# Patient Record
Sex: FEMALE | Race: BLACK OR AFRICAN AMERICAN | Employment: FULL TIME | URBAN - METROPOLITAN AREA
[De-identification: names, ages, dates, MRNs, and addresses within clinical notes are randomized per-mention and may not be internally consistent; named-entity substitution may affect disease eponyms.]

---

## 2017-05-17 ENCOUNTER — HOSPITAL ENCOUNTER (OUTPATIENT)
Dept: OTHER | Age: 52
Discharge: HOME | End: 2017-05-17
Attending: FAMILY MEDICINE | Admitting: FAMILY MEDICINE

## 2017-05-17 DIAGNOSIS — J20.9 ACUTE BRONCHITIS, UNSPECIFIED ORGANISM: ICD-10-CM

## 2017-05-26 ENCOUNTER — HOSPITAL ENCOUNTER (OUTPATIENT)
Dept: NON INVASIVE DIAGNOSTICS | Age: 52
Discharge: HOME | End: 2017-05-26
Attending: FAMILY MEDICINE | Admitting: FAMILY MEDICINE

## 2017-05-26 DIAGNOSIS — I51.7 CARDIOMEGALY: ICD-10-CM

## 2017-05-26 LAB
LV EF: 33 %
LVEF MODALITY: NORMAL

## 2017-06-01 ENCOUNTER — OFFICE VISIT (OUTPATIENT)
Dept: CARDIOLOGY CLINIC | Age: 52
End: 2017-06-01

## 2017-06-01 VITALS
SYSTOLIC BLOOD PRESSURE: 130 MMHG | BODY MASS INDEX: 29.09 KG/M2 | DIASTOLIC BLOOD PRESSURE: 88 MMHG | WEIGHT: 185.75 LBS

## 2017-06-01 DIAGNOSIS — I10 ESSENTIAL HYPERTENSION: ICD-10-CM

## 2017-06-01 DIAGNOSIS — R06.02 SOBOE (SHORTNESS OF BREATH ON EXERTION): ICD-10-CM

## 2017-06-01 DIAGNOSIS — I42.9 CARDIOMYOPATHY (HCC): Primary | ICD-10-CM

## 2017-06-01 DIAGNOSIS — Z78.9 ALCOHOL INGESTION: ICD-10-CM

## 2017-06-01 DIAGNOSIS — Z72.0 TOBACCO ABUSE: ICD-10-CM

## 2017-06-01 PROBLEM — R06.09 DOE (DYSPNEA ON EXERTION): Status: ACTIVE | Noted: 2017-06-01

## 2017-06-01 PROCEDURE — 93000 ELECTROCARDIOGRAM COMPLETE: CPT | Performed by: INTERNAL MEDICINE

## 2017-06-01 PROCEDURE — 99204 OFFICE O/P NEW MOD 45 MIN: CPT | Performed by: INTERNAL MEDICINE

## 2017-06-01 RX ORDER — LOSARTAN POTASSIUM AND HYDROCHLOROTHIAZIDE 12.5; 5 MG/1; MG/1
1 TABLET ORAL DAILY
Qty: 30 TABLET | Refills: 11 | Status: SHIPPED | OUTPATIENT
Start: 2017-06-01 | End: 2017-06-01 | Stop reason: SDUPTHER

## 2017-06-01 RX ORDER — LOSARTAN POTASSIUM AND HYDROCHLOROTHIAZIDE 12.5; 5 MG/1; MG/1
1 TABLET ORAL DAILY
Qty: 30 TABLET | Refills: 11 | Status: SHIPPED | OUTPATIENT
Start: 2017-06-01 | End: 2017-07-11 | Stop reason: SDUPTHER

## 2017-06-01 RX ORDER — CETIRIZINE HYDROCHLORIDE 10 MG/1
10 TABLET ORAL DAILY
COMMUNITY

## 2017-06-01 RX ORDER — CARVEDILOL 3.12 MG/1
3.12 TABLET ORAL 2 TIMES DAILY WITH MEALS
COMMUNITY
End: 2017-08-10 | Stop reason: SDUPTHER

## 2017-06-12 ENCOUNTER — HOSPITAL ENCOUNTER (OUTPATIENT)
Dept: NON INVASIVE DIAGNOSTICS | Age: 52
Discharge: HOME | End: 2017-06-14
Admitting: INTERNAL MEDICINE

## 2017-06-12 DIAGNOSIS — I42.9 CARDIOMYOPATHY (HCC): ICD-10-CM

## 2017-06-12 LAB
LV EF: 38 %
LVEF MODALITY: NORMAL

## 2017-06-14 ENCOUNTER — TELEPHONE (OUTPATIENT)
Dept: CARDIOLOGY CLINIC | Age: 52
End: 2017-06-14

## 2017-06-22 ENCOUNTER — HOSPITAL ENCOUNTER (OUTPATIENT)
Dept: OTHER | Age: 52
Discharge: HOME | End: 2017-06-22
Attending: INTERNAL MEDICINE | Admitting: INTERNAL MEDICINE

## 2017-06-22 DIAGNOSIS — R06.02 SOBOE (SHORTNESS OF BREATH ON EXERTION): ICD-10-CM

## 2017-06-22 DIAGNOSIS — I10 ESSENTIAL HYPERTENSION: ICD-10-CM

## 2017-06-22 DIAGNOSIS — I42.9 CARDIOMYOPATHY (HCC): ICD-10-CM

## 2017-06-22 LAB
ANION GAP SERPL CALCULATED.3IONS-SCNC: 13 MMOL/L (ref 3–16)
BASOPHILS ABSOLUTE: 0.1 K/UL (ref 0–0.2)
BASOPHILS RELATIVE PERCENT: 0.6 %
BUN BLDV-MCNC: 17 MG/DL (ref 7–20)
CALCIUM SERPL-MCNC: 9.7 MG/DL (ref 8.3–10.6)
CHLORIDE BLD-SCNC: 98 MMOL/L (ref 99–110)
CO2: 26 MMOL/L (ref 21–32)
CREAT SERPL-MCNC: 0.8 MG/DL (ref 0.6–1.1)
EOSINOPHILS ABSOLUTE: 0.1 K/UL (ref 0–0.6)
EOSINOPHILS RELATIVE PERCENT: 1 %
GFR AFRICAN AMERICAN: >60
GFR NON-AFRICAN AMERICAN: >60
GLUCOSE BLD-MCNC: 96 MG/DL (ref 70–99)
HCT VFR BLD CALC: 47.7 % (ref 36–48)
HEMOGLOBIN: 15 G/DL (ref 12–16)
LYMPHOCYTES ABSOLUTE: 3.7 K/UL (ref 1–5.1)
LYMPHOCYTES RELATIVE PERCENT: 37.2 %
MCH RBC QN AUTO: 29.5 PG (ref 26–34)
MCHC RBC AUTO-ENTMCNC: 31.4 G/DL (ref 31–36)
MCV RBC AUTO: 94 FL (ref 80–100)
MONOCYTES ABSOLUTE: 0.8 K/UL (ref 0–1.3)
MONOCYTES RELATIVE PERCENT: 8.6 %
NEUTROPHILS ABSOLUTE: 5.2 K/UL (ref 1.7–7.7)
NEUTROPHILS RELATIVE PERCENT: 52.6 %
PDW BLD-RTO: 13.9 % (ref 12.4–15.4)
PLATELET # BLD: 229 K/UL (ref 135–450)
PMV BLD AUTO: 11.6 FL (ref 5–10.5)
POTASSIUM SERPL-SCNC: 4.8 MMOL/L (ref 3.5–5.1)
PRO-BNP: 391 PG/ML (ref 0–124)
RBC # BLD: 5.07 M/UL (ref 4–5.2)
SODIUM BLD-SCNC: 137 MMOL/L (ref 136–145)
WBC # BLD: 9.9 K/UL (ref 4–11)

## 2017-06-27 ENCOUNTER — TELEPHONE (OUTPATIENT)
Dept: CARDIOLOGY CLINIC | Age: 52
End: 2017-06-27

## 2017-07-11 ENCOUNTER — OFFICE VISIT (OUTPATIENT)
Dept: CARDIOLOGY CLINIC | Age: 52
End: 2017-07-11

## 2017-07-11 VITALS
BODY MASS INDEX: 28.56 KG/M2 | DIASTOLIC BLOOD PRESSURE: 80 MMHG | WEIGHT: 182 LBS | OXYGEN SATURATION: 94 % | HEART RATE: 88 BPM | HEIGHT: 67 IN | SYSTOLIC BLOOD PRESSURE: 112 MMHG

## 2017-07-11 DIAGNOSIS — Z78.9 ALCOHOL INGESTION: ICD-10-CM

## 2017-07-11 DIAGNOSIS — I42.9 CARDIOMYOPATHY (HCC): ICD-10-CM

## 2017-07-11 DIAGNOSIS — Z72.0 TOBACCO ABUSE: Primary | ICD-10-CM

## 2017-07-11 DIAGNOSIS — R06.02 SOBOE (SHORTNESS OF BREATH ON EXERTION): ICD-10-CM

## 2017-07-11 DIAGNOSIS — I10 ESSENTIAL HYPERTENSION: ICD-10-CM

## 2017-07-11 PROCEDURE — 99214 OFFICE O/P EST MOD 30 MIN: CPT | Performed by: NURSE PRACTITIONER

## 2017-07-11 RX ORDER — LOSARTAN POTASSIUM AND HYDROCHLOROTHIAZIDE 12.5; 5 MG/1; MG/1
0.5 TABLET ORAL DAILY
Qty: 30 TABLET | Refills: 11 | Status: SHIPPED | OUTPATIENT
Start: 2017-07-11 | End: 2018-06-14 | Stop reason: DRUGHIGH

## 2017-08-10 ENCOUNTER — TELEPHONE (OUTPATIENT)
Dept: CARDIOLOGY CLINIC | Age: 52
End: 2017-08-10

## 2017-08-10 RX ORDER — CARVEDILOL 3.12 MG/1
3.12 TABLET ORAL 2 TIMES DAILY WITH MEALS
Qty: 180 TABLET | Refills: 3 | Status: SHIPPED | OUTPATIENT
Start: 2017-08-10 | End: 2018-06-14 | Stop reason: SDUPTHER

## 2017-10-09 ENCOUNTER — HOSPITAL ENCOUNTER (OUTPATIENT)
Dept: NON INVASIVE DIAGNOSTICS | Age: 52
Discharge: HOME | End: 2017-10-09
Attending: INTERNAL MEDICINE | Admitting: INTERNAL MEDICINE

## 2017-10-09 ENCOUNTER — OFFICE VISIT (OUTPATIENT)
Dept: CARDIOLOGY CLINIC | Age: 52
End: 2017-10-09

## 2017-10-09 VITALS
BODY MASS INDEX: 29.03 KG/M2 | HEART RATE: 82 BPM | HEIGHT: 67 IN | WEIGHT: 185 LBS | DIASTOLIC BLOOD PRESSURE: 70 MMHG | SYSTOLIC BLOOD PRESSURE: 110 MMHG

## 2017-10-09 DIAGNOSIS — I10 ESSENTIAL HYPERTENSION: ICD-10-CM

## 2017-10-09 DIAGNOSIS — R06.02 SOBOE (SHORTNESS OF BREATH ON EXERTION): Primary | ICD-10-CM

## 2017-10-09 DIAGNOSIS — I42.9 CARDIOMYOPATHY, UNSPECIFIED TYPE (HCC): ICD-10-CM

## 2017-10-09 DIAGNOSIS — I42.9 CARDIOMYOPATHY (HCC): ICD-10-CM

## 2017-10-09 DIAGNOSIS — Z78.9 ALCOHOL INGESTION: ICD-10-CM

## 2017-10-09 LAB
LEFT VENTRICULAR EJECTION FRACTION HIGH VALUE: 45 %
LEFT VENTRICULAR EJECTION FRACTION MODE: NORMAL
LV EF: 45 %
LVEF MODALITY: NORMAL

## 2017-10-09 PROCEDURE — 99214 OFFICE O/P EST MOD 30 MIN: CPT | Performed by: INTERNAL MEDICINE

## 2017-10-09 NOTE — PROGRESS NOTES
sores or sore throat. · Cardiovascular: Reviewed in HPI  · Respiratory: No cough or wheezing, no sputum production. No hematemesis. · Gastrointestinal: No abdominal pain, appetite loss, blood in stools. No change in bowel or bladder habits. · Genitourinary: No dysuria, trouble voiding, or hematuria. · Musculoskeletal:  No gait disturbance, weakness or joint complaints. · Integumentary: No rash or pruritis. · Neurological: No headache, diplopia, change in muscle strength, numbness or tingling. No change in gait, balance, coordination, mood, affect, memory, mentation, behavior. · Psychiatric: No anxiety, no depression. · Endocrine: No malaise, fatigue or temperature intolerance. No excessive thirst, fluid intake, or urination. No tremor. · Hematologic/Lymphatic: No abnormal bruising or bleeding, blood clots or swollen lymph nodes. · Allergic/Immunologic: No nasal congestion or hives. Physical Examination:      Constitutional and General Appearance:well developed well nourished female who appears stated age. Respiratory:  · Normal excursion and expansion without use of accessory muscles +SOBOE improving  · Resp Auscultation: Normal breath sounds without dullness  Cardiovascular:  · The apical impulse is not displaced  · Heart tones are crisp and normal  · Cervical veins are not engorged  · The carotid upstroke is normal in amplitude and contour without delay or bruit  · No M/G/R  · There is no clubbing, cyanosis of the extremities.   · No edema  · Femoral Arteries: 2+ and equal  · Pedal Pulses: 2+ and equal   Abdomen:  · No masses or tenderness  · Bowel sounds present  · No organomegaly appreciated  Neurological/Psychiatric:  · Alert and oriented in all spheres  · Moves all extremities well  · Exhibits normal gait balance and coordination  · No abnormalities of mood, affect, memory, mentation, or behavior are noted    2D Echo 10/9/2017   Left ventricular size is normal .   Normal left ventricular wall thickness.   Global ejection fraction is moderately decreased and estimated from 45 % .   Global hypokinesis noted.   Mild mitral regurgitation is present.   The left atrium is at the upper limits of normal in size.   There is mild tricuspid regurgitation with RVSP estimated at 29 mmHg. 2D Echo 5/26/2017   -Global ejection fraction is moderately decreased and estimated from 30 % to   35 % and 37% by 3D full volume.   -Global hypokinesis noted.   -Mildly dilated left atrium.   -Mild aortic regurgitation is present.   -Mild to moderate mitral regurgitation is present.   -There is moderate tricuspid regurgitation with RVSP estimated at 48 mmHg. EKG ST Sinus  Tachycardia interpreted and read by me  -Combined atrial enlargement.    -Nonspecific ST depression  -Nondiagnostic. R 102 bpm    Assessment:     1. Cardiomyopathy (HCCEF 30-35% by echo and 37% by 3D. RVSP 48 mmHg. will get a Stress Myoview to see if she has had a heart attack. 10/9/17 Repeat echo EF improved to 45%. 2. SOBOE (shortness of breath on exertion)--worsening over the past few months. 3. Hypertension--/70 (Site: Right Arm, Position: Sitting, Cuff Size: Large Adult)   Pulse 82   Ht 5' 7\" (1.702 m)   Wt 185 lb (83.9 kg)   BMI 28.98 kg/m²   Good control taking coreg 3.125 mg twice daily and  Hyzaar 50-12.5 mg daily for BP control. 4. Alcohol ingestion--drinks 1+ bottle at a time. Recommend she try and quit or try and cut back to occasional or celebratory use only. .   5. Tobacco abuse -- stop smoking strongly encouraged. Plan:  Continue all medications. This will help with improvement in LV function. Alcohol cessation extremely important and this was emphasized with patient. Follow up in six months. I appreciate the opportunity of cooperating in the care of this individual.    Ata [facility]NG[facility]

## 2018-01-12 ENCOUNTER — HOSPITAL ENCOUNTER (OUTPATIENT)
Dept: OTHER | Age: 53
Discharge: HOME | End: 2018-01-12
Attending: FAMILY MEDICINE | Admitting: FAMILY MEDICINE

## 2018-01-12 DIAGNOSIS — M79.672 LEFT FOOT PAIN: ICD-10-CM

## 2018-01-12 DIAGNOSIS — M25.512 LEFT SHOULDER PAIN, UNSPECIFIED CHRONICITY: ICD-10-CM

## 2018-03-20 ENCOUNTER — TELEPHONE (OUTPATIENT)
Dept: CARDIOLOGY CLINIC | Age: 53
End: 2018-03-20

## 2018-06-14 ENCOUNTER — OFFICE VISIT (OUTPATIENT)
Dept: CARDIOLOGY CLINIC | Age: 53
End: 2018-06-14

## 2018-06-14 VITALS
DIASTOLIC BLOOD PRESSURE: 90 MMHG | HEIGHT: 67 IN | WEIGHT: 188.8 LBS | HEART RATE: 64 BPM | BODY MASS INDEX: 29.63 KG/M2 | SYSTOLIC BLOOD PRESSURE: 148 MMHG

## 2018-06-14 DIAGNOSIS — I10 ESSENTIAL HYPERTENSION: ICD-10-CM

## 2018-06-14 DIAGNOSIS — R06.02 SOBOE (SHORTNESS OF BREATH ON EXERTION): ICD-10-CM

## 2018-06-14 DIAGNOSIS — I42.9 CARDIOMYOPATHY, UNSPECIFIED TYPE (HCC): Primary | ICD-10-CM

## 2018-06-14 PROCEDURE — 99214 OFFICE O/P EST MOD 30 MIN: CPT | Performed by: INTERNAL MEDICINE

## 2018-06-14 RX ORDER — LOSARTAN POTASSIUM AND HYDROCHLOROTHIAZIDE 12.5; 1 MG/1; MG/1
1 TABLET ORAL DAILY
Qty: 90 TABLET | Refills: 3 | Status: SHIPPED | OUTPATIENT
Start: 2018-06-14 | End: 2019-02-25 | Stop reason: CLARIF

## 2018-06-14 RX ORDER — ALLOPURINOL 300 MG/1
300 TABLET ORAL DAILY
COMMUNITY
End: 2018-08-10 | Stop reason: SDUPTHER

## 2018-06-14 RX ORDER — CARVEDILOL 3.12 MG/1
3.12 TABLET ORAL 2 TIMES DAILY WITH MEALS
Qty: 180 TABLET | Refills: 3 | Status: SHIPPED | OUTPATIENT
Start: 2018-06-14 | End: 2018-12-14 | Stop reason: SDUPTHER

## 2018-06-14 RX ORDER — LOSARTAN POTASSIUM AND HYDROCHLOROTHIAZIDE 12.5; 5 MG/1; MG/1
1 TABLET ORAL DAILY
Qty: 90 TABLET | Refills: 3 | Status: CANCELLED | OUTPATIENT
Start: 2018-06-14

## 2018-06-20 ENCOUNTER — TELEPHONE (OUTPATIENT)
Dept: CARDIOLOGY CLINIC | Age: 53
End: 2018-06-20

## 2018-06-21 ENCOUNTER — TELEPHONE (OUTPATIENT)
Dept: CARDIOLOGY CLINIC | Age: 53
End: 2018-06-21

## 2018-07-26 VITALS
RESPIRATION RATE: 16 BRPM | TEMPERATURE: 97.7 F | BODY MASS INDEX: 34.23 KG/M2 | WEIGHT: 186 LBS | HEART RATE: 98 BPM | HEIGHT: 62 IN | DIASTOLIC BLOOD PRESSURE: 76 MMHG | SYSTOLIC BLOOD PRESSURE: 124 MMHG

## 2018-08-10 ENCOUNTER — OFFICE VISIT (OUTPATIENT)
Dept: PRIMARY CARE CLINIC | Age: 53
End: 2018-08-10

## 2018-08-10 VITALS
WEIGHT: 190 LBS | SYSTOLIC BLOOD PRESSURE: 116 MMHG | BODY MASS INDEX: 29.82 KG/M2 | HEART RATE: 80 BPM | RESPIRATION RATE: 16 BRPM | DIASTOLIC BLOOD PRESSURE: 68 MMHG | HEIGHT: 67 IN

## 2018-08-10 DIAGNOSIS — M1A.0720 CHRONIC GOUT OF LEFT FOOT, UNSPECIFIED CAUSE: ICD-10-CM

## 2018-08-10 DIAGNOSIS — H61.23 BILATERAL IMPACTED CERUMEN: Primary | ICD-10-CM

## 2018-08-10 PROCEDURE — 69209 REMOVE IMPACTED EAR WAX UNI: CPT | Performed by: NURSE PRACTITIONER

## 2018-08-10 PROCEDURE — 99213 OFFICE O/P EST LOW 20 MIN: CPT | Performed by: NURSE PRACTITIONER

## 2018-08-10 RX ORDER — ALLOPURINOL 300 MG/1
300 TABLET ORAL DAILY
Qty: 30 TABLET | Refills: 1 | Status: SHIPPED | OUTPATIENT
Start: 2018-08-10 | End: 2019-12-30 | Stop reason: SDUPTHER

## 2018-08-10 ASSESSMENT — ENCOUNTER SYMPTOMS
SORE THROAT: 0
SINUS PRESSURE: 0
SINUS PAIN: 0
TROUBLE SWALLOWING: 0

## 2018-08-10 NOTE — PROGRESS NOTES
01/01/1980    Cervical cancer screen  01/01/1988    Lipid screen  01/01/2007    Breast cancer screen  01/01/2017    Shingles Vaccine (1 of 2 - 2 Dose Series) 01/01/2017    Colon cancer screen colonoscopy  01/01/2017    Potassium monitoring  06/22/2018    Creatinine monitoring  06/22/2018    Flu vaccine (1) 09/01/2018    DTaP/Tdap/Td vaccine (2 - Td) 02/24/2025    Pneumococcal med risk  Completed      Social History     Social History    Marital status:      Spouse name: N/A    Number of children: N/A    Years of education: N/A     Social History Main Topics    Smoking status: Current Every Day Smoker     Packs/day: 0.20    Smokeless tobacco: Never Used    Alcohol use Yes      Comment: OCC    Drug use: No    Sexual activity: Not Asked     Other Topics Concern    None     Social History Narrative    None     Family History   Problem Relation Age of Onset    Diabetes Other     High Blood Pressure Other     Other Other         CVA    Heart Disease Neg Hx      Patient Active Problem List   Diagnosis    Cardiomyopathy ([address]    DELUNA (dyspnea on exertion)    Tobacco abuse    Alcohol ingestion    Chronic gout of left foot        LABS:  Hospital Outpatient Visit on 10/09/2017   Component Date Value Ref Range Status    LEFT VENTRICULAR EJECTION FRACTION* 10/09/2017 Echo   Final    Left Ventricular Ejection Fraction* 10/09/2017 45  % Final          PHYSICAL EXAM  /68 (Site: Right Arm, Position: Sitting)   Pulse 80   Resp 16   Ht 5' 7\" (1.702 m)   Wt 190 lb (86.2 kg)   BMI 29.76 kg/m²     BP Readings from Last 3 Encounters:   08/10/18 116/68   03/12/18 124/76   06/14/18 (!) 148/90       Wt Readings from Last 3 Encounters:   08/10/18 190 lb (86.2 kg)   03/12/18 186 lb (84.4 kg)   06/14/18 188 lb 12.8 oz (85.6 kg)        Physical Exam   Constitutional: She is oriented to person, place, and time. She appears well-developed and well-nourished.    HENT:   Head: Normocephalic and atraumatic. Right Ear: Tympanic membrane normal.   Left Ear: Tympanic membrane, external ear and ear canal normal.   bilt cerumen impaction and after irrigated and small amt of right canal irritation   Eyes: Conjunctivae and EOM are normal. Pupils are equal, round, and reactive to light. Neck: Normal range of motion. Neck supple. Cardiovascular: Normal rate, regular rhythm and normal heart sounds. Pulmonary/Chest: Effort normal and breath sounds normal.   Musculoskeletal: Normal range of motion. Lymphadenopathy:     She has no cervical adenopathy. Neurological: She is alert and oriented to person, place, and time. Skin: Skin is warm and dry. Psychiatric: She has a normal mood and affect. Her behavior is normal.   Nursing note and vitals reviewed. ASSESSMENT/PLAN:  Alhaji San was seen today for cerumen impaction and Gout    Diagnoses and all orders for this visit:    Bilateral impacted cerumen  -     NE REMOVAL IMPACTED CERUMEN IRRIGATION/LVG UNILAT- Performed bilaterally  Discussed using Debrox 1 drop to each ear once a month to prevent impaction  Limit use of ear buds/phones and Qtips    Chronic gout of left foot, unspecified cause  -     allopurinol (ZYLOPRIM) 300 MG tablet; Take 1 tablet by mouth daily  -     Uric Acid; Future  Reinforced low purine foods        Return in about 3 months (around 11/10/2018) for GOUT.   Reviewed and/or ordered clinical lab results Yes  Reviewed and/or ordered radiology tests No  Reviewed and/or ordered other diagnostic tests No  Discussed test results with performing physician No  Independently reviewed image, tracing, or specimen No  Made a decision to obtain old records No  Reviewed and summarized old records Yes      Jesu Degroot was counseled regarding symptoms of current diagnosis, course and complications of disease if inadequately treated, side effects of medications, diagnosis, treatment options, and prognosis, risks, benefits, complications, and

## 2018-12-14 ENCOUNTER — HOSPITAL ENCOUNTER (OUTPATIENT)
Dept: NON INVASIVE DIAGNOSTICS | Age: 53
Discharge: HOME | End: 2018-12-14
Payer: COMMERCIAL

## 2018-12-14 ENCOUNTER — OFFICE VISIT (OUTPATIENT)
Dept: CARDIOLOGY CLINIC | Age: 53
End: 2018-12-14
Payer: COMMERCIAL

## 2018-12-14 VITALS
DIASTOLIC BLOOD PRESSURE: 80 MMHG | WEIGHT: 187.9 LBS | HEART RATE: 81 BPM | SYSTOLIC BLOOD PRESSURE: 118 MMHG | HEIGHT: 67 IN | BODY MASS INDEX: 29.49 KG/M2

## 2018-12-14 DIAGNOSIS — I42.9 CARDIOMYOPATHY, UNSPECIFIED TYPE (HCC): ICD-10-CM

## 2018-12-14 DIAGNOSIS — Z72.0 TOBACCO ABUSE: ICD-10-CM

## 2018-12-14 DIAGNOSIS — R06.02 SOBOE (SHORTNESS OF BREATH ON EXERTION): ICD-10-CM

## 2018-12-14 DIAGNOSIS — R06.09 DOE (DYSPNEA ON EXERTION): ICD-10-CM

## 2018-12-14 DIAGNOSIS — I10 ESSENTIAL HYPERTENSION: ICD-10-CM

## 2018-12-14 DIAGNOSIS — R00.2 PALPITATIONS: Primary | ICD-10-CM

## 2018-12-14 DIAGNOSIS — R07.9 CHEST PAIN IN ADULT: ICD-10-CM

## 2018-12-14 LAB
LEFT VENTRICULAR EJECTION FRACTION HIGH VALUE: 50 %
LEFT VENTRICULAR EJECTION FRACTION MODE: NORMAL
LV EF: 45 %
LV EF: 48 %
LVEF MODALITY: NORMAL

## 2018-12-14 PROCEDURE — 99214 OFFICE O/P EST MOD 30 MIN: CPT | Performed by: INTERNAL MEDICINE

## 2018-12-14 PROCEDURE — 93000 ELECTROCARDIOGRAM COMPLETE: CPT | Performed by: INTERNAL MEDICINE

## 2018-12-14 PROCEDURE — 93306 TTE W/DOPPLER COMPLETE: CPT

## 2018-12-14 RX ORDER — CARVEDILOL 3.12 MG/1
3.12 TABLET ORAL 2 TIMES DAILY WITH MEALS
Qty: 180 TABLET | Refills: 3 | Status: CANCELLED | OUTPATIENT
Start: 2018-12-14

## 2018-12-14 RX ORDER — CARVEDILOL 6.25 MG/1
6.25 TABLET ORAL 2 TIMES DAILY WITH MEALS
Qty: 180 TABLET | Refills: 3 | Status: SHIPPED | OUTPATIENT
Start: 2018-12-14 | End: 2019-06-25 | Stop reason: ALTCHOICE

## 2018-12-14 RX ORDER — LOSARTAN POTASSIUM AND HYDROCHLOROTHIAZIDE 25; 100 MG/1; MG/1
1 TABLET ORAL DAILY
COMMUNITY
End: 2019-07-26 | Stop reason: SDUPTHER

## 2018-12-14 RX ORDER — LOSARTAN POTASSIUM AND HYDROCHLOROTHIAZIDE 25; 100 MG/1; MG/1
1 TABLET ORAL DAILY
Qty: 90 TABLET | Refills: 3 | Status: CANCELLED | OUTPATIENT
Start: 2018-12-14

## 2018-12-14 NOTE — PROGRESS NOTES
is moderate tricuspid regurgitation with RVSP estimated at 48 mmHg. EKG 6/1/17> ST Sinus Tachycardia   -Combined atrial enlargement.    -Nonspecific ST depression  -Nondiagnostic. R 102 bpm    Assessment:     1. Cardiomyopathy ([address]): Stable, compensated. Increase Coreg to 6.25mg bid to see if this helps her palpitations. EKG today 12/14/18> NSR 82  ECHO 5/26/17> EF 30-35% and 37% by 3D. RVSP 48 mmHg. ECHO 10/9/17> EF improved to 45%. ECHO today 12/14/18> EF 45-50%. 2. SOB (shortness of breath on exertion), chronic: Stable, improved. 3. Hypertension: Stable. Blood pressure 118/80, pulse 81, height 5' 7\" (1.702 m), weight 187 lb 14.4 oz (85.2 kg). Increased Hyzaar to 100-12.5mg qd at last visit. 4. Alcohol ingestion: She currently does not quantify. 5. Tobacco dependence: Smoking cessation encouraged. Weaning off. Plan: Lyle Zee has a stable cardiac status but will monitor her heart rhythm and rate. 1. Holter monitor 24hr (schedule for Monday). 2. Double Coreg. Recommend Aleve for her chest and shoulder soreness. 3. Will continue with risk factor modifications. 4. Return for regular follow up in 6 months. I appreciate the opportunity of cooperating in the care of this individual.    [facility]g [facility]    Scribe's attestation: This note was scribed in the presence of Dr. Sreekanth Verma MD, by Bushra Betts RN. \  The scribe's documentation has been prepared under my direction and personally reviewed by me in its entirety. I confirm that the note above accurately reflects all work, treatment, procedures, and medical decision making performed by me.

## 2018-12-17 ENCOUNTER — NURSE ONLY (OUTPATIENT)
Dept: CARDIOLOGY CLINIC | Age: 53
End: 2018-12-17
Payer: COMMERCIAL

## 2018-12-17 DIAGNOSIS — R00.2 PALPITATIONS: Primary | ICD-10-CM

## 2018-12-17 PROCEDURE — 93224 XTRNL ECG REC UP TO 48 HRS: CPT | Performed by: INTERNAL MEDICINE

## 2018-12-28 ENCOUNTER — TELEPHONE (OUTPATIENT)
Dept: CARDIOLOGY CLINIC | Age: 53
End: 2018-12-28

## 2019-02-25 ENCOUNTER — OFFICE VISIT (OUTPATIENT)
Dept: PRIMARY CARE CLINIC | Age: 54
End: 2019-02-25
Payer: COMMERCIAL

## 2019-02-25 VITALS
HEART RATE: 88 BPM | RESPIRATION RATE: 16 BRPM | TEMPERATURE: 98.8 F | DIASTOLIC BLOOD PRESSURE: 70 MMHG | WEIGHT: 190 LBS | BODY MASS INDEX: 29.82 KG/M2 | HEIGHT: 67 IN | SYSTOLIC BLOOD PRESSURE: 106 MMHG

## 2019-02-25 DIAGNOSIS — E04.9 GOITER: ICD-10-CM

## 2019-02-25 DIAGNOSIS — D17.22 LIPOMA OF LEFT SHOULDER: ICD-10-CM

## 2019-02-25 DIAGNOSIS — B34.9 VIRAL SYNDROME: Primary | ICD-10-CM

## 2019-02-25 LAB
INFLUENZA A ANTIBODY: NORMAL
INFLUENZA B ANTIBODY: NORMAL

## 2019-02-25 PROCEDURE — 87804 INFLUENZA ASSAY W/OPTIC: CPT | Performed by: FAMILY MEDICINE

## 2019-02-25 PROCEDURE — 99213 OFFICE O/P EST LOW 20 MIN: CPT | Performed by: FAMILY MEDICINE

## 2019-02-25 RX ORDER — AMOXICILLIN 500 MG/1
500 CAPSULE ORAL 3 TIMES DAILY
Qty: 30 CAPSULE | Refills: 0 | Status: SHIPPED | OUTPATIENT
Start: 2019-02-25 | End: 2019-03-07

## 2019-02-25 ASSESSMENT — ENCOUNTER SYMPTOMS
PHOTOPHOBIA: 0
EYE ITCHING: 0
EYES NEGATIVE: 1
VOMITING: 0
SORE THROAT: 0
TROUBLE SWALLOWING: 0
CONSTIPATION: 0
ABDOMINAL PAIN: 0
RESPIRATORY NEGATIVE: 1
BACK PAIN: 0
COUGH: 0
ABDOMINAL DISTENTION: 0
GASTROINTESTINAL NEGATIVE: 1
DIARRHEA: 0
NAUSEA: 0
EYE DISCHARGE: 0
WHEEZING: 0
COLOR CHANGE: 0
EYE PAIN: 0
SHORTNESS OF BREATH: 0
CHEST TIGHTNESS: 0
SINUS PRESSURE: 0
APNEA: 0

## 2019-02-26 LAB
T4 FREE: 1.1 NG/DL (ref 0.9–1.8)
TSH SERPL DL<=0.05 MIU/L-ACNC: 0.88 UIU/ML (ref 0.27–4.2)

## 2019-03-11 ENCOUNTER — TELEPHONE (OUTPATIENT)
Dept: PRIMARY CARE CLINIC | Age: 54
End: 2019-03-11

## 2019-03-11 RX ORDER — FLUCONAZOLE 150 MG/1
150 TABLET ORAL ONCE
Qty: 1 TABLET | Refills: 0 | Status: SHIPPED | OUTPATIENT
Start: 2019-03-11 | End: 2019-03-11

## 2019-06-03 ENCOUNTER — TELEPHONE (OUTPATIENT)
Dept: CARDIOLOGY CLINIC | Age: 54
End: 2019-06-03

## 2019-06-03 NOTE — TELEPHONE ENCOUNTER
Pt is faxing [place] paperwork today to our office, she needs these completed and faxed back to the number on the paperwork prior to her appt with LES on 6-25. Pls call to advise. Thank you.

## 2019-06-07 ENCOUNTER — TELEPHONE (OUTPATIENT)
Dept: CARDIOLOGY CLINIC | Age: 54
End: 2019-06-07

## 2019-06-25 ENCOUNTER — OFFICE VISIT (OUTPATIENT)
Dept: CARDIOLOGY CLINIC | Age: 54
End: 2019-06-25
Payer: COMMERCIAL

## 2019-06-25 VITALS
SYSTOLIC BLOOD PRESSURE: 108 MMHG | HEART RATE: 91 BPM | DIASTOLIC BLOOD PRESSURE: 72 MMHG | BODY MASS INDEX: 30.89 KG/M2 | WEIGHT: 196.8 LBS | HEIGHT: 67 IN | OXYGEN SATURATION: 98 %

## 2019-06-25 DIAGNOSIS — R06.09 DOE (DYSPNEA ON EXERTION): ICD-10-CM

## 2019-06-25 DIAGNOSIS — Z72.0 TOBACCO ABUSE: ICD-10-CM

## 2019-06-25 DIAGNOSIS — I10 ESSENTIAL HYPERTENSION: ICD-10-CM

## 2019-06-25 DIAGNOSIS — I42.9 CARDIOMYOPATHY, UNSPECIFIED TYPE (HCC): Primary | ICD-10-CM

## 2019-06-25 PROCEDURE — 99214 OFFICE O/P EST MOD 30 MIN: CPT | Performed by: INTERNAL MEDICINE

## 2019-06-25 RX ORDER — METOPROLOL SUCCINATE 25 MG/1
25 TABLET, EXTENDED RELEASE ORAL DAILY
Qty: 90 TABLET | Refills: 2 | Status: SHIPPED | OUTPATIENT
Start: 2019-06-25 | End: 2020-01-15 | Stop reason: SDUPTHER

## 2019-06-25 NOTE — PROGRESS NOTES
Cardiac Follow up     Referring Provider:  Samson Severin, MD     Chief Complaint   Patient presents with    Cardiomyopathy    Palpitations      History of Present Illness: This 48 y.o. female. Her history includes cardiomyopathy. Initially, she was having worsening sob and cough over a few months. PMH includes smoking but is trying to quit. She has no other heart history. She had an echo 2017 and her EF was reduced to 30-35%, and 37% by 3D, then 35-45% by repeat 2D. She has a  history of hypertension was  tried on lisinopril; it caused a cough and was stopped. Today,she reports she feels well. Denies exertional chest pain, palpitations, light-headedness, edema. She walks two miles a day, and her shortness of breath has improved. She states she is still drinking, though not as much,  unfortunately she continues to smoke. States that while she is at work she does not smoke. Reports that she does not always take the second dose of her Coreg as she feels it makes her drowsy while she is working. Past Medical History:   has a past medical history of Allergic rhinitis, Cardiomyopathy ([address] Depression, Gout, and Hypertension. Surgical History:   has a past surgical history that includes Hysterectomy; cyst removal; Appendectomy; and partial hysterectomy (cervix not removed). Social History:   reports that she has been smoking. She has been smoking about 0.20 packs per day. She has never used smokeless tobacco. She reports that she drinks alcohol. She reports that she does not use drugs. Family History:  family history includes Diabetes in an other family member; High Blood Pressure in an other family member; Other in an other family member.      Home Medications:  Outpatient Medications Marked as Taking for the 6/25/19 encounter (Office Visit) with Lachelle Coleman MD   Medication Sig Dispense Refill    metoprolol succinate (TOPROL XL) 25 MG extended release tablet Take 1 tablet by mouth daily 90 tablet 2    losartan-hydrochlorothiazide (HYZAAR) 100-25 MG per tablet Take 1 tablet by mouth daily      carvedilol (COREG) 6.25 MG tablet Take 1 tablet by mouth 2 times daily (with meals) 180 tablet 3    allopurinol (ZYLOPRIM) 300 MG tablet Take 1 tablet by mouth daily 30 tablet 1    cetirizine (ZYRTEC) 10 MG tablet Take 10 mg by mouth daily      fluticasone (VERAMYST) 27.5 MCG/SPRAY nasal spray 2 sprays by Nasal route daily       Allergies:  Codeine and Lisinopril     Review of Systems:   · Constitutional: there has been no unanticipated weight loss. There's been no change in energy level, sleep pattern, or activity level. · Eyes: No visual changes or diplopia. No scleral icterus. · ENT: No Headaches, hearing loss or vertigo. No mouth sores or sore throat. · Cardiovascular: Reviewed in HPI  · Respiratory: No cough or wheezing, no sputum production. No hematemesis. · Gastrointestinal: No abdominal pain, appetite loss, blood in stools. No change in bowel or bladder habits. · Genitourinary: No dysuria, trouble voiding, or hematuria. · Musculoskeletal:  No gait disturbance, weakness or joint complaints. · Integumentary: No rash or pruritis. · Neurological: No headache, diplopia, change in muscle strength, numbness or tingling. No change in gait, balance, coordination, mood, affect, memory, mentation, behavior. · Psychiatric: No anxiety, no depression. · Endocrine: No malaise, fatigue or temperature intolerance. No excessive thirst, fluid intake, or urination. No tremor. · Hematologic/Lymphatic: No abnormal bruising or bleeding, blood clots or swollen lymph nodes. · Allergic/Immunologic: No nasal congestion or hives. Physical Examination:    Blood pressure 108/72, pulse 91, height 5' 7\" (1.702 m), weight 196 lb 12.8 oz (89.3 kg), SpO2 98 %, not currently breastfeeding. Constitutional and General Appearance:well developed well nourished female who appears stated age.   Skin:good turgor,intact without lesions  HEENT: EOMI ,normal  Neck:no JVD      Respiratory:  · Normal excursion and expansion without use of accessory muscles +SOBOE improving  · Resp Auscultation: Normal breath sounds without dullness  Cardiovascular:  · The apical impulse is not displaced  · Heart tones are crisp and normal  · Cervical veins are not engorged  · The carotid upstroke is normal in amplitude and contour without delay or bruit  · No M/G/R  · There is no clubbing, cyanosis of the extremities. · No edema  · Femoral Arteries: 2+ and equal  · Pedal Pulses: 2+ and equal   Abdomen:  · No masses or tenderness  · Bowel sounds present  · No organomegaly appreciated  Neurological/Psychiatric:  · Alert and oriented in all spheres  · Moves all extremities well  · Exhibits normal gait balance and coordination  · No abnormalities of mood, affect, memory, mentation, or behavior are noted    2D ECHO 12/14/18:  -Left ventricular size is at upper limits of normal .   -Normal left ventricular wall thickness.   -Global ejection fraction is mildly decreased and estimated from 45-50% .  -Global hypokinesis noted. E/e'= 7.85   -There is reversal of E/A inflow velocities across the mitral valve.   -Mild mitral regurgitation is present.   -There is mild tricuspid regurgitation with RVSP estimated at 24 mmHg. Assessment:     1. Cardiomyopathy ([address]): Stable, compensated. ECHO 5/26/17> EF 30-35% and 37% by 3D. RVSP 48 mmHg. ECHO 10/9/17> EF improved to 45%. ECHO  12/14/18> EF 45-50%. -repeat echo in 6 months to evaluate for changes    2. SOB (shortness of breath on exertion), chronic: Stable, improved. 3. Hypertension: Stable. Blood pressure 108/72, pulse 91, height 5' 7\" (1.702 m), weight 196 lb 12.8 oz (89.3 kg), SpO2 98 %, not currently breastfeeding. 4. Tobacco dependence: Smoking cessation encouraged. Plan: Elvira David has a stable cardiac status. Lab results reviewed this visit and discussed. Stop Coreg, feels that she is to drowsy with the Pm dose. Start Toprol XL 25 mg daily   Continue risk factor modifications. Call for any change in symptoms. Return for regular follow up in 6 months with Echo. I appreciate the opportunity of cooperating in the care of this individual.    Carolyne [facility]    Patient's problem list, medications, allergies, past medical, surgical, social and family histories were reviewed and updated as appropriate. Scribe's attestation: This note was scribed in the presence of Dr. Isacc Perera MD, by Geo Ceja RN. The scribe's documentation has been prepared under my direction and personally reviewed by me in its entirety. I confirm that the note above accurately reflects all work, treatment, procedures, and medical decision making performed by me.

## 2019-07-26 DIAGNOSIS — I10 ESSENTIAL HYPERTENSION: Primary | ICD-10-CM

## 2019-07-29 RX ORDER — LOSARTAN POTASSIUM AND HYDROCHLOROTHIAZIDE 25; 100 MG/1; MG/1
TABLET ORAL
Qty: 90 TABLET | Refills: 0 | Status: SHIPPED | OUTPATIENT
Start: 2019-07-29 | End: 2020-05-13

## 2019-12-27 DIAGNOSIS — M1A.0720 CHRONIC GOUT OF LEFT FOOT, UNSPECIFIED CAUSE: ICD-10-CM

## 2019-12-30 RX ORDER — ALLOPURINOL 300 MG/1
300 TABLET ORAL DAILY
Qty: 30 TABLET | Refills: 1 | Status: SHIPPED | OUTPATIENT
Start: 2019-12-30 | End: 2021-01-18 | Stop reason: ALTCHOICE

## 2020-01-09 NOTE — PROGRESS NOTES
bruit  · No M/G/R  · There is no clubbing, cyanosis of the extremities. · No edema  · Femoral Arteries: 2+ and equal  · Pedal Pulses: 2+ and equal   Abdomen:  · No masses or tenderness  · Bowel sounds present  · No organomegaly appreciated  Neurological/Psychiatric:  · Alert and oriented in all spheres  · Moves all extremities well  · Exhibits normal gait balance and coordination  · No abnormalities of mood, affect, memory, mentation, or behavior are noted    2D ECHO 12/14/18:  -Left ventricular size is at upper limits of normal .   -Normal left ventricular wall thickness.   -Global ejection fraction is mildly decreased and estimated from 45-50% .  -Global hypokinesis noted. E/e'= 7.85   -There is reversal of E/A inflow velocities across the mitral valve.   -Mild mitral regurgitation is present.   -There is mild tricuspid regurgitation with RVSP estimated at 24 mmHg. Assessment:     1. Cardiomyopathy ([address]): Stable, compensated. ECHO 5/26/17> EF 30-35% and 37% by 3D. RVSP 48 mmHg. ECHO 10/9/17> EF improved to 45%. ECHO  12/14/18> EF 45-50%. ECHO 1/15/20> will be scheduled   2. SOB (shortness of breath on exertion), chronic: Stable, improved. 3. Hypertension: Stable. Blood pressure 124/80, pulse 80, height 5' 7\" (1.702 m), weight 193 lb 1.9 oz (87.6 kg), not currently breastfeeding. 4. Tobacco dependence: Smoking cessation encouraged. Plan: Chivo Dunham has a stable cardiac status. Cardiac test and lab results personally reviewed by me during this office visit and discussed. Increase metoprolol succinate (TOPROL XL) 50 MG daily. Schedule echo in 2-3 months. Continue risk factor modifications. Call for any change in symptoms. Return for regular follow up in 6 months. Once again advised her that alcohol cessation gives best chance of full recovery of cardiomyopathy        I appreciate the opportunity of cooperating in the care of this individual.    Dillon Du.

## 2020-01-15 ENCOUNTER — OFFICE VISIT (OUTPATIENT)
Dept: CARDIOLOGY CLINIC | Age: 55
End: 2020-01-15
Payer: COMMERCIAL

## 2020-01-15 VITALS
WEIGHT: 193.12 LBS | BODY MASS INDEX: 30.31 KG/M2 | HEIGHT: 67 IN | DIASTOLIC BLOOD PRESSURE: 80 MMHG | SYSTOLIC BLOOD PRESSURE: 124 MMHG | HEART RATE: 80 BPM

## 2020-01-15 PROCEDURE — 99214 OFFICE O/P EST MOD 30 MIN: CPT | Performed by: INTERNAL MEDICINE

## 2020-01-15 RX ORDER — METOPROLOL SUCCINATE 50 MG/1
50 TABLET, EXTENDED RELEASE ORAL DAILY
Qty: 90 TABLET | Refills: 1 | Status: SHIPPED | OUTPATIENT
Start: 2020-01-15 | End: 2020-07-08 | Stop reason: ALTCHOICE

## 2020-03-06 ENCOUNTER — TELEPHONE (OUTPATIENT)
Dept: CARDIOLOGY CLINIC | Age: 55
End: 2020-03-06

## 2020-03-06 ENCOUNTER — HOSPITAL ENCOUNTER (OUTPATIENT)
Dept: NON INVASIVE DIAGNOSTICS | Age: 55
Discharge: HOME | End: 2020-03-06
Payer: COMMERCIAL

## 2020-03-06 LAB
LV EF: 55 %
LVEF MODALITY: NORMAL

## 2020-03-06 PROCEDURE — 93306 TTE W/DOPPLER COMPLETE: CPT

## 2020-03-16 ENCOUNTER — TELEPHONE (OUTPATIENT)
Dept: CARDIOLOGY CLINIC | Age: 55
End: 2020-03-16

## 2020-05-13 ENCOUNTER — TELEPHONE (OUTPATIENT)
Dept: CARDIOLOGY CLINIC | Age: 55
End: 2020-05-13

## 2020-05-13 RX ORDER — LOSARTAN POTASSIUM AND HYDROCHLOROTHIAZIDE 25; 100 MG/1; MG/1
TABLET ORAL
Qty: 90 TABLET | Refills: 1 | Status: SHIPPED | OUTPATIENT
Start: 2020-05-13 | End: 2020-05-19 | Stop reason: SDUPTHER

## 2020-05-19 ENCOUNTER — TELEPHONE (OUTPATIENT)
Dept: CARDIOLOGY CLINIC | Age: 55
End: 2020-05-19

## 2020-05-19 RX ORDER — LOSARTAN POTASSIUM AND HYDROCHLOROTHIAZIDE 25; 100 MG/1; MG/1
1 TABLET ORAL DAILY
Qty: 90 TABLET | Refills: 1 | Status: SHIPPED | OUTPATIENT
Start: 2020-05-19 | End: 2021-01-18 | Stop reason: SDUPTHER

## 2020-07-08 ENCOUNTER — OFFICE VISIT (OUTPATIENT)
Dept: CARDIOLOGY CLINIC | Age: 55
End: 2020-07-08
Payer: COMMERCIAL

## 2020-07-08 VITALS
WEIGHT: 198.12 LBS | HEIGHT: 67 IN | HEART RATE: 64 BPM | BODY MASS INDEX: 31.1 KG/M2 | SYSTOLIC BLOOD PRESSURE: 110 MMHG | DIASTOLIC BLOOD PRESSURE: 64 MMHG

## 2020-07-08 PROCEDURE — 99214 OFFICE O/P EST MOD 30 MIN: CPT | Performed by: INTERNAL MEDICINE

## 2020-07-08 NOTE — PATIENT INSTRUCTIONS
Metoprolol succinate (TOPROL XL) 50 MG extended release tablet -Take every other-day for 2 weeks - then stop. Guille Ibanez MD     Address:  [place], [address]     Phone: [phone]

## 2020-07-08 NOTE — PROGRESS NOTES
Allergies:  Codeine and Lisinopril     Review of Systems:   · Constitutional: there has been no unanticipated weight loss. There's been no change in energy level, sleep pattern, or activity level. · Eyes: No visual changes or diplopia. No scleral icterus. · ENT: No Headaches, hearing loss or vertigo. No mouth sores or sore throat. · Cardiovascular: Reviewed in HPI  · Respiratory: No cough or wheezing, no sputum production. No hematemesis. · Gastrointestinal: No abdominal pain, appetite loss, blood in stools. No change in bowel or bladder habits. · Genitourinary: No dysuria, trouble voiding, or hematuria. · Musculoskeletal:  No gait disturbance, weakness or joint complaints. · Integumentary: No rash or pruritis. · Neurological: No headache, diplopia, change in muscle strength, numbness or tingling. No change in gait, balance, coordination, mood, affect, memory, mentation, behavior. · Psychiatric: No anxiety, no depression. · Endocrine: No malaise, fatigue or temperature intolerance. No excessive thirst, fluid intake, or urination. No tremor. · Hematologic/Lymphatic: No abnormal bruising or bleeding, blood clots or swollen lymph nodes. · Allergic/Immunologic: No nasal congestion or hives. Physical Examination:    Blood pressure 110/64, pulse 64, height 5' 7\" (1.702 m), weight 198 lb 1.9 oz (89.9 kg), not currently breastfeeding. Constitutional and General Appearance:well developed well nourished female who appears stated age.   Skin:good turgor,intact without lesions  HEENT: EOMI ,normal  Neck:no JVD      Respiratory:  · Normal excursion and expansion without use of accessory muscles +SOBOE improving  · Resp Auscultation: Normal breath sounds without dullness  Cardiovascular:  · The apical impulse is not displaced  · Heart tones are crisp and normal  · Cervical veins are not engorged  · The carotid upstroke is normal in amplitude and contour without delay or bruit  · No M/G/R  · There is no clubbing, cyanosis of the extremities. · No edema  · Femoral Arteries: 2+ and equal  · Pedal Pulses: 2+ and equal   Abdomen:  · No masses or tenderness  · Bowel sounds present  · No organomegaly appreciated  Neurological/Psychiatric:  · Alert and oriented in all spheres  · Moves all extremities well  · Exhibits normal gait balance and coordination  · No abnormalities of mood, affect, memory, mentation, or behavior are noted    2D ECHO 12/14/18:  -Left ventricular size is at upper limits of normal .   -Normal left ventricular wall thickness.   -Global ejection fraction is mildly decreased and estimated from 45-50% .  -Global hypokinesis noted. E/e'= 7.85   -There is reversal of E/A inflow velocities across the mitral valve.   -Mild mitral regurgitation is present.   -There is mild tricuspid regurgitation with RVSP estimated at 24 mmHg. Assessment:     1. Cardiomyopathy ([address]): Stable, compensated. ECHO 5/26/17> EF 30-35% and 37% by 3D. RVSP 48 mmHg. ECHO 10/9/17> EF improved to 45%. ECHO  12/14/18> EF 45-50%. ECHO 3/06/20> EF 55%      2. SOB (shortness of breath on exertion), chronic: Stable, improved. 3. Hypertension: Stable. Blood pressure 110/64, pulse 64, height 5' 7\" (1.702 m), weight 198 lb 1.9 oz (89.9 kg), not currently breastfeeding. 4. Tobacco dependence: Smoking cessation encouraged. Plan: Marquis East has a stable cardiac status. Cardiac test and lab results personally reviewed by me during this office visit and discussed. Metoprolol succinate (TOPROL XL) 50 MG extended release tablet -Take every other-day for 2 weeks - then stop. Continue risk factor modifications. Call for any change in symptoms. Return for regular follow up in 6 months. I appreciate the opportunity of cooperating in the care of this individual.    Daija [facility]    Patient's problem list, medications, allergies, past medical, surgical, social and family histories were reviewed and updated as appropriate. Scribe's attestation: This note was scribed in the presence of Dr. aKlyani Walker MD, by Dona Cerda RN. The scribe's documentation has been prepared under my direction and personally reviewed by me in its entirety. I confirm that the note above accurately reflects all work, treatment, procedures, and medical decision making performed by me.

## 2020-07-09 ENCOUNTER — TELEPHONE (OUTPATIENT)
Dept: INTERNAL MEDICINE CLINIC | Age: 55
End: 2020-07-09

## 2020-07-09 NOTE — TELEPHONE ENCOUNTER
The following patient is requesting a new patient appointment    With Provider: Dr Peg Mercer (referred by cardiologist)    Insurance: [employer]    Medications / Conditions / Complaint :  Looking to establish a PCP, High Blood pressure, Seeing a cardiologist    Preferred Days: na    Preferred Time: na    Best Contact Number: [phone]

## 2020-07-10 NOTE — TELEPHONE ENCOUNTER
Dr. Blanca Crane doesn't accept transfer/new to provider patients. She has last seen Dr. Kenyetta Torres last year.

## 2020-07-30 ENCOUNTER — OFFICE VISIT (OUTPATIENT)
Dept: INTERNAL MEDICINE CLINIC | Age: 55
End: 2020-07-30
Payer: COMMERCIAL

## 2020-07-30 VITALS — DIASTOLIC BLOOD PRESSURE: 80 MMHG | BODY MASS INDEX: 30.23 KG/M2 | WEIGHT: 193 LBS | SYSTOLIC BLOOD PRESSURE: 118 MMHG

## 2020-07-30 PROBLEM — F41.3 OTHER MIXED ANXIETY DISORDERS: Status: ACTIVE | Noted: 2020-07-30

## 2020-07-30 LAB — VITAMIN D 25-HYDROXY: 10.2 NG/ML

## 2020-07-30 PROCEDURE — 99203 OFFICE O/P NEW LOW 30 MIN: CPT | Performed by: NURSE PRACTITIONER

## 2020-07-30 SDOH — HEALTH STABILITY: PHYSICAL HEALTH: ON AVERAGE, HOW MANY MINUTES DO YOU ENGAGE IN EXERCISE AT THIS LEVEL?: 60 MIN

## 2020-07-30 SDOH — SOCIAL STABILITY: SOCIAL NETWORK: HOW OFTEN DO YOU GET TOGETHER WITH FRIENDS OR RELATIVES?: TWICE A WEEK

## 2020-07-30 SDOH — SOCIAL STABILITY: SOCIAL NETWORK: HOW OFTEN DO YOU ATTEND CHURCH OR RELIGIOUS SERVICES?: MORE THAN 4 TIMES PER YEAR

## 2020-07-30 SDOH — HEALTH STABILITY: PHYSICAL HEALTH: ON AVERAGE, HOW MANY DAYS PER WEEK DO YOU ENGAGE IN MODERATE TO STRENUOUS EXERCISE (LIKE A BRISK WALK)?: 3 DAYS

## 2020-07-30 SDOH — HEALTH STABILITY: MENTAL HEALTH
STRESS IS WHEN SOMEONE FEELS TENSE, NERVOUS, ANXIOUS, OR CAN'T SLEEP AT NIGHT BECAUSE THEIR MIND IS TROUBLED. HOW STRESSED ARE YOU?: VERY MUCH

## 2020-07-30 SDOH — SOCIAL STABILITY: SOCIAL NETWORK
IN A TYPICAL WEEK, HOW MANY TIMES DO YOU TALK ON THE PHONE WITH FAMILY, FRIENDS, OR NEIGHBORS?: MORE THAN THREE TIMES A WEEK

## 2020-07-30 SDOH — SOCIAL STABILITY: SOCIAL NETWORK: ARE YOU MARRIED, WIDOWED, DIVORCED, SEPARATED, NEVER MARRIED, OR LIVING WITH A PARTNER?: NEVER MARRIED

## 2020-07-30 SDOH — HEALTH STABILITY: MENTAL HEALTH: HOW MANY STANDARD DRINKS CONTAINING ALCOHOL DO YOU HAVE ON A TYPICAL DAY?: 3 OR 4

## 2020-07-30 ASSESSMENT — PATIENT HEALTH QUESTIONNAIRE - PHQ9
SUM OF ALL RESPONSES TO PHQ9 QUESTIONS 1 & 2: 0
SUM OF ALL RESPONSES TO PHQ QUESTIONS 1-9: 0
1. LITTLE INTEREST OR PLEASURE IN DOING THINGS: 0
SUM OF ALL RESPONSES TO PHQ QUESTIONS 1-9: 0
2. FEELING DOWN, DEPRESSED OR HOPELESS: 0

## 2020-07-30 NOTE — PATIENT INSTRUCTIONS
Continue to exercise  Avoid fast food if possible  Do stress exercises once a day  Patient Education        Learning About Mindfulness for Stress  What are mindfulness and stress? Stress is what you feel when you have to handle more than you are used to. A lot of things can cause stress. You may feel stress when you go on a job interview, take a test, or run a race. This kind of short-term stress is normal and even useful. It can help you if you need to work hard or react quickly. Stress also can last a long time. Long-term stress is caused by stressful situations or events. Examples of long-term stress include long-term health problems, ongoing problems at work, and conflicts in your family. Long-term stress can harm your health. Mindfulness is a focus only on things happening in the present moment. It's a process of purposefully paying attention to and being aware of your surroundings, your emotions, your thoughts, and how your body feels. You are aware of these things, but you aren't judging these experiences as \"good\" or \"bad. \" Mindfulness can help you learn to calm your mind and body to help you cope with illness, pain, and stress. How does mindfulness help to relieve stress? Mindfulness can help quiet your mind and relax your body. Studies show that it can help some people sleep better, feel less anxious, and bring their blood pressure down. And it's been shown to help some people live and cope better with certain health problems like heart disease, depression, chronic pain, and cancer. How do you practice mindfulness? To be mindful is to pay attention, to be present, and to be accepting. · When you're mindful, you do just one thing and you pay close attention to that one thing. For example, you may sit quietly and notice your emotions or how your food tastes and smells. · When you're present, you focus on the things that are happening right now.  You let go of your thoughts about the past and the 12.5  © 9946-8441 Healthwise, Incorporated. Care instructions adapted under license by [facility] ([facility]). If you have questions about a medical condition or this instruction, always ask your healthcare professional. [address] any warranty or liability for your use of this information.

## 2020-07-30 NOTE — PROGRESS NOTES
take a test, or run a race. This kind of short-term stress is normal and even useful. It can help you if you need to work hard or react quickly. Stress also can last a long time. Long-term stress is caused by stressful situations or events. Examples of long-term stress include long-term health problems, ongoing problems at work, and conflicts in your family. Long-term stress can harm your health. Mindfulness is a focus only on things happening in the present moment. It's a process of purposefully paying attention to and being aware of your surroundings, your emotions, your thoughts, and how your body feels. You are aware of these things, but you aren't judging these experiences as \"good\" or \"bad. \" Mindfulness can help you learn to calm your mind and body to help you cope with illness, pain, and stress. How does mindfulness help to relieve stress? Mindfulness can help quiet your mind and relax your body. Studies show that it can help some people sleep better, feel less anxious, and bring their blood pressure down. And it's been shown to help some people live and cope better with certain health problems like heart disease, depression, chronic pain, and cancer. How do you practice mindfulness? To be mindful is to pay attention, to be present, and to be accepting. · When you're mindful, you do just one thing and you pay close attention to that one thing. For example, you may sit quietly and notice your emotions or how your food tastes and smells. · When you're present, you focus on the things that are happening right now. You let go of your thoughts about the past and the future. When you dwell on the past or the future, you miss moments that can heal and strengthen you. You may miss moments like hearing a child laugh or seeing a friendly face when you think you're all alone. · When you're accepting, you don't  the present moment. Instead you accept your thoughts and feelings as they come.   You can practice anytime, anywhere, and in any way you choose. You can practice in many ways. Here are a few ideas:  · While doing your chores, like washing the dishes, let your mind focus on what's in your hand. What does the dish feel like? Is the water warm or cold? · Go outside and take a few deep breaths. What is the air like? Is it warm or cold? · When you can, take some time at the start of your day to sit alone and think. · Take a slow walk by yourself. Count your steps while you breathe in and out. · Try yoga breathing exercises, stretches, and poses to strengthen and relax your muscles. · At work, if you can, try to stop for a few moments each hour. Note how your body feels. Let yourself regroup and let your mind settle before you return to what you were doing. · If you struggle with anxiety or \"worry thoughts,\" imagine your mind as a blue vj and your worry thoughts as clouds. Now imagine those worry thoughts floating across your mind's vj. Just let them pass by as you watch. Follow-up care is a key part of your treatment and safety. Be sure to make and go to all appointments, and call your doctor if you are having problems. It's also a good idea to know your test results and keep a list of the medicines you take. Where can you learn more? Go to [email or web address] and sign in to your [employer] account. Enter N545 in the Pixalate box to learn more about \"Learning About Mindfulness for Stress. \"     If you do not have an account, please click on the \"Sign Up Now\" link. Current as of: December 16, 2019               Content Version: 12.5  © 4524-6451 Healthwise, Incorporated. Care instructions adapted under license by [facility] ([facility]). If you have questions about a medical condition or this instruction, always ask your healthcare professional. [address] any warranty or liability for your use of this information.         FERNANDO Messer - CNP

## 2020-07-31 LAB
ESTIMATED AVERAGE GLUCOSE: 134.1 MG/DL
HBA1C MFR BLD: 6.3 %

## 2021-01-05 NOTE — PROGRESS NOTES
Cardiac Follow up     Referring Provider:  FERNANDO Reina CNP     No chief complaint on file. f/up for cardiomyopathy  History of Present Illness: This 49 y.o. female. Her history includes cardiomyopathy. Initially, she was having worsening sob and cough over a few months. PMH includes smoking but is trying to quit. She has no other heart history. She had an echo 2017 and her EF was reduced to 30-35%, and 37% by 3D, then 35-45% by repeat 2D. She has a  history of hypertension was  tried on lisinopril; it caused a cough and was stopped. Today,she reports she feels ok. She has gained weight, feels like it has been difficult to lose despite diet change. She does not have a PCP and would like suggestions. She is compliant with her medications, denies side effects. Denies exertional chest pain, palpitations, light-headedness, edema. Past Medical History:   has a past medical history of Allergic rhinitis, Cardiomyopathy ([address] Depression, Gout, and Hypertension. Surgical History:   has a past surgical history that includes Hysterectomy; cyst removal; Appendectomy; and partial hysterectomy (cervix not removed). Social History:   reports that she has been smoking. She has a 6.20 pack-year smoking history. She has never used smokeless tobacco. She reports current alcohol use of about 2.0 standard drinks of alcohol per week. She reports that she does not use drugs. Family History:  family history includes Diabetes in her father, maternal grandfather, maternal grandmother, mother, and another family member; High Blood Pressure in an other family member; Hypertension in her maternal grandfather and maternal grandmother; Lupus in her sister; No Known Problems in her paternal grandfather and paternal grandmother; Other in an other family member; Rheum Arthritis in her mother and sister.      Home Medications: No outpatient medications have been marked as taking for the 1/18/21 encounter (Appointment) with Alize Adan MD.     Allergies:  Codeine and Lisinopril     Review of Systems:   · Constitutional: there has been no unanticipated weight loss. There's been no change in energy level, sleep pattern, or activity level. · Eyes: No visual changes or diplopia. No scleral icterus. · ENT: No Headaches, hearing loss or vertigo. No mouth sores or sore throat. · Cardiovascular: Reviewed in HPI  · Respiratory: No cough or wheezing, no sputum production. No hematemesis. · Gastrointestinal: No abdominal pain, appetite loss, blood in stools. No change in bowel or bladder habits. · Genitourinary: No dysuria, trouble voiding, or hematuria. · Musculoskeletal:  No gait disturbance, weakness or joint complaints. · Integumentary: No rash or pruritis. · Neurological: No headache, diplopia, change in muscle strength, numbness or tingling. No change in gait, balance, coordination, mood, affect, memory, mentation, behavior. · Psychiatric: No anxiety, no depression. · Endocrine: No malaise, fatigue or temperature intolerance. No excessive thirst, fluid intake, or urination. No tremor. · Hematologic/Lymphatic: No abnormal bruising or bleeding, blood clots or swollen lymph nodes. · Allergic/Immunologic: No nasal congestion or hives. Physical Examination:    not currently breastfeeding. Constitutional and General Appearance:well developed well nourished female who appears stated age.   Skin:good turgor,intact without lesions  HEENT: EOMI ,normal  Neck:no JVD      Respiratory:  · Normal excursion and expansion without use of accessory muscles +SOBOE improving  · Resp Auscultation: Normal breath sounds without dullness  Cardiovascular:  · The apical impulse is not displaced  · Heart tones are crisp and normal  · Cervical veins are not engorged · The carotid upstroke is normal in amplitude and contour without delay or bruit  · No M/G/R  · There is no clubbing, cyanosis of the extremities. · No edema  · Femoral Arteries: 2+ and equal  · Pedal Pulses: 2+ and equal   Abdomen:  · No masses or tenderness  · Bowel sounds present  · No organomegaly appreciated  Neurological/Psychiatric:  · Alert and oriented in all spheres  · Moves all extremities well  · Exhibits normal gait balance and coordination  · No abnormalities of mood, affect, memory, mentation, or behavior are noted    2D ECHO 12/14/18:  -Left ventricular size is at upper limits of normal .   -Normal left ventricular wall thickness.   -Global ejection fraction is mildly decreased and estimated from 45-50% .  -Global hypokinesis noted. E/e'= 7.85   -There is reversal of E/A inflow velocities across the mitral valve.   -Mild mitral regurgitation is present.   -There is mild tricuspid regurgitation with RVSP estimated at 24 mmHg. Assessment:     1. Cardiomyopathy ([address]): Stable, compensated. ECHO 5/26/17> EF 30-35% and 37% by 3D. RVSP 48 mmHg. ECHO 10/9/17> EF improved to 45%. ECHO  12/14/18> EF 45-50%. ECHO 3/06/20> EF 55%      2. SOB (shortness of breath on exertion), chronic: Stable, improved. 3. Hypertension: Stable. - TOPROL XL stopped at last visit. not currently breastfeeding. 4. Tobacco dependence: Smoking cessation encouraged. Plan: Lefty Reveles has a stable cardiac status. Cardiac test and lab results personally reviewed by me during this office visit and discussed. Metoprolol succinate (TOPROL XL) 50 MG extended release tablet -Take every other-day for 2 weeks - then stop. Continue risk factor modifications. Call for any change in symptoms. Return for regular follow up in 6 months. I appreciate the opportunity of cooperating in the care of this individual.    Linda LUO.[facility]

## 2021-01-18 ENCOUNTER — OFFICE VISIT (OUTPATIENT)
Dept: CARDIOLOGY CLINIC | Age: 56
End: 2021-01-18
Payer: COMMERCIAL

## 2021-01-18 VITALS
HEIGHT: 67 IN | OXYGEN SATURATION: 99 % | BODY MASS INDEX: 30.72 KG/M2 | HEART RATE: 91 BPM | WEIGHT: 195.7 LBS | DIASTOLIC BLOOD PRESSURE: 76 MMHG | SYSTOLIC BLOOD PRESSURE: 126 MMHG

## 2021-01-18 DIAGNOSIS — Z72.0 TOBACCO ABUSE: ICD-10-CM

## 2021-01-18 DIAGNOSIS — R06.09 DOE (DYSPNEA ON EXERTION): ICD-10-CM

## 2021-01-18 DIAGNOSIS — I10 ESSENTIAL HYPERTENSION: ICD-10-CM

## 2021-01-18 DIAGNOSIS — I42.9 CARDIOMYOPATHY, UNSPECIFIED TYPE (HCC): Primary | ICD-10-CM

## 2021-01-18 PROCEDURE — 99214 OFFICE O/P EST MOD 30 MIN: CPT | Performed by: INTERNAL MEDICINE

## 2021-01-18 RX ORDER — LOSARTAN POTASSIUM AND HYDROCHLOROTHIAZIDE 25; 100 MG/1; MG/1
1 TABLET ORAL DAILY
Qty: 90 TABLET | Refills: 3 | Status: SHIPPED | OUTPATIENT
Start: 2021-01-18 | End: 2021-07-16

## 2021-01-18 NOTE — PROGRESS NOTES
Cardiac Follow up     Referring Provider:  FERNANDO Malik CNP     Chief Complaint   Patient presents with    Cardiomyopathy     no cardiac symptoms at this time    6 Month Follow-Up    f/up for cardiomyopathy  History of Present Illness: This 49 y.o. female. Her history includes cardiomyopathy. Initially, she was having worsening sob and cough over a few months. PMH includes smoking but is trying to quit. She has no other heart history. She had an echo 2017 and her EF was reduced to 30-35%, and 37% by 3D, then 35-45% by repeat 2D. She has a  history of hypertension was  tried on lisinopril; it caused a cough and was stopped. Today,she is feeling well. She has no chest pain, change in exertional shortness of breath palpitations or dizziness. No syncope. No orthopnea    Patient is compliant  with medication and is tolerating them well without side effects            Past Medical History:   has a past medical history of Allergic rhinitis, Cardiomyopathy ([address] Depression, Gout, and Hypertension. Surgical History:   has a past surgical history that includes Hysterectomy; cyst removal; Appendectomy; and partial hysterectomy (cervix not removed). Social History:   reports that she has been smoking. She has a 6.20 pack-year smoking history. She has never used smokeless tobacco. She reports current alcohol use of about 2.0 standard drinks of alcohol per week. She reports that she does not use drugs. Family History:  family history includes Diabetes in her father, maternal grandfather, maternal grandmother, mother, and another family member; High Blood Pressure in an other family member; Hypertension in her maternal grandfather and maternal grandmother; Lupus in her sister; No Known Problems in her paternal grandfather and paternal grandmother; Other in an other family member; Rheum Arthritis in her mother and sister.      Home Medications:  Outpatient Medications Marked as Taking for the 1/18/21 encounter (Office Visit) with Rosette Kussmaul, MD   Medication Sig Dispense Refill    losartan-hydroCHLOROthiazide (HYZAAR) 100-25 MG per tablet Take 1 tablet by mouth daily 90 tablet 3    cetirizine (ZYRTEC) 10 MG tablet Take 10 mg by mouth daily       Allergies:  Codeine and Lisinopril     Review of Systems:   · Constitutional: there has been no unanticipated weight loss. There's been no change in energy level, sleep pattern, or activity level. · Eyes: No visual changes or diplopia. No scleral icterus. · ENT: No Headaches, hearing loss or vertigo. No mouth sores or sore throat. · Cardiovascular: Reviewed in HPI  · Respiratory: No cough or wheezing, no sputum production. No hematemesis. · Gastrointestinal: No abdominal pain, appetite loss, blood in stools. No change in bowel or bladder habits. · Genitourinary: No dysuria, trouble voiding, or hematuria. · Musculoskeletal:  No gait disturbance, weakness or joint complaints. · Integumentary: No rash or pruritis. · Neurological: No headache, diplopia, change in muscle strength, numbness or tingling. No change in gait, balance, coordination, mood, affect, memory, mentation, behavior. · Psychiatric: No anxiety, no depression. · Endocrine: No malaise, fatigue or temperature intolerance. No excessive thirst, fluid intake, or urination. No tremor. · Hematologic/Lymphatic: No abnormal bruising or bleeding, blood clots or swollen lymph nodes. · Allergic/Immunologic: No nasal congestion or hives. Physical Examination:    Blood pressure 126/76, pulse 91, height 5' 7\" (1.702 m), weight 195 lb 11.2 oz (88.8 kg), SpO2 99 %, not currently breastfeeding. Constitutional and General Appearance:well developed well nourished female who appears stated age.   Skin:good turgor,intact without lesions  HEENT: EOMI ,normal  Neck:no JVD      Respiratory:  · Normal excursion and expansion without use of accessory muscles +SOBOE improving  · Resp Auscultation: Normal breath sounds without dullness  Cardiovascular:  · The apical impulse is not displaced  · Heart tones are crisp and normal  · Cervical veins are not engorged  · The carotid upstroke is normal in amplitude and contour without delay or bruit  · No M/G/R  · There is no clubbing, cyanosis of the extremities. · No edema  · Femoral Arteries: 2+ and equal  · Pedal Pulses: 2+ and equal   Abdomen:  · No masses or tenderness  · Bowel sounds present  · No organomegaly appreciated  Neurological/Psychiatric:  · Alert and oriented in all spheres  · Moves all extremities well  · Exhibits normal gait balance and coordination  · No abnormalities of mood, affect, memory, mentation, or behavior are noted    2D ECHO 12/14/18:  -Left ventricular size is at upper limits of normal .   -Normal left ventricular wall thickness.   -Global ejection fraction is mildly decreased and estimated from 45-50% .  -Global hypokinesis noted. E/e'= 7.85   -There is reversal of E/A inflow velocities across the mitral valve.   -Mild mitral regurgitation is present.   -There is mild tricuspid regurgitation with RVSP estimated at 24 mmHg. Assessment:     1. Cardiomyopathy ([address]): Stable, compensated. ECHO 5/26/17> EF 30-35% and 37% by 3D. RVSP 48 mmHg. ECHO 10/9/17> EF improved to 45%. ECHO  12/14/18> EF 45-50%. ECHO 3/06/20> EF 55%   Will repeat echo   2. SOB (shortness of breath on exertion), chronic: Stable, improved. 3. Hypertension: Stable. Blood pressure 126/76, pulse 91, height 5' 7\" (1.702 m), weight 195 lb 11.2 oz (88.8 kg), SpO2 99 %, not currently breastfeeding. 4. Tobacco dependence: Smoking cessation encouraged. Plan: Kindra Springer has a stable cardiac status. Cardiac test and lab results personally reviewed by me during this office visit and discussed. Metoprolol succinate (TOPROL XL) 50 MG extended release tablet -Take every other-day for 2 weeks - then stop.    Continue risk factor

## 2021-01-25 ENCOUNTER — HOSPITAL ENCOUNTER (OUTPATIENT)
Dept: NON INVASIVE DIAGNOSTICS | Age: 56
Discharge: HOME | End: 2021-01-25
Payer: COMMERCIAL

## 2021-01-25 DIAGNOSIS — I42.9 CARDIOMYOPATHY, UNSPECIFIED TYPE (HCC): ICD-10-CM

## 2021-01-25 DIAGNOSIS — R06.09 DOE (DYSPNEA ON EXERTION): ICD-10-CM

## 2021-01-25 LAB
LV EF: 50 %
LVEF MODALITY: NORMAL

## 2021-01-25 PROCEDURE — 93306 TTE W/DOPPLER COMPLETE: CPT

## 2021-01-26 ENCOUNTER — TELEPHONE (OUTPATIENT)
Dept: CARDIOLOGY CLINIC | Age: 56
End: 2021-01-26

## 2021-01-26 NOTE — TELEPHONE ENCOUNTER
----- Message from Linda Nunes MD sent at 1/26/2021 10:59 AM EST -----  Let her know that echo looks good

## 2021-01-26 NOTE — TELEPHONE ENCOUNTER
Patient notified of test results. Verbalized understanding and would like to know if she go back on the Metoprolol. Please advise.

## 2021-07-16 RX ORDER — LOSARTAN POTASSIUM AND HYDROCHLOROTHIAZIDE 25; 100 MG/1; MG/1
TABLET ORAL
Qty: 90 TABLET | Refills: 1 | Status: SHIPPED | OUTPATIENT
Start: 2021-07-16 | End: 2021-07-20 | Stop reason: SDUPTHER

## 2021-07-16 NOTE — TELEPHONE ENCOUNTER
Received refill request for  Losartan-Hydrochlorothiazide from  [facility].     Last ov: 01/18/2021 LES    Last labs: BMP 06/22/2017    Last Refill: 01/18/2021 #90 with 3 refills    Next appointment: 07/20/2021 LES

## 2021-07-20 ENCOUNTER — OFFICE VISIT (OUTPATIENT)
Dept: CARDIOLOGY CLINIC | Age: 56
End: 2021-07-20
Payer: COMMERCIAL

## 2021-07-20 VITALS
OXYGEN SATURATION: 97 % | WEIGHT: 196 LBS | DIASTOLIC BLOOD PRESSURE: 72 MMHG | HEIGHT: 67 IN | HEART RATE: 98 BPM | SYSTOLIC BLOOD PRESSURE: 126 MMHG | BODY MASS INDEX: 30.76 KG/M2

## 2021-07-20 DIAGNOSIS — I42.9 CARDIOMYOPATHY, UNSPECIFIED TYPE (HCC): Primary | ICD-10-CM

## 2021-07-20 DIAGNOSIS — R06.09 DOE (DYSPNEA ON EXERTION): ICD-10-CM

## 2021-07-20 DIAGNOSIS — Z72.0 TOBACCO ABUSE: ICD-10-CM

## 2021-07-20 DIAGNOSIS — I10 ESSENTIAL HYPERTENSION: ICD-10-CM

## 2021-07-20 PROCEDURE — 99214 OFFICE O/P EST MOD 30 MIN: CPT | Performed by: INTERNAL MEDICINE

## 2021-07-20 RX ORDER — LOSARTAN POTASSIUM AND HYDROCHLOROTHIAZIDE 25; 100 MG/1; MG/1
TABLET ORAL
Qty: 90 TABLET | Refills: 3 | Status: SHIPPED | OUTPATIENT
Start: 2021-07-20 | End: 2022-04-11

## 2021-07-20 NOTE — PROGRESS NOTES
Cardiac Follow up     Referring Provider:  Lajuan Essex, APRN - CNP     Chief Complaint   Patient presents with    6 Month Follow-Up    Hypertension    f/up for cardiomyopathy  History of Present Illness: This 49 y.o. female. Her history includes cardiomyopathy. Initially, she was having worsening sob and cough over a few months. PMH includes smoking but is trying to quit. She has no other heart history. She had an echo 2017 and her EF was reduced to 30-35%, and 37% by 3D, then 35-45% by repeat 2D. She has a  history of hypertension was  tried on lisinopril; it caused a cough and was stopped. Today,she is feeling ok. She has no chest pain, change in exertional shortness of breath palpitations or dizziness. No syncope. No orthopnea. She is working from home. She tries to walk for exercise 2-3x weekly. In process of getting new PCP. Patient is compliant  with medication and is tolerating them well without side effects    Past Medical History:   has a past medical history of Allergic rhinitis, Cardiomyopathy ([address] Depression, Gout, and Hypertension. Surgical History:   has a past surgical history that includes Hysterectomy; cyst removal; Appendectomy; and partial hysterectomy (cervix not removed). Social History:   reports that she has been smoking. She has a 6.20 pack-year smoking history. She has never used smokeless tobacco. She reports current alcohol use of about 2.0 standard drinks of alcohol per week. She reports that she does not use drugs. Family History:  family history includes Diabetes in her father, maternal grandfather, maternal grandmother, mother, and another family member; High Blood Pressure in an other family member; Hypertension in her maternal grandfather and maternal grandmother; Lupus in her sister; No Known Problems in her paternal grandfather and paternal grandmother; Other in an other family member; Rheum Arthritis in her mother and sister.      Home Medications:  No outpatient medications have been marked as taking for the 7/20/21 encounter (Office Visit) with Kyrie Fox MD.     Allergies:  Codeine and Lisinopril     Review of Systems:   · Constitutional: there has been no unanticipated weight loss. There's been no change in energy level, sleep pattern, or activity level. · Eyes: No visual changes or diplopia. No scleral icterus. · ENT: No Headaches, hearing loss or vertigo. No mouth sores or sore throat. · Cardiovascular: Reviewed in HPI  · Respiratory: No cough or wheezing, no sputum production. No hematemesis. · Gastrointestinal: No abdominal pain, appetite loss, blood in stools. No change in bowel or bladder habits. · Genitourinary: No dysuria, trouble voiding, or hematuria. · Musculoskeletal:  No gait disturbance, weakness or joint complaints. · Integumentary: No rash or pruritis. · Neurological: No headache, diplopia, change in muscle strength, numbness or tingling. No change in gait, balance, coordination, mood, affect, memory, mentation, behavior. · Psychiatric: No anxiety, no depression. · Endocrine: No malaise, fatigue or temperature intolerance. No excessive thirst, fluid intake, or urination. No tremor. · Hematologic/Lymphatic: No abnormal bruising or bleeding, blood clots or swollen lymph nodes. · Allergic/Immunologic: No nasal congestion or hives. Physical Examination:    Blood pressure 126/72, pulse 98, height 5' 7\" (1.702 m), weight 196 lb (88.9 kg), SpO2 97 %, not currently breastfeeding. Constitutional and General Appearance:well developed well nourished female who appears stated age.   Skin:good turgor,intact without lesions  HEENT: EOMI ,normal  Neck:no JVD      Respiratory:  · Normal excursion and expansion without use of accessory muscles +SOBOE improving  · Resp Auscultation: Normal breath sounds without dullness  Cardiovascular:  · The apical impulse is not displaced  · Heart tones are crisp and medical, surgical, social and family histories were reviewed and updated as appropriate. Scribe's attestation: This note was scribed in the presence of Dr. Bibiana Millan MD, by Shaneka Doran RN. The scribe's documentation has been prepared under my direction and personally reviewed by me in its entirety. I confirm that the note above accurately reflects all work, treatment, procedures, and medical decision making performed by me.

## 2021-07-29 ENCOUNTER — OFFICE VISIT (OUTPATIENT)
Dept: INTERNAL MEDICINE CLINIC | Age: 56
End: 2021-07-29
Payer: COMMERCIAL

## 2021-07-29 VITALS
SYSTOLIC BLOOD PRESSURE: 122 MMHG | WEIGHT: 196 LBS | BODY MASS INDEX: 30.76 KG/M2 | OXYGEN SATURATION: 98 % | DIASTOLIC BLOOD PRESSURE: 72 MMHG | HEIGHT: 67 IN | HEART RATE: 100 BPM

## 2021-07-29 DIAGNOSIS — Z00.00 ROUTINE GENERAL MEDICAL EXAMINATION AT A HEALTH CARE FACILITY: Primary | ICD-10-CM

## 2021-07-29 DIAGNOSIS — I42.9 CARDIOMYOPATHY, UNSPECIFIED TYPE (HCC): ICD-10-CM

## 2021-07-29 DIAGNOSIS — Z72.0 TOBACCO ABUSE: ICD-10-CM

## 2021-07-29 DIAGNOSIS — I10 ESSENTIAL HYPERTENSION: ICD-10-CM

## 2021-07-29 DIAGNOSIS — Z00.00 ROUTINE GENERAL MEDICAL EXAMINATION AT A HEALTH CARE FACILITY: ICD-10-CM

## 2021-07-29 LAB
A/G RATIO: 1.3 (ref 1.1–2.2)
ALBUMIN SERPL-MCNC: 4.3 G/DL (ref 3.4–5)
ALP BLD-CCNC: 94 U/L (ref 40–129)
ALT SERPL-CCNC: 13 U/L (ref 10–40)
ANION GAP SERPL CALCULATED.3IONS-SCNC: 14 MMOL/L (ref 3–16)
AST SERPL-CCNC: 12 U/L (ref 15–37)
BILIRUB SERPL-MCNC: <0.2 MG/DL (ref 0–1)
BUN BLDV-MCNC: 16 MG/DL (ref 7–20)
CALCIUM SERPL-MCNC: 9.2 MG/DL (ref 8.3–10.6)
CHLORIDE BLD-SCNC: 103 MMOL/L (ref 99–110)
CHOLESTEROL, FASTING: 209 MG/DL (ref 0–199)
CO2: 22 MMOL/L (ref 21–32)
CREAT SERPL-MCNC: 1 MG/DL (ref 0.6–1.1)
GFR AFRICAN AMERICAN: >60
GFR NON-AFRICAN AMERICAN: 58
GLOBULIN: 3.2 G/DL
GLUCOSE BLD-MCNC: 110 MG/DL (ref 70–99)
HCT VFR BLD CALC: 36.4 % (ref 36–48)
HDLC SERPL-MCNC: 38 MG/DL (ref 40–60)
HEMOGLOBIN: 12.2 G/DL (ref 12–16)
LDL CHOLESTEROL CALCULATED: 126 MG/DL
MCH RBC QN AUTO: 30.1 PG (ref 26–34)
MCHC RBC AUTO-ENTMCNC: 33.5 G/DL (ref 31–36)
MCV RBC AUTO: 89.8 FL (ref 80–100)
PDW BLD-RTO: 14.1 % (ref 12.4–15.4)
PLATELET # BLD: 206 K/UL (ref 135–450)
PMV BLD AUTO: 10.9 FL (ref 5–10.5)
POTASSIUM SERPL-SCNC: 4.2 MMOL/L (ref 3.5–5.1)
RBC # BLD: 4.05 M/UL (ref 4–5.2)
SODIUM BLD-SCNC: 139 MMOL/L (ref 136–145)
TOTAL PROTEIN: 7.5 G/DL (ref 6.4–8.2)
TRIGLYCERIDE, FASTING: 224 MG/DL (ref 0–150)
TSH REFLEX: 0.75 UIU/ML (ref 0.27–4.2)
VITAMIN D 25-HYDROXY: 14.6 NG/ML
VLDLC SERPL CALC-MCNC: 45 MG/DL
WBC # BLD: 9.1 K/UL (ref 4–11)

## 2021-07-29 PROCEDURE — 99386 PREV VISIT NEW AGE 40-64: CPT | Performed by: NURSE PRACTITIONER

## 2021-07-29 ASSESSMENT — PATIENT HEALTH QUESTIONNAIRE - PHQ9
SUM OF ALL RESPONSES TO PHQ QUESTIONS 1-9: 0
SUM OF ALL RESPONSES TO PHQ QUESTIONS 1-9: 0
2. FEELING DOWN, DEPRESSED OR HOPELESS: 0
DEPRESSION UNABLE TO ASSESS: FUNCTIONAL CAPACITY MOTIVATION LIMITS ACCURACY
1. LITTLE INTEREST OR PLEASURE IN DOING THINGS: 0
SUM OF ALL RESPONSES TO PHQ9 QUESTIONS 1 & 2: 0
SUM OF ALL RESPONSES TO PHQ QUESTIONS 1-9: 0

## 2021-07-29 ASSESSMENT — ENCOUNTER SYMPTOMS
BLOOD IN STOOL: 0
VOMITING: 0
COUGH: 0
CHEST TIGHTNESS: 0
NAUSEA: 0
EYE REDNESS: 0
DIARRHEA: 0
BACK PAIN: 0
RHINORRHEA: 0
SINUS PRESSURE: 0
EYE ITCHING: 0
CONSTIPATION: 0
WHEEZING: 0
SORE THROAT: 0
ABDOMINAL PAIN: 0
COLOR CHANGE: 0
SHORTNESS OF BREATH: 0

## 2021-07-29 NOTE — ASSESSMENT & PLAN NOTE
She is followed by Dr. Ailyn Gaona with cardiology   Most recent ECHO is stable with EF 50%  She has no sob or cp  Weights are stable

## 2021-07-29 NOTE — ASSESSMENT & PLAN NOTE
The risks related to smoking were reviewed with the patient. Recommend maintaining a smoke-free lifestyle. Products available for smoking cessation were discussed in detail.

## 2021-07-29 NOTE — PROGRESS NOTES
Subjective:     CC:  New Patient      HPI:  Julio Carrasquillo is here for a comprehensive physical exam and to establish care. She was seen by previous provider who retired and would like to establish care with this office. She has a history of cardiomyopathy. Was originally diagnosed with an ejection fraction in the 30s. She is followed by Dr. Bianca Kapoor and most recent echocardiogram showed an EF of 55%. She is taking Hyzaar and is doing well with this. She states she has no chest pain, shortness of breath, difficulty breathing, or swelling. She monitors her weight. She follows with Dr. Joselyn Redmond every 6 months. She states that her only concern today for environmental allergies she has been taking Zyrtec but find that this is not effective for her anymore.     Vitals:    07/29/21 1545   BP: 122/72   Pulse: 100   SpO2: 98%       Wt Readings from Last 3 Encounters:   07/29/21 196 lb (88.9 kg)   07/20/21 196 lb (88.9 kg)   01/18/21 195 lb 11.2 oz (88.8 kg)       Past Medical History:   Diagnosis Date    Allergic rhinitis     Cardiomyopathy ([address]     Depression     Gout     Hypertension        Past Surgical History:   Procedure Laterality Date    APPENDECTOMY      CYST REMOVAL      neck    HYSTERECTOMY      PARTIAL HYSTERECTOMY         Family History   Problem Relation Age of Onset    Diabetes Mother    [facility] Rheum Arthritis Mother     Diabetes Father     Diabetes Other     High Blood Pressure Other     Other Other         CVA    Lupus Sister     Rheum Arthritis Sister     Diabetes Maternal Grandmother     Hypertension Maternal Grandmother     Diabetes Maternal Grandfather     Hypertension Maternal Grandfather     No Known Problems Paternal Grandmother     No Known Problems Paternal Grandfather     Heart Disease Neg Hx        Social History     Tobacco Use    Smoking status: Current Every Day Smoker     Packs/day: 0.20     Years: 31.00     Pack years: 6.20    Smokeless tobacco: Never Used   Vaping Use    Vaping Use: Never used   Substance Use Topics    Alcohol use: Yes     Alcohol/week: 2.0 standard drinks     Types: 2 Glasses of wine per week     Comment: [id number]    Drug use: No       Immunization History   Administered Date(s) Administered    Pneumococcal Polysaccharide ([id number]) 02/24/2015    Tdap (Boostrix, Adacel) 02/24/2015       Health Maintenance   Topic Date Due    Hepatitis C screen  Never done    HIV screen  Never done    Cervical cancer screen  Never done    Lipid screen  Never done    Colon cancer screen colonoscopy  Never done    Breast cancer screen  01/01/2017    Shingles Vaccine (1 of 2) Never done    Potassium monitoring  06/22/2018    Creatinine monitoring  06/22/2018    A1C test (Diabetic or Prediabetic)  07/30/2021    Flu vaccine (1) 09/01/2021    DTaP/Tdap/Td vaccine (2 - Td or Tdap) 02/24/2025    Pneumococcal 0-64 years Vaccine (2 of 2 - PPSV23) 01/01/2032    COVID-19 Vaccine  Completed    Hepatitis A vaccine  Aged Out    Hepatitis B vaccine  Aged Out    Hib vaccine  Aged Out    Meningococcal (ACWY) vaccine  Aged Out       Review of Systems   Constitutional: Negative for chills, fatigue and fever. HENT: Negative for congestion, ear pain, postnasal drip, rhinorrhea, sinus pressure, sneezing and sore throat. Eyes: Negative for redness and itching. Respiratory: Negative for cough, chest tightness, shortness of breath and wheezing. Cardiovascular: Negative for chest pain and palpitations. Gastrointestinal: Negative for abdominal pain, blood in stool, constipation, diarrhea, nausea and vomiting. Endocrine: Negative for cold intolerance and heat intolerance. Genitourinary: Negative for difficulty urinating, dysuria, flank pain, frequency, hematuria and urgency. Musculoskeletal: Negative for arthralgias, back pain, joint swelling and myalgias. Skin: Negative for color change, pallor, rash and wound.    Allergic/Immunologic: Negative for environmental allergies and food allergies. Neurological: Negative for dizziness, seizures, syncope, weakness, light-headedness, numbness and headaches. Hematological: Negative for adenopathy. Does not bruise/bleed easily. Psychiatric/Behavioral: Negative for confusion, sleep disturbance and suicidal ideas. The patient is not nervous/anxious and is not hyperactive. Objective:     Physical Exam  Constitutional:       Appearance: Normal appearance. She is normal weight. HENT:      Head: Normocephalic and atraumatic. Right Ear: Tympanic membrane, ear canal and external ear normal.      Left Ear: Tympanic membrane, ear canal and external ear normal.      Nose: Nose normal.      Mouth/Throat:      Mouth: Mucous membranes are moist.   Eyes:      Extraocular Movements: Extraocular movements intact. Conjunctiva/sclera: Conjunctivae normal.      Pupils: Pupils are equal, round, and reactive to light. Cardiovascular:      Rate and Rhythm: Normal rate and regular rhythm. Pulses: Normal pulses. Heart sounds: Normal heart sounds. Pulmonary:      Effort: Pulmonary effort is normal.      Breath sounds: Normal breath sounds. No wheezing. Abdominal:      General: Abdomen is flat. Bowel sounds are normal.      Palpations: Abdomen is soft. Tenderness: There is no abdominal tenderness. Musculoskeletal:         General: Normal range of motion. Cervical back: Normal range of motion and neck supple. Skin:     General: Skin is warm and dry. Neurological:      General: No focal deficit present. Mental Status: She is alert and oriented to person, place, and time. Psychiatric:         Mood and Affect: Mood normal.         Behavior: Behavior normal.         Thought Content:  Thought content normal.         Assessment:      See ProblemList assessment and plan       PHQ Scores 7/29/2021 7/30/2020   PHQ2 Score 0 0   PHQ9 Score 0 0     Interpretation of Total Score Depression Severity: 1-4 = Minimal depression, 5-9 = Milddepression, 10-14 = Moderate depression, 15-19 = Moderately severe depression, 20-27 = Severe depression    Plan:      Routine general medical examination at a health care facility   Well exam in office  Fasting labs ordered  Reviewed   Referral for Colonoscopy and Mammogram     Tobacco abuse  The risks related to smoking were reviewed with the patient. Recommend maintaining a smoke-free lifestyle. Products available for smoking cessation were discussed in detail. Essential hypertension   Stable, controlled  No changes  Continue current treatment plan       Cardiomyopathy [facility])   She is followed by Dr. Buffy Handley with cardiology   Most recent ECHO is stable with EF 50%  She has no sob or cp  Weights are stable                   Discussed over the counter medication with patient. [place] received counseling on the following healthybehaviors: nutrition, exercise, and medication adherence    Patient given educational materials on their chronic medical conditions    Discussed use, benefit, and side effects of prescribed medications. Barriersto medication compliance addressed. All patient questions answered. Patient voiced understanding. Medications reviewed and patient understands.   Questions answered

## 2021-08-02 ENCOUNTER — VIRTUAL VISIT (OUTPATIENT)
Dept: INTERNAL MEDICINE CLINIC | Age: 56
End: 2021-08-02
Payer: COMMERCIAL

## 2021-08-02 DIAGNOSIS — J40 BRONCHITIS: ICD-10-CM

## 2021-08-02 PROCEDURE — 99214 OFFICE O/P EST MOD 30 MIN: CPT | Performed by: NURSE PRACTITIONER

## 2021-08-02 RX ORDER — ALBUTEROL SULFATE 90 UG/1
2 AEROSOL, METERED RESPIRATORY (INHALATION) 4 TIMES DAILY PRN
Qty: 3 INHALER | Refills: 1 | Status: SHIPPED | OUTPATIENT
Start: 2021-08-02

## 2021-08-02 RX ORDER — DOXYCYCLINE HYCLATE 100 MG
100 TABLET ORAL 2 TIMES DAILY
Qty: 14 TABLET | Refills: 0 | Status: SHIPPED | OUTPATIENT
Start: 2021-08-02 | End: 2021-08-09

## 2021-08-02 RX ORDER — PREDNISONE 20 MG/1
20 TABLET ORAL 2 TIMES DAILY
Qty: 10 TABLET | Refills: 0 | Status: SHIPPED | OUTPATIENT
Start: 2021-08-02 | End: 2021-08-07

## 2021-08-02 ASSESSMENT — ENCOUNTER SYMPTOMS
DIARRHEA: 0
BLOOD IN STOOL: 0
COUGH: 1
WHEEZING: 1
VOMITING: 0
EYE REDNESS: 0
BACK PAIN: 0
SHORTNESS OF BREATH: 1
ABDOMINAL PAIN: 0
RHINORRHEA: 0
COLOR CHANGE: 0
CHEST TIGHTNESS: 0
SINUS PRESSURE: 0
EYE ITCHING: 0
NAUSEA: 0
SORE THROAT: 0
CONSTIPATION: 0

## 2021-08-02 NOTE — PROGRESS NOTES
Soo Maya (:  1965) is a 49 y.o. female,Established patient, here for evaluation of the following chief complaint(s): Fever         ASSESSMENT/PLAN:  1. Bronchitis  Assessment & Plan:   Symptoms consistent with bronchitis but given virtual visit and unable to evaluate for temperature suspect fevers for possibility of pneumonia so we will also cover with doxycycline for a rib coverage. Prednisone, doxycycline, and albuterol sent to pharmacy. Patient advised to start treatment today and to reach out if no improvement. No follow-ups on file. SUBJECTIVE/OBJECTIVE:  HPI   Patient on a virtual visit today with reports of cough, congestion, sinus pain pressure, shortness of breath, and wheezing. Reports cough is productive of sputum. She has chills but has not checked her temperature. She has not been exposed to Covid she is aware of and is had both vaccines. She is a smoker. She denies any chest pain. She has been taking over-the-counter medications without improvement. She has taken NyQuil and Aleve. She states she does not have any inhalers and has not been able to use any. Review of Systems   Constitutional: Positive for chills and fatigue. Negative for fever. HENT: Positive for congestion. Negative for ear pain, postnasal drip, rhinorrhea, sinus pressure, sneezing and sore throat. Eyes: Negative for redness and itching. Respiratory: Positive for cough, shortness of breath and wheezing. Negative for chest tightness. Cardiovascular: Negative for chest pain and palpitations. Gastrointestinal: Negative for abdominal pain, blood in stool, constipation, diarrhea, nausea and vomiting. Endocrine: Negative for cold intolerance and heat intolerance. Genitourinary: Negative for difficulty urinating, dysuria, flank pain, frequency, hematuria and urgency. Musculoskeletal: Negative for arthralgias, back pain, joint swelling and myalgias.    Skin: Negative for color change, pallor, rash and wound. Allergic/Immunologic: Negative for environmental allergies and food allergies. Neurological: Negative for dizziness, seizures, syncope, weakness, light-headedness, numbness and headaches. Hematological: Negative for adenopathy. Does not bruise/bleed easily. Psychiatric/Behavioral: Negative for confusion, sleep disturbance and suicidal ideas. The patient is not nervous/anxious and is not hyperactive. No flowsheet data found.      Physical Exam    [INSTRUCTIONS:  \"[x]\" Indicates a positive item  \"[]\" Indicates a negative item  -- DELETE ALL ITEMS NOT EXAMINED]    Constitutional: [x] Appears well-developed and well-nourished [x] No apparent distress      [] Abnormal -     Mental status: [x] Alert and awake  [x] Oriented to person/place/time [x] Able to follow commands    [] Abnormal -     Eyes:   EOM    [x]  Normal    [] Abnormal -   Sclera  [x]  Normal    [] Abnormal -          Discharge [x]  None visible   [] Abnormal -     HENT: [x] Normocephalic, atraumatic  [] Abnormal -   [x] Mouth/Throat: Mucous membranes are moist    External Ears [x] Normal  [] Abnormal -    Neck: [x] No visualized mass [] Abnormal -     Pulmonary/Chest: [x] Respiratory effort normal   [x] No visualized signs of difficulty breathing or respiratory distress        [] Abnormal -      Musculoskeletal:   [x] Normal gait with no signs of ataxia         [x] Normal range of motion of neck        [] Abnormal -     Neurological:        [x] No Facial Asymmetry (Cranial nerve 7 motor function) (limited exam due to video visit)          [x] No gaze palsy        [] Abnormal -          Skin:        [x] No significant exanthematous lesions or discoloration noted on facial skin         [] Abnormal -            Psychiatric:       [x] Normal Affect [] Abnormal -        [x] No Hallucinations    Other pertinent observable physical exam findings:-          On this date 8/2/2021 I have spent 30 minutes reviewing previous notes, test results and face to face (virtual) with the patient discussing the diagnosis and importance of compliance with the treatment plan as well as documenting on the day of the visit. Billy Essence was evaluated through a synchronous (real-time) audio-video encounter. The patient (or guardian if applicable) is aware that this is a billable service. Verbal consent to proceed has been obtained within the past 12 months. The visit was conducted pursuant to the emergency declaration under the [address] [place] [address], [address] [place] [address] and the [employer] General Act. Patient identification was verified, and a caregiver was present when appropriate. The patient was located in a state where the provider was credentialed to provide care. An electronic signature was used to authenticate this note.     --Prieto Edwards, FERNANDO - CNP

## 2021-08-02 NOTE — ASSESSMENT & PLAN NOTE
Symptoms consistent with bronchitis but given virtual visit and unable to evaluate for temperature suspect fevers for possibility of pneumonia so we will also cover with doxycycline for a rib coverage. Prednisone, doxycycline, and albuterol sent to pharmacy. Patient advised to start treatment today and to reach out if no improvement.

## 2021-08-28 PROBLEM — Z00.00 ROUTINE GENERAL MEDICAL EXAMINATION AT A HEALTH CARE FACILITY: Status: RESOLVED | Noted: 2021-07-29 | Resolved: 2021-08-28

## 2021-09-28 ENCOUNTER — NURSE TRIAGE (OUTPATIENT)
Dept: OTHER | Facility: CLINIC | Age: 56
End: 2021-09-28

## 2021-09-28 NOTE — TELEPHONE ENCOUNTER
Received call from [address] at [facility] with Red Flag Complaint. Brief description of triage: as above left foot santiago big toe possible gout has been going on for 2 weeks some swelling no redness 6-7/10    Triage indicates for patient to be seen in 3 days    Care advice provided, patient verbalizes understanding; denies any other questions or concerns; instructed to call back for any new or worsening symptoms. Writer provided warm transfer to Santino Harrison at [facility] for appointment scheduling. Attention Provider: Thank you for allowing me to participate in the care of your patient. The patient was connected to triage in response to information provided to the ECC/PSC. Please do not respond through this encounter as the response is not directed to a shared pool. Reason for Disposition   MODERATE pain (e.g., interferes with normal activities, limping) and present > 3 days    Answer Assessment - Initial Assessment Questions  1. ONSET: \"When did the pain start? \"       2 weeks    2. LOCATION: \"Where is the pain located? \"       Left foot santiago toe area    3. PAIN: \"How bad is the pain? \"    (Scale 1-10; or mild, moderate, severe)    -  MILD (1-3): doesn't interfere with normal activities     -  MODERATE (4-7): interferes with normal activities (e.g., work or school) or awakens from sleep, limping     -  SEVERE (8-10): excruciating pain, unable to do any normal activities, unable to walk      7/10    4. WORK OR EXERCISE: \"Has there been any recent work or exercise that involved this part of the body? \"       Denies    5. CAUSE: \"What do you think is causing the foot pain? \"      Possible gout    6. OTHER SYMPTOMS: \"Do you have any other symptoms? \" (e.g., leg pain, rash, fever, numbness)      Swelling to foot santiago left big toe    7. PREGNANCY: \"Is there any chance you are pregnant? \" \"When was your last menstrual period? \"      n/a    Protocols used: FOOT PAIN-ADULT-[place]

## 2021-09-30 ENCOUNTER — OFFICE VISIT (OUTPATIENT)
Dept: INTERNAL MEDICINE CLINIC | Age: 56
End: 2021-09-30
Payer: COMMERCIAL

## 2021-09-30 VITALS
SYSTOLIC BLOOD PRESSURE: 130 MMHG | DIASTOLIC BLOOD PRESSURE: 72 MMHG | HEART RATE: 100 BPM | OXYGEN SATURATION: 99 % | WEIGHT: 193 LBS | HEIGHT: 67 IN | BODY MASS INDEX: 30.29 KG/M2

## 2021-09-30 DIAGNOSIS — M10.472 ACUTE GOUT DUE TO OTHER SECONDARY CAUSE INVOLVING TOE OF LEFT FOOT: Primary | ICD-10-CM

## 2021-09-30 DIAGNOSIS — Z12.11 COLON CANCER SCREENING: ICD-10-CM

## 2021-09-30 DIAGNOSIS — Z12.31 ENCOUNTER FOR SCREENING MAMMOGRAM FOR MALIGNANT NEOPLASM OF BREAST: ICD-10-CM

## 2021-09-30 PROBLEM — M10.9 GOUT: Status: ACTIVE | Noted: 2021-09-30

## 2021-09-30 PROCEDURE — 90674 CCIIV4 VAC NO PRSV 0.5 ML IM: CPT | Performed by: NURSE PRACTITIONER

## 2021-09-30 PROCEDURE — 90471 IMMUNIZATION ADMIN: CPT | Performed by: NURSE PRACTITIONER

## 2021-09-30 PROCEDURE — 99214 OFFICE O/P EST MOD 30 MIN: CPT | Performed by: NURSE PRACTITIONER

## 2021-09-30 RX ORDER — COLCHICINE 0.6 MG/1
0.6 TABLET ORAL DAILY
Qty: 30 TABLET | Refills: 3 | Status: ON HOLD | OUTPATIENT
Start: 2021-09-30 | End: 2022-04-18

## 2021-09-30 ASSESSMENT — ENCOUNTER SYMPTOMS
EYE ITCHING: 0
NAUSEA: 0
VOMITING: 0
EYE REDNESS: 0
SORE THROAT: 0
WHEEZING: 0
CHEST TIGHTNESS: 0
COUGH: 0
SINUS PRESSURE: 0
BACK PAIN: 0
CONSTIPATION: 0
BLOOD IN STOOL: 0
SHORTNESS OF BREATH: 0
DIARRHEA: 0
RHINORRHEA: 0
COLOR CHANGE: 0
ABDOMINAL PAIN: 0

## 2021-09-30 ASSESSMENT — PATIENT HEALTH QUESTIONNAIRE - PHQ9
SUM OF ALL RESPONSES TO PHQ QUESTIONS 1-9: 0
SUM OF ALL RESPONSES TO PHQ QUESTIONS 1-9: 0
1. LITTLE INTEREST OR PLEASURE IN DOING THINGS: 0
SUM OF ALL RESPONSES TO PHQ QUESTIONS 1-9: 0
DEPRESSION UNABLE TO ASSESS: FUNCTIONAL CAPACITY MOTIVATION LIMITS ACCURACY
2. FEELING DOWN, DEPRESSED OR HOPELESS: 0
SUM OF ALL RESPONSES TO PHQ9 QUESTIONS 1 & 2: 0

## 2021-09-30 NOTE — PROGRESS NOTES
Aleyda Wing (:  1965) is a 49 y.o. female,Established patient, here for evaluation of the following chief complaint(s):  Gout      ASSESSMENT/PLAN:  1. Acute gout due to other secondary cause involving toe of left foot  Assessment & Plan:  First gout attack was in 2018. Hasn't had a flair since. Noticed symptoms after having shrimp and wine at a wedding. Has iced the toe and and taken Advil without benefit. Orders:  -     URIC ACID; Future  2. Encounter for screening mammogram for malignant neoplasm of breast  Assessment & Plan:  Routine screening. Orders:  -     TADEO DIGITAL SCREEN W OR WO CAD BILATERAL; Future  3. Colon cancer screening  Assessment & Plan:  Routine screening. Orders:  -     Tori Lino MD, Gastroenterology, [facility]      No follow-ups on file. SUBJECTIVE/OBJECTIVE:  Stephanie Hunt is in the office today with complaints of 3 weeks of left toe pain and swelling. She first had gout two years ago and hasn't had another flair up since. She states the flair up of her current symptoms occurred after having shrimp and wine at a wedding. She is overdue for routine mammogram and colonoscopy but is aware that she needs to get these completed. Current Outpatient Medications   Medication Sig Dispense Refill    colchicine (COLCRYS) 0.6 MG tablet Take 1 tablet by mouth daily 1.2 mg at the first sign of flare, followed in 1 hour with a single dose of 0.6 mg max of 1.8 mg daily 30 tablet 3    albuterol sulfate HFA (VENTOLIN HFA) 108 (90 Base) MCG/ACT inhaler Inhale 2 puffs into the lungs 4 times daily as needed for Wheezing 3 Inhaler 1    losartan-hydroCHLOROthiazide (HYZAAR) 100-25 MG per tablet TAKE 1 TABLET BY MOUTH ONE TIME A DAY 90 tablet 3    cetirizine (ZYRTEC) 10 MG tablet Take 10 mg by mouth daily       No current facility-administered medications for this visit. Review of Systems   Constitutional: Negative for chills, fatigue and fever.    HENT: Negative for congestion, ear pain, postnasal drip, rhinorrhea, sinus pressure, sneezing and sore throat. Eyes: Negative for redness and itching. Respiratory: Negative for cough, chest tightness, shortness of breath and wheezing. Cardiovascular: Negative for chest pain and palpitations. Gastrointestinal: Negative for abdominal pain, blood in stool, constipation, diarrhea, nausea and vomiting. Endocrine: Negative for cold intolerance and heat intolerance. Genitourinary: Negative for difficulty urinating, dysuria, flank pain, frequency, hematuria and urgency. Musculoskeletal: Positive for arthralgias. Negative for back pain, joint swelling and myalgias. Left toe   Skin: Negative for color change, pallor, rash and wound. Allergic/Immunologic: Negative for environmental allergies and food allergies. Neurological: Negative for dizziness, seizures, syncope, weakness, light-headedness, numbness and headaches. Hematological: Negative for adenopathy. Does not bruise/bleed easily. Psychiatric/Behavioral: Negative for confusion, sleep disturbance and suicidal ideas. The patient is not nervous/anxious and is not hyperactive. Vitals:    09/30/21 1430   BP: 130/72   Site: Left Upper Arm   Position: Sitting   Cuff Size: Large Adult   Pulse: 100   SpO2: 99%   Weight: 193 lb (87.5 kg)   Height: 5' 7\" (1.702 m)       Physical Exam  Constitutional:       Appearance: Normal appearance. She is well-developed. HENT:      Head: Normocephalic and atraumatic. Right Ear: Hearing normal.      Left Ear: Hearing normal.      Nose: No mucosal edema. Right Sinus: No maxillary sinus tenderness or frontal sinus tenderness. Left Sinus: No maxillary sinus tenderness or frontal sinus tenderness. Mouth/Throat: Tonsils: No tonsillar abscesses. Eyes:      Extraocular Movements: Extraocular movements intact. Pupils: Pupils are equal, round, and reactive to light.    Cardiovascular: Rate and Rhythm: Normal rate and regular rhythm. Pulses: Normal pulses. Heart sounds: Normal heart sounds. Pulmonary:      Effort: Pulmonary effort is normal.      Breath sounds: Normal breath sounds. Musculoskeletal:         General: Swelling and tenderness present. Comments: Left foot/great toe   Lymphadenopathy:      Head:      Right side of head: No submental, submandibular, tonsillar, preauricular, posterior auricular or occipital adenopathy. Left side of head: No submental, submandibular, tonsillar, preauricular, posterior auricular or occipital adenopathy. Skin:     General: Skin is warm and dry. Capillary Refill: Capillary refill takes less than 2 seconds. Neurological:      Mental Status: She is alert. Psychiatric:         Mood and Affect: Mood normal.         Behavior: Behavior normal.                 An electronic signature was used to authenticate this note.     --FERNANDO Lozada - CNP

## 2021-09-30 NOTE — ASSESSMENT & PLAN NOTE
First gout attack was in 2018. Hasn't had a flair since. Noticed symptoms after having shrimp and wine at a wedding. Has iced the toe and and taken Advil without benefit.

## 2021-10-01 ENCOUNTER — TELEPHONE (OUTPATIENT)
Dept: INTERNAL MEDICINE CLINIC | Age: 56
End: 2021-10-01

## 2021-10-01 RX ORDER — PREDNISONE 20 MG/1
20 TABLET ORAL 2 TIMES DAILY
Qty: 10 TABLET | Refills: 0 | Status: SHIPPED | OUTPATIENT
Start: 2021-10-01 | End: 2021-10-06

## 2021-10-01 NOTE — TELEPHONE ENCOUNTER
Pt asking if she can start prednisone today after taking colchicine (COLCRYS) 0.6 MG tablet this morning  Saundra advised she can take the prednisone

## 2021-10-01 NOTE — TELEPHONE ENCOUNTER
Pt is still in pain  She is on her second day of colchicine (COLCRYS) 0.6 MG tablet   She still has tingling and burning  Swelling has not gone down

## 2021-10-04 NOTE — TELEPHONE ENCOUNTER
Spoke to the patinet. She is taking both at the same time. Called and confirmed this with Chemo Durán. She should take ONLY the Prednisone and STOP the Colcrys for now.

## 2021-10-30 PROBLEM — Z12.31 ENCOUNTER FOR SCREENING MAMMOGRAM FOR MALIGNANT NEOPLASM OF BREAST: Status: RESOLVED | Noted: 2021-09-30 | Resolved: 2021-10-30

## 2021-10-30 PROBLEM — Z12.11 COLON CANCER SCREENING: Status: RESOLVED | Noted: 2021-09-30 | Resolved: 2021-10-30

## 2021-11-04 ENCOUNTER — ANESTHESIA EVENT (OUTPATIENT)
Dept: ENDOSCOPY | Age: 56
End: 2021-11-04
Payer: COMMERCIAL

## 2021-11-05 ENCOUNTER — ANESTHESIA (OUTPATIENT)
Dept: ENDOSCOPY | Age: 56
End: 2021-11-05
Payer: COMMERCIAL

## 2021-11-05 ENCOUNTER — HOSPITAL ENCOUNTER (OUTPATIENT)
Age: 56
Setting detail: OUTPATIENT SURGERY
Discharge: HOME | End: 2021-11-05
Attending: INTERNAL MEDICINE | Admitting: INTERNAL MEDICINE
Payer: COMMERCIAL

## 2021-11-05 VITALS
SYSTOLIC BLOOD PRESSURE: 135 MMHG | HEART RATE: 86 BPM | WEIGHT: 189 LBS | TEMPERATURE: 96.1 F | OXYGEN SATURATION: 100 % | RESPIRATION RATE: 16 BRPM | BODY MASS INDEX: 29.66 KG/M2 | HEIGHT: 67 IN | DIASTOLIC BLOOD PRESSURE: 79 MMHG

## 2021-11-05 VITALS
RESPIRATION RATE: 22 BRPM | DIASTOLIC BLOOD PRESSURE: 55 MMHG | OXYGEN SATURATION: 100 % | SYSTOLIC BLOOD PRESSURE: 92 MMHG

## 2021-11-05 DIAGNOSIS — Z12.11 SCREENING FOR COLON CANCER: ICD-10-CM

## 2021-11-05 PROCEDURE — 88305 TISSUE EXAM BY PATHOLOGIST: CPT

## 2021-11-05 PROCEDURE — 3700000001 HC ADD 15 MINUTES (ANESTHESIA): Performed by: INTERNAL MEDICINE

## 2021-11-05 PROCEDURE — 6360000002 HC RX W HCPCS: Performed by: NURSE ANESTHETIST, CERTIFIED REGISTERED

## 2021-11-05 PROCEDURE — 7100000011 HC PHASE II RECOVERY - ADDTL 15 MIN: Performed by: INTERNAL MEDICINE

## 2021-11-05 PROCEDURE — 3609010600 HC COLONOSCOPY POLYPECTOMY SNARE/COLD BIOPSY: Performed by: INTERNAL MEDICINE

## 2021-11-05 PROCEDURE — 7100000010 HC PHASE II RECOVERY - FIRST 15 MIN: Performed by: INTERNAL MEDICINE

## 2021-11-05 PROCEDURE — 2709999900 HC NON-CHARGEABLE SUPPLY: Performed by: INTERNAL MEDICINE

## 2021-11-05 PROCEDURE — 2580000003 HC RX 258: Performed by: ANESTHESIOLOGY

## 2021-11-05 PROCEDURE — 2720000010 HC SURG SUPPLY STERILE: Performed by: INTERNAL MEDICINE

## 2021-11-05 PROCEDURE — 3700000000 HC ANESTHESIA ATTENDED CARE: Performed by: INTERNAL MEDICINE

## 2021-11-05 RX ORDER — SODIUM CHLORIDE 9 MG/ML
25 INJECTION, SOLUTION INTRAVENOUS PRN
Status: DISCONTINUED | OUTPATIENT
Start: 2021-11-05 | End: 2021-11-05 | Stop reason: HOSPADM

## 2021-11-05 RX ORDER — SODIUM CHLORIDE 0.9 % (FLUSH) 0.9 %
10 SYRINGE (ML) INJECTION EVERY 12 HOURS SCHEDULED
Status: DISCONTINUED | OUTPATIENT
Start: 2021-11-05 | End: 2021-11-05 | Stop reason: HOSPADM

## 2021-11-05 RX ORDER — SODIUM CHLORIDE, SODIUM LACTATE, POTASSIUM CHLORIDE, CALCIUM CHLORIDE 600; 310; 30; 20 MG/100ML; MG/100ML; MG/100ML; MG/100ML
INJECTION, SOLUTION INTRAVENOUS CONTINUOUS
Status: DISCONTINUED | OUTPATIENT
Start: 2021-11-05 | End: 2021-11-05 | Stop reason: HOSPADM

## 2021-11-05 RX ORDER — PROPOFOL 10 MG/ML
INJECTION, EMULSION INTRAVENOUS PRN
Status: DISCONTINUED | OUTPATIENT
Start: 2021-11-05 | End: 2021-11-05 | Stop reason: SDUPTHER

## 2021-11-05 RX ORDER — SODIUM CHLORIDE 9 MG/ML
INJECTION, SOLUTION INTRAVENOUS CONTINUOUS
Status: DISCONTINUED | OUTPATIENT
Start: 2021-11-05 | End: 2021-11-05 | Stop reason: HOSPADM

## 2021-11-05 RX ORDER — SODIUM CHLORIDE 0.9 % (FLUSH) 0.9 %
10 SYRINGE (ML) INJECTION PRN
Status: DISCONTINUED | OUTPATIENT
Start: 2021-11-05 | End: 2021-11-05 | Stop reason: HOSPADM

## 2021-11-05 ASSESSMENT — PULMONARY FUNCTION TESTS
PIF_VALUE: 0

## 2021-11-05 ASSESSMENT — PAIN - FUNCTIONAL ASSESSMENT: PAIN_FUNCTIONAL_ASSESSMENT: 0-10

## 2021-11-05 ASSESSMENT — PAIN DESCRIPTION - DESCRIPTORS: DESCRIPTORS: SORE

## 2021-11-05 ASSESSMENT — PAIN DESCRIPTION - FREQUENCY: FREQUENCY: INTERMITTENT

## 2021-11-05 ASSESSMENT — PAIN DESCRIPTION - LOCATION: LOCATION: RECTUM

## 2021-11-05 ASSESSMENT — ENCOUNTER SYMPTOMS: SHORTNESS OF BREATH: 1

## 2021-11-05 ASSESSMENT — LIFESTYLE VARIABLES: SMOKING_STATUS: 1

## 2021-11-05 ASSESSMENT — PAIN DESCRIPTION - PAIN TYPE: TYPE: ACUTE PAIN

## 2021-11-05 ASSESSMENT — PAIN DESCRIPTION - ORIENTATION: ORIENTATION: LOWER

## 2021-11-05 ASSESSMENT — PAIN DESCRIPTION - ONSET: ONSET: GRADUAL

## 2021-11-05 ASSESSMENT — PAIN SCALES - GENERAL: PAINLEVEL_OUTOF10: 5

## 2021-11-05 NOTE — ANESTHESIA PRE PROCEDURE
Department of Anesthesiology  Preprocedure Note       Name:  Evelin Wilson   Age:  49 y.o.  :  1965                                          MRN:  [id number]         Date:  2021      Surgeon: Ken Munguia):  David Davis MD    Procedure: Procedure(s):  COLONOSCOPY    Medications prior to admission:   Prior to Admission medications    Medication Sig Start Date End Date Taking? Authorizing Provider   colchicine (COLCRYS) 0.6 MG tablet Take 1 tablet by mouth daily 1.2 mg at the first sign of flare, followed in 1 hour with a single dose of 0.6 mg max of 1.8 mg daily 21   Jermaine Ratel, APRN - CNP   albuterol sulfate HFA (VENTOLIN HFA) 108 (90 Base) MCG/ACT inhaler Inhale 2 puffs into the lungs 4 times daily as needed for Wheezing 21   Jeramine Ratel, APRN - CNP   losartan-hydroCHLOROthiazide (HYZAAR) 100-25 MG per tablet TAKE 1 TABLET BY MOUTH ONE TIME A DAY 21   Edwin Mitchell MD   cetirizine (ZYRTEC) 10 MG tablet Take 10 mg by mouth daily    Historical Provider, MD       Current medications:    Current Facility-Administered Medications   Medication Dose Route Frequency Provider Last Rate Last Admin    sodium chloride flush 0.9 % injection 10 mL  10 mL IntraVENous 2 times per day Caye Padmini, DO        sodium chloride flush 0.9 % injection 10 mL  10 mL IntraVENous PRN Caye Padmini, DO        0.9 % sodium chloride infusion  25 mL IntraVENous PRN Caye Padmini, DO        0.9 % sodium chloride infusion   IntraVENous Continuous Caye Padmini, DO        lactated ringers infusion   IntraVENous Continuous Caye Padmini, DO           Allergies:     Allergies   Allergen Reactions    Codeine      HALLUCANTIONS      Lisinopril        Problem List:    Patient Active Problem List   Diagnosis Code    Cardiomyopathy ([facility] [address] I42.9    DELUNA (dyspnea on exertion) R06.00    Tobacco abuse Z72.0    Alcohol ingestion Z78.9    Chronic gout of left foot M1A.0720    Essential hypertension I10    Other mixed anxiety disorders F41.3    Bronchitis J40    Gout M10.9       Past Medical History:        Diagnosis Date    Allergic rhinitis     Cardiomyopathy ([facility] [address]     Depression     Gout     Hypertension        Past Surgical History:        Procedure Laterality Date    APPENDECTOMY      CYST REMOVAL      neck    HYSTERECTOMY      PARTIAL HYSTERECTOMY         Social History:    Social History     Tobacco Use    Smoking status: Current Every Day Smoker     Packs/day: 0.20     Years: 31.00     Pack years: 6.20    Smokeless tobacco: Never Used   Substance Use Topics    Alcohol use: Yes     Alcohol/week: 2.0 standard drinks     Types: 2 Glasses of wine per week     Comment: OCC                                Ready to quit: Not Answered  Counseling given: Not Answered      Vital Signs (Current):   Vitals:    11/05/21 0805   BP: 135/79   Pulse: 92   Resp: 16   Temp: 96.1 °F (35.6 °C)   TempSrc: Temporal   SpO2: 100%   Weight: 189 lb (85.7 kg)   Height: 5' 7\" (1.702 m)                                              BP Readings from Last 3 Encounters:   11/05/21 135/79   09/30/21 130/72   07/29/21 122/72       NPO Status:                                                                                 BMI:   Wt Readings from Last 3 Encounters:   11/05/21 189 lb (85.7 kg)   09/30/21 193 lb (87.5 kg)   07/29/21 196 lb (88.9 kg)     Body mass index is 29.6 kg/m².     CBC:   Lab Results   Component Value Date    WBC 9.1 07/29/2021    RBC 4.05 07/29/2021    HGB 12.2 07/29/2021    HCT 36.4 07/29/2021    MCV 89.8 07/29/2021    RDW 14.1 07/29/2021     07/29/2021       CMP:   Lab Results   Component Value Date     07/29/2021    K 4.2 07/29/2021     07/29/2021    CO2 22 07/29/2021    BUN 16 07/29/2021    CREATININE 1.0 07/29/2021    GFRAA >60 07/29/2021    GFRAA >60 05/05/2010    AGRATIO 1.3 07/29/2021    LABGLOM 58 07/29/2021    GLUCOSE 110 07/29/2021    PROT 7.5 07/29/2021    CALCIUM 9.2 07/29/2021    BILITOT <0.2 07/29/2021    ALKPHOS 94 07/29/2021    AST 12 07/29/2021    ALT 13 07/29/2021       POC Tests: No results for input(s): POCGLU, POCNA, POCK, POCCL, POCBUN, POCHEMO, POCHCT in the last 72 hours. Coags: No results found for: PROTIME, INR, APTT    HCG (If Applicable): No results found for: PREGTESTUR, PREGSERUM, HCG, HCGQUANT     ABGs: No results found for: PHART, PO2ART, KNT9DMP, AFA9KJO, BEART, J1VVXVGO     Type & Screen (If Applicable):  No results found for: LABABO, LABRH    Drug/Infectious Status (If Applicable):  No results found for: HIV, HEPCAB    COVID-19 Screening (If Applicable): No results found for: COVID19        Anesthesia Evaluation    Airway: Mallampati: II  TM distance: >3 FB   Neck ROM: full  Mouth opening: > = 3 FB Dental:          Pulmonary:   (+) shortness of breath:  asthma: exercise-induced asthma, current smoker                           Cardiovascular:  Exercise tolerance: good (>4 METS),   (+) hypertension:, CHF:,                   Neuro/Psych:      (-) seizures and CVA           GI/Hepatic/Renal:   (+) GERD: poorly controlled,           Endo/Other:        (-) diabetes mellitus               Abdominal:             Vascular: Other Findings:             Anesthesia Plan      MAC     ASA 3     (-npo MN  - \"I am on a pill because my heart is weak. \"      Echo 2021  Conclusions      Summary   Left ventricular cavity size is normal with normal left ventricular wall   thickness. Overall left ventricular systolic function appears mildly reduced. Ejection fraction is visually estimated to be 50%. There is mild diffuse hypokinesis. Grade I diastolic dysfunction with normal LV filling pressures. Mitral valve leaflets appear mildly thickened. Mild mitral annular calcification. Mild mitral regurgitation. Aortic valve appears sclerotic but opens adequately. Trivial tricuspid regurgitation. Trivial pulmonic regurgitation present.)  Induction: intravenous.       Anesthetic plan and risks discussed with patient. Plan discussed with CRNA.                 Blayne Betancourt MD   11/5/2021

## 2021-11-05 NOTE — PROCEDURES
[facility]  Colonoscopy Note    Patient: Matt Morataya  : 1965  Acct#:     Procedure: Colonoscopy with polypectomy (cold snare), endoclip x 2     Date:  2021    Surgeon:  Stephen Lou MD    Referring Physician:  Madelaine Agosto MD    Anesthesia: IV propofol, per anesthesia    EBL: <50 mL    Indications: This is a 49y.o. year old female who presents today with screening for colon cancer. Procedure: An informed consent was obtained from the patient after explanation of indications, benefits, possible risks and complications of the procedure. The patient was then taken to the endoscopy suite, placed in the left lateral decubitus position, and the above IV anesthesia was administered. A digital rectal examination was performed and revealed negative without mass, lesions or tenderness. The Olympus PCFQ-H190 video colonoscope was placed in the patient's rectum under digital direction and advanced to the cecum. The cecum was identified by characteristic anatomy and ballottment. The prep was adequate. Findings:  A 6 mm sessile polyp was found the descending colon was removed via cold snare polypectomy. 2 endoclips were placed for bleeding prophylaxis as the underlying polypectomy bed looked vascular. A 5 mm sessile polyp was found in the sigmoid colon and was removed via cold snare polypectomy. Moderate diverticulosis. The scope was then withdrawn into the rectum and retroflexed. The retroflexed view of the anal verge and rectum demonstrates no hemorrhoids. The scope was straightened, the colon was decompressed and the scope was withdrawn from the patient. The patient tolerated the procedure well and was taken to the PACU in good condition. Biopsies:  Yes      Impression:   1. A 6 mm sessile polyp was found the descending colon was removed via cold snare polypectomy.   2 endoclips were placed for bleeding prophylaxis as the underlying polypectomy bed looked vascular. 2. A 5 mm sessile polyp was found in the sigmoid colon and was removed via cold snare polypectomy. 3. Moderate diverticulosis. Recommendations:  1.  Await biopsy results    Elissa Ramos MD  [facility]

## 2021-11-05 NOTE — ANESTHESIA POSTPROCEDURE EVALUATION
Department of Anesthesiology  Postprocedure Note    Patient: Yaima Reed  MRN: [id number]  YOB: 1965  Date of evaluation: 11/5/2021  Time:  10:29 AM     Procedure Summary     Date: 11/05/21 Room / Location: [address] [place] [address] Mariaelena Regalado  / [facility]    Anesthesia Start: 2170 Anesthesia Stop: 0090    Procedure: COLONOSCOPY POLYPECTOMY SNARE/COLD BIOPSY Diagnosis:       Screening for colon cancer      (Screening for colon cancer [Z12.11])    Surgeons: Will Melgar MD Responsible Provider: Justin Ornelas MD    Anesthesia Type: MAC ASA Status: 3          Anesthesia Type: MAC    Twyla Phase I: Twyla Score: 10    Twyla Phase II:      Last vitals: Reviewed and per EMR flowsheets.        Anesthesia Post Evaluation    Patient location during evaluation: bedside  Patient participation: complete - patient participated  Level of consciousness: awake  Pain score: 0  Airway patency: patent  Nausea & Vomiting: no nausea and no vomiting  Complications: no  Cardiovascular status: hemodynamically stable  Respiratory status: acceptable  Hydration status: euvolemic

## 2021-11-05 NOTE — PROGRESS NOTES
Pt to Endo for colonoscopy. Pt has been vaccinated for Covid. Pt is alert; oriented X 4; speech clear; breathing easily on RA; c/o rectal/anal pain from prep stating 'sore' and is 5/10. Pt walks with steady gait without assist.  IV placed. Pt now to procedure area.

## 2021-11-05 NOTE — H&P
Gastroenterology Note                 Pre-operative History and Physical    Patient: Jermaine Rios  : 1965  CSN:     History Obtained From:   Patient or guardian. HISTORY OF PRESENT ILLNESS:    The patient is a 49 y.o. female here for screening colonoscopy. Past Medical History:    Past Medical History:   Diagnosis Date    Allergic rhinitis     Cardiomyopathy ([facility] [address]     Depression     Gout     Hypertension      Past Surgical History:    Past Surgical History:   Procedure Laterality Date    APPENDECTOMY      CYST REMOVAL      neck    HYSTERECTOMY      PARTIAL HYSTERECTOMY       Medications Prior to Admission:   No current facility-administered medications on file prior to encounter. Current Outpatient Medications on File Prior to Encounter   Medication Sig Dispense Refill    colchicine (COLCRYS) 0.6 MG tablet Take 1 tablet by mouth daily 1.2 mg at the first sign of flare, followed in 1 hour with a single dose of 0.6 mg max of 1.8 mg daily 30 tablet 3    albuterol sulfate HFA (VENTOLIN HFA) 108 (90 Base) MCG/ACT inhaler Inhale 2 puffs into the lungs 4 times daily as needed for Wheezing 3 Inhaler 1    losartan-hydroCHLOROthiazide (HYZAAR) 100-25 MG per tablet TAKE 1 TABLET BY MOUTH ONE TIME A DAY 90 tablet 3    cetirizine (ZYRTEC) 10 MG tablet Take 10 mg by mouth daily          Allergies:  Codeine and Lisinopril      Social History:   Social History     Tobacco Use    Smoking status: Current Every Day Smoker     Packs/day: 0.20     Years: 31.00     Pack years: 6.20     Types: Cigarettes    Smokeless tobacco: Never Used   Substance Use Topics    Alcohol use:  Yes     Alcohol/week: 2.0 standard drinks     Types: 2 Glasses of wine per week     Comment: OCC     Family History:   Family History   Problem Relation Age of Onset    Diabetes Mother     Rheum Arthritis Mother     Diabetes Father     Diabetes Other     High Blood Pressure Other     Other Other         CVA  Lupus Sister     Rheum Arthritis Sister     Diabetes Maternal Grandmother     Hypertension Maternal Grandmother     Diabetes Maternal Grandfather     Hypertension Maternal Grandfather     No Known Problems Paternal Grandmother     No Known Problems Paternal Grandfather     Heart Disease Neg Hx        PHYSICAL EXAM:      /79   Pulse 92   Temp 96.1 °F (35.6 °C) (Temporal)   Resp 16   Ht 5' 7\" (1.702 m)   Wt 189 lb (85.7 kg)   SpO2 100%   BMI 29.60 kg/m²  I        Heart:  RRR, normal s1s2    Lungs:  CTA and normal effort    Abdomen:   Soft, nt nd. ASSESSMENT AND PLAN:    1. Patient is a 49 y.o. female here for endoscopy with MAC sedation. 2.  Procedure options, risks and benefits reviewed with patient and/or guardian. They express understanding.     Quoc Petersen MD  [facility]

## 2021-11-05 NOTE — PROGRESS NOTES
Ambulatory Surgery/Procedure Discharge Note    Patient tolerated procedure well. Patient denies nausea, cramping or pain post procedure. Discharge instructions and education reviewed with patient and Fiance. Written instructions provided at discharge. Patient discharged ambulatory in wheelchair to car. Fiance to drive pt home. Vitals:    11/05/21 0950   BP:    Pulse: 86   Resp: 16   Temp:    SpO2:        No intake/output data recorded. Restroom use offered before discharge. Yes    Pain assessment:  none  Pain Level: 0        Patient discharged to home/self care.  Patient discharged via wheel chair by transporter to waiting family/S.O.       11/5/2021 1035 AM

## 2022-03-04 ENCOUNTER — OFFICE VISIT (OUTPATIENT)
Dept: CARDIOLOGY CLINIC | Age: 57
End: 2022-03-04
Payer: COMMERCIAL

## 2022-03-04 VITALS
HEIGHT: 67 IN | DIASTOLIC BLOOD PRESSURE: 80 MMHG | SYSTOLIC BLOOD PRESSURE: 122 MMHG | WEIGHT: 194.6 LBS | BODY MASS INDEX: 30.54 KG/M2 | OXYGEN SATURATION: 99 % | HEART RATE: 96 BPM

## 2022-03-04 DIAGNOSIS — I10 PRIMARY HYPERTENSION: ICD-10-CM

## 2022-03-04 DIAGNOSIS — F17.200 TOBACCO DEPENDENCE: ICD-10-CM

## 2022-03-04 DIAGNOSIS — E55.9 VITAMIN D DEFICIENCY: ICD-10-CM

## 2022-03-04 DIAGNOSIS — I42.8 OTHER CARDIOMYOPATHY (HCC): Primary | ICD-10-CM

## 2022-03-04 DIAGNOSIS — Z13.220 SCREENING FOR LIPID DISORDERS: ICD-10-CM

## 2022-03-04 PROCEDURE — 99214 OFFICE O/P EST MOD 30 MIN: CPT | Performed by: NURSE PRACTITIONER

## 2022-03-04 PROCEDURE — 93000 ELECTROCARDIOGRAM COMPLETE: CPT | Performed by: NURSE PRACTITIONER

## 2022-03-04 NOTE — PROGRESS NOTES
[facility]     Outpatient Follow Up Note    Lyndsey Madera is 56 y.o. female who presents today with a history of hypertension and cardiomyopathy    CHIEF COMPLAINT / HPI:  Follow Up secondary to cardiomyopathy and hypertension. Subjective:   She is here for her 6 month check up. Patient complaints of heart burn that appears when she lies down. It hurts really bad. When she sits up she burbs. Relieves it for a minute. Always occurs after she eats or drinks anything. No  other associated symptoms. No family history of cardiac diesase. She tried over the counter omeprazole which helped. Does not weigh at home. Weight staying staying the same. Smokes 1 pack of cigarettes a week. 2-3 cigarettes a day. Complaints of being tired. Vitamin D level low in the past. Ran out of her vitamin D supplements and did not refill them. Admits to snoring. Has a referral sent to pulmonary in the past and she did not follow through. Does not want to investigate this again. She denies significant chest pain. There is no SOB/DELUNA. The patient denies  . The patient is not experiencing palpitations or dizziness. These symptoms show no change since the last OV. With regard to medication therapy the patient has been compliant with prescribed regimen. They have tolerated therapy to date.      Past Medical History:   Diagnosis Date    Allergic rhinitis     Cardiomyopathy ([address]     Depression     Gout     Hypertension      Social History:    Social History     Tobacco Use   Smoking Status Current Every Day Smoker    Packs/day: 0.20    Years: 31.00    Pack years: 6.20    Types: Cigarettes   Smokeless Tobacco Never Used     Current Medications:  Current Outpatient Medications   Medication Sig Dispense Refill    colchicine (COLCRYS) 0.6 MG tablet Take 1 tablet by mouth daily 1.2 mg at the first sign of flare, followed in 1 hour with a single dose of 0.6 mg max of 1.8 mg daily 30 tablet 3    albuterol sulfate HFA (VENTOLIN HFA) 108 (90 Base) MCG/ACT inhaler Inhale 2 puffs into the lungs 4 times daily as needed for Wheezing 3 Inhaler 1    losartan-hydroCHLOROthiazide (HYZAAR) 100-25 MG per tablet TAKE 1 TABLET BY MOUTH ONE TIME A DAY 90 tablet 3    cetirizine (ZYRTEC) 10 MG tablet Take 10 mg by mouth daily       No current facility-administered medications for this visit. REVIEW OF SYSTEMS:    CONSTITUTIONAL: No major weight gain or loss, fatigue, weakness, night sweats or fever. HEENT: No new vision difficulties or ringing in the ears. RESPIRATORY: No new SOB, PND, orthopnea or cough. CARDIOVASCULAR: See HPI  GI: No nausea, vomiting, diarrhea, constipation, abdominal pain or changes in bowel habits. : No urinary frequency, urgency, incontinence hematuria or dysuria. SKIN: No cyanosis or skin lesions. MUSCULOSKELETAL: No new muscle or joint pain. NEUROLOGICAL: No syncope or TIA-like symptoms. PSYCHIATRIC: No anxiety, pain, insomnia or depression    Objective:   PHYSICAL EXAM:        Vitals:    03/04/22 1518 03/04/22 1525   BP: 120/80 122/80   Site: Left Upper Arm    Position: Sitting    Cuff Size: Large Adult    Pulse: 96    SpO2: 99%    Weight: 194 lb 9.6 oz (88.3 kg)    Height: 5' 7\" (1.702 m)        VITALS:  /80 (Site: Left Upper Arm, Position: Sitting, Cuff Size: Large Adult)   Pulse 96   Ht 5' 7\" (1.702 m)   Wt 194 lb 9.6 oz (88.3 kg)   SpO2 99%   BMI 30.48 kg/m²   CONSTITUTIONAL: Cooperative, no apparent distress, and appears well nourished / developed  NEUROLOGIC:  Awake and orientated to person, place and time. PSYCH: Calm affect. SKIN: Warm and dry. HEENT: Sclera non-icteric, normocephalic, neck supple, no elevation of JVP, normal carotid pulses with no bruits and thyroid normal size.   LUNGS:  No increased work of breathing and clear to auscultation, no crackles or wheezing  CARDIOVASCULAR:  Regular rate and rhythm with no murmurs, gallops, rubs, or abnormal heart sounds, normal PMI. The apical impulses not displaced  JVP less than 8 cm H2O  Heart tones are crisp and normal  Cervical veins are not engorged  The carotid upstroke is normal in amplitude and contour without delay or bruit  JVP is not elevated  ABDOMEN:  Normal bowel sounds, non-distended and non-tender to palpation  EXT: No edema, no calf tenderness. Pulses are present bilaterally. DATA:    Lab Results   Component Value Date    ALT 13 07/29/2021    AST 12 (L) 07/29/2021    ALKPHOS 94 07/29/2021    BILITOT <0.2 07/29/2021     Lab Results   Component Value Date    CREATININE 1.0 07/29/2021    BUN 16 07/29/2021     07/29/2021    K 4.2 07/29/2021     07/29/2021    CO2 22 07/29/2021       Lab Results   Component Value Date    WBC 9.1 07/29/2021    HGB 12.2 07/29/2021    HCT 36.4 07/29/2021    MCV 89.8 07/29/2021     07/29/2021     No components found for: CHLPL  No results found for: TRIG  Lab Results   Component Value Date    HDL 38 (L) 07/29/2021     Lab Results   Component Value Date    LDLCALC 126 (H) 07/29/2021     Lab Results   Component Value Date    LABVLDL 45 07/29/2021       Radiology Review:  Pertinent images / reports were reviewed as a part of this visit and reveals the following:    Last Echo: Jan '21  Summary   Left ventricular cavity size is normal with normal left ventricular wall   thickness. Overall left ventricular systolic function appears mildly reduced. Ejection fraction is visually estimated to be 50%. There is mild diffuse hypokinesis. Grade I diastolic dysfunction with normal LV filling pressures. Mitral valve leaflets appear mildly thickened. Mild mitral annular calcification. Mild mitral regurgitation. Aortic valve appears sclerotic but opens adequately. Trivial tricuspid regurgitation. Trivial pulmonic regurgitation present.       Last Stress Test: 6/12/2017   Summary    -Normal myocardial perfusion.    -LV function is moderately reduced with global hypokinesis and ejection    fraction of 38 %.       24 hour Holter Monitor 12/18  Sinus Rhythm. One run of Mobitz I        Assessment:      Diagnosis Orders   1.              2.     3. Cardiomyopathy, unspecified type ([address]   ~improved with last EF 50% '21  ~neg for decompensation   ECHO 5/26/17> EF 30-35% and 37% by 3D. RVSP 48 mmHg. ECHO 10/9/17> EF improved to 45%. ECHO  12/14/18> EF 45-50%. Hypertension  Controlled: 122/80 sitting R arm  ~grade I diastolic dysfunction      Tobacco dependence  ~suboptimal as continues to smoke   EKG 12 lead            cmp         I had the opportunity to review the clinical symptoms and presentation of Miesha Cadet. Plan:     1. EKG : sinus rhythm     Vitamin D level, CMP, Lipid  2. Try taking OTC pepcid or protonix for your heartburn   3. Follow up in 6-9 months     Overall the patient is stable from CV standpoint    I have addresed the patient's cardiac risk factors and adjusted pharmacologic treatment as needed. In addition, I have reinforced the need for patient directed risk factor modification. Further evaluation will be based upon the patient's clinical course and testing results. All questions and concerns were addressed to the patient Alternatives to my treatment were discussed. The patient is not currently smoking. The risks related to smoking were reviewed with the patient. Recommend maintaining a smoke-free lifestyle. Products available for smoking cessation were discussed in detail. Patient is not on a beta-blocker : neg MI  Patient is on an ace-i/ARB  Patient isnot on a statin: neg hx CAD    Dual Antiplatelet therapy has not been recommended / prescribed for this patient. Angiotension inhibitor/angiotension receptor blocker has been prescribed / recommended for congestive heart failure. Daily weight, low sodium diet were discussed.  Patient instructed to call the office with a weight gain: > 3 # over night or 5# in one week; swelling, SOB/orthopnea/PND    The patient verbalizes understanding not to stop medications without discussing with us. Discussed exercise: 30-60 minutes 7 days/week  Discussed EULALIO diet. Thank you for allowing to us to participate in the care of Zeina Vizcaino.     FERNANDO Crow  Seen/eval with Ham Gray RN, [address] [place] [address]    Documentation of today's visit sent to PCP

## 2022-03-14 ENCOUNTER — HOSPITAL ENCOUNTER (OUTPATIENT)
Age: 57
Discharge: HOME | End: 2022-03-14
Payer: COMMERCIAL

## 2022-03-14 DIAGNOSIS — Z13.220 SCREENING FOR LIPID DISORDERS: ICD-10-CM

## 2022-03-14 DIAGNOSIS — E55.9 VITAMIN D DEFICIENCY: ICD-10-CM

## 2022-03-14 LAB
A/G RATIO: 1.4 (ref 1.1–2.2)
ALBUMIN SERPL-MCNC: 4.3 G/DL (ref 3.4–5)
ALP BLD-CCNC: 83 U/L (ref 40–129)
ALT SERPL-CCNC: 18 U/L (ref 10–40)
ANION GAP SERPL CALCULATED.3IONS-SCNC: 14 MMOL/L (ref 3–16)
AST SERPL-CCNC: 13 U/L (ref 15–37)
BILIRUB SERPL-MCNC: 0.6 MG/DL (ref 0–1)
BUN BLDV-MCNC: 17 MG/DL (ref 7–20)
CALCIUM SERPL-MCNC: 9.5 MG/DL (ref 8.3–10.6)
CHLORIDE BLD-SCNC: 106 MMOL/L (ref 99–110)
CHOLESTEROL, FASTING: 223 MG/DL (ref 0–199)
CO2: 23 MMOL/L (ref 21–32)
CREAT SERPL-MCNC: 1 MG/DL (ref 0.6–1.1)
GFR AFRICAN AMERICAN: >60
GFR NON-AFRICAN AMERICAN: 58
GLUCOSE BLD-MCNC: 112 MG/DL (ref 70–99)
HDLC SERPL-MCNC: 48 MG/DL (ref 40–60)
LDL CHOLESTEROL CALCULATED: 139 MG/DL
POTASSIUM SERPL-SCNC: 4.5 MMOL/L (ref 3.5–5.1)
SODIUM BLD-SCNC: 143 MMOL/L (ref 136–145)
TOTAL PROTEIN: 7.4 G/DL (ref 6.4–8.2)
TRIGLYCERIDE, FASTING: 181 MG/DL (ref 0–150)
VITAMIN D 25-HYDROXY: 14.9 NG/ML
VLDLC SERPL CALC-MCNC: 36 MG/DL

## 2022-03-14 PROCEDURE — 80053 COMPREHEN METABOLIC PANEL: CPT

## 2022-03-14 PROCEDURE — 82306 VITAMIN D 25 HYDROXY: CPT

## 2022-03-14 PROCEDURE — 80061 LIPID PANEL: CPT

## 2022-03-14 PROCEDURE — 36415 COLL VENOUS BLD VENIPUNCTURE: CPT

## 2022-03-15 ENCOUNTER — TELEPHONE (OUTPATIENT)
Dept: FAMILY MEDICINE CLINIC | Age: 57
End: 2022-03-15

## 2022-03-15 RX ORDER — ERGOCALCIFEROL 1.25 MG/1
50000 CAPSULE ORAL WEEKLY
Qty: 4 CAPSULE | Refills: 5 | Status: SHIPPED | OUTPATIENT
Start: 2022-03-15 | End: 2022-05-12

## 2022-03-15 NOTE — TELEPHONE ENCOUNTER
Called patient - informed her that her vitamin D level was low. A prescription for Vitamin D 19877 units was sent to her pharmacy. She'll take it once a week.

## 2022-03-15 NOTE — TELEPHONE ENCOUNTER
----- Message from FERNANDO Portillo CNP sent at 3/15/2022 10:40 AM EDT -----  Please let patient know that I sent 18263 units vit d to pharmacy for her to take weekly.     Devon Cazares   ----- Message -----  From: Jessi Baltazar MA  Sent: 3/15/2022   8:49 AM EDT  To: FERNANDO Portillo CNP

## 2022-03-27 ENCOUNTER — HOSPITAL ENCOUNTER (EMERGENCY)
Age: 57
Discharge: HOME | End: 2022-03-27
Payer: COMMERCIAL

## 2022-03-27 VITALS
SYSTOLIC BLOOD PRESSURE: 135 MMHG | WEIGHT: 192 LBS | HEART RATE: 60 BPM | BODY MASS INDEX: 30.13 KG/M2 | TEMPERATURE: 97.5 F | RESPIRATION RATE: 15 BRPM | OXYGEN SATURATION: 99 % | HEIGHT: 67 IN | DIASTOLIC BLOOD PRESSURE: 74 MMHG

## 2022-03-27 DIAGNOSIS — L02.91 ABSCESS: Primary | ICD-10-CM

## 2022-03-27 PROCEDURE — 6370000000 HC RX 637 (ALT 250 FOR IP): Performed by: PHYSICIAN ASSISTANT

## 2022-03-27 PROCEDURE — 10060 I&D ABSCESS SIMPLE/SINGLE: CPT

## 2022-03-27 PROCEDURE — 99283 EMERGENCY DEPT VISIT LOW MDM: CPT

## 2022-03-27 PROCEDURE — 2500000003 HC RX 250 WO HCPCS

## 2022-03-27 RX ORDER — CEPHALEXIN 500 MG/1
500 CAPSULE ORAL 4 TIMES DAILY
Qty: 40 CAPSULE | Refills: 0 | Status: SHIPPED | OUTPATIENT
Start: 2022-03-27 | End: 2022-04-06

## 2022-03-27 RX ORDER — SULFAMETHOXAZOLE AND TRIMETHOPRIM 800; 160 MG/1; MG/1
1 TABLET ORAL 2 TIMES DAILY
Qty: 20 TABLET | Refills: 0 | Status: SHIPPED | OUTPATIENT
Start: 2022-03-27 | End: 2022-04-06

## 2022-03-27 RX ORDER — IBUPROFEN 600 MG/1
600 TABLET ORAL EVERY 6 HOURS PRN
Qty: 30 TABLET | Refills: 0 | Status: SHIPPED | OUTPATIENT
Start: 2022-03-27

## 2022-03-27 RX ORDER — OXYCODONE HYDROCHLORIDE AND ACETAMINOPHEN 5; 325 MG/1; MG/1
2 TABLET ORAL ONCE
Status: COMPLETED | OUTPATIENT
Start: 2022-03-27 | End: 2022-03-27

## 2022-03-27 RX ORDER — OXYCODONE HYDROCHLORIDE AND ACETAMINOPHEN 5; 325 MG/1; MG/1
1 TABLET ORAL EVERY 6 HOURS PRN
Qty: 3 TABLET | Refills: 0 | Status: SHIPPED | OUTPATIENT
Start: 2022-03-27 | End: 2022-03-28

## 2022-03-27 RX ADMIN — LIDOCAINE HYDROCHLORIDE 30 ML: 10; .005 INJECTION, SOLUTION EPIDURAL; INFILTRATION; INTRACAUDAL; PERINEURAL at 08:11

## 2022-03-27 RX ADMIN — OXYCODONE HYDROCHLORIDE AND ACETAMINOPHEN 2 TABLET: 5; 325 TABLET ORAL at 08:10

## 2022-03-27 ASSESSMENT — ENCOUNTER SYMPTOMS
DIARRHEA: 0
COLOR CHANGE: 0
ABDOMINAL PAIN: 0
WHEEZING: 0
SHORTNESS OF BREATH: 0
STRIDOR: 0
NAUSEA: 0
BACK PAIN: 0
COUGH: 0
CONSTIPATION: 0
VOMITING: 0
RECTAL PAIN: 0

## 2022-03-27 ASSESSMENT — PAIN SCALES - GENERAL
PAINLEVEL_OUTOF10: 10

## 2022-03-27 ASSESSMENT — PAIN - FUNCTIONAL ASSESSMENT: PAIN_FUNCTIONAL_ASSESSMENT: 0-10

## 2022-03-27 ASSESSMENT — PAIN DESCRIPTION - PAIN TYPE: TYPE: ACUTE PAIN

## 2022-03-27 ASSESSMENT — PAIN DESCRIPTION - ORIENTATION: ORIENTATION: RIGHT

## 2022-03-27 NOTE — ED PROVIDER NOTES
[facility]        Pt Name: Jenae Gillespie  MRN: [id number]  [place] 1965  Date of evaluation: 3/27/2022  Provider: Sohan Hernandez PA-C  PCP: FERNANDO Lindo CNP  Note Started: 8:13 AM EDT       ULI. I have evaluated this patient. My supervising physician was available for consultation. CHIEF COMPLAINT       Chief Complaint   Patient presents with    Abscess     rt upper thigh since thursday       HISTORY OF PRESENT ILLNESS   (Location, Timing/Onset, Context/Setting, Quality, Duration, Modifying Factors, Severity, Associated Signs and Symptoms)  Note limiting factors. Chief Complaint: Right medial thigh abscess    Jenae Gillespie is a 56 y.o. female who presents to the emergency department complaining of right proximal medial thigh abscess starting on Thursday. She has had numerous abscesses on her thighs in the past.  They typically drain spontaneously resolve on their own with warm compresses. However, the site has gotten larger despite warm compresses. She is not diabetic. Denies vaginal or rectal involvement. Denies any pain in the rectum or perineum. Denies documented fever or chills. Patient is tearful and anxious about incision and drainage. She rates her pain to be a 10 out of 10 on pain scale. Nursing Notes were all reviewed and agreed with or any disagreements were addressed in the HPI. REVIEW OF SYSTEMS    (2-9 systems for level 4, 10 or more for level 5)     Review of Systems   Constitutional: Negative for chills and fever. HENT: Negative. Eyes: Negative for visual disturbance. Respiratory: Negative for cough, shortness of breath, wheezing and stridor. Cardiovascular: Negative for chest pain, palpitations and leg swelling. Gastrointestinal: Negative for abdominal pain, constipation, diarrhea, nausea, rectal pain and vomiting. Genitourinary: Negative. Musculoskeletal: Negative for back pain, neck pain and neck stiffness. Skin: Positive for wound. Negative for color change, pallor and rash. Neurological: Negative. Psychiatric/Behavioral: Negative for confusion. The patient is nervous/anxious. All other systems reviewed and are negative. Positives and Pertinent negatives as per HPI. Except as noted above in the ROS, all other systems were reviewed and negative.        PAST MEDICAL HISTORY     Past Medical History:   Diagnosis Date    Allergic rhinitis     Cardiomyopathy ([address]     Depression     Gout     Hypertension          SURGICAL HISTORY     Past Surgical History:   Procedure Laterality Date    APPENDECTOMY      COLONOSCOPY  11/5/2021    COLONOSCOPY POLYPECTOMY SNARE/COLD BIOPSY performed by Jaiden Moore MD at [address] [place] [address]       Previous Medications    ALBUTEROL SULFATE HFA (VENTOLIN HFA) 108 (90 BASE) MCG/ACT INHALER    Inhale 2 puffs into the lungs 4 times daily as needed for Wheezing    CETIRIZINE (ZYRTEC) 10 MG TABLET    Take 10 mg by mouth daily As needed    COLCHICINE (COLCRYS) 0.6 MG TABLET    Take 1 tablet by mouth daily 1.2 mg at the first sign of flare, followed in 1 hour with a single dose of 0.6 mg max of 1.8 mg daily    LOSARTAN-HYDROCHLOROTHIAZIDE (HYZAAR) 100-25 MG PER TABLET    TAKE 1 TABLET BY MOUTH ONE TIME A DAY    VITAMIN D (ERGOCALCIFEROL) 1.25 MG (85802 UT) CAPS CAPSULE    Take 1 capsule by mouth once a week         ALLERGIES     Codeine and Lisinopril    FAMILYHISTORY       Family History   Problem Relation Age of Onset    Diabetes Mother    [place] Rheum Arthritis Mother     Diabetes Father     Diabetes Other     High Blood Pressure Other     Other Other         CVA    Lupus Sister     Rheum Arthritis Sister     Diabetes Maternal Grandmother     Hypertension Maternal Grandmother     Diabetes Maternal Grandfather     Hypertension Maternal Grandfather     No Known Problems Paternal Grandmother     No Known Problems Paternal Grandfather     Heart Disease Neg Hx           SOCIAL HISTORY       Social History     Tobacco Use    Smoking status: Current Every Day Smoker     Packs/day: 0.20     Years: 31.00     Pack years: 6.20     Types: Cigarettes    Smokeless tobacco: Never Used   Vaping Use    Vaping Use: Never used   Substance Use Topics    Alcohol use: Yes     Alcohol/week: 2.0 standard drinks     Types: 2 Glasses of wine per week     Comment: OCC    Drug use: No       SCREENINGS    [place] Coma Scale  Eye Opening: Spontaneous  Best Verbal Response: Oriented  Best Motor Response: Obeys commands  [place] Coma Scale Score: 15        PHYSICAL EXAM    (up to 7 for level 4, 8 or more for level 5)     ED Triage Vitals   BP Temp Temp src Pulse Resp SpO2 Height Weight   03/27/22 0801 03/27/22 0813 -- 03/27/22 0801 03/27/22 0801 03/27/22 0801 03/27/22 0801 03/27/22 0801   135/74 97.5 °F (36.4 °C)  60 15 99 % 5' 7\" (1.702 m) 192 lb (87.1 kg)       Physical Exam  Vitals and nursing note reviewed. Constitutional:       Appearance: Normal appearance. She is well-developed. She is obese. She is not toxic-appearing or diaphoretic. HENT:      Head: Normocephalic and atraumatic. Right Ear: External ear normal.      Left Ear: External ear normal.      Nose: Nose normal.      Mouth/Throat:      Mouth: Mucous membranes are moist.      Pharynx: Oropharynx is clear. Eyes:      General: No scleral icterus. Right eye: No discharge. Left eye: No discharge. Cardiovascular:      Rate and Rhythm: Normal rate. Pulmonary:      Effort: Pulmonary effort is normal.      Breath sounds: Normal breath sounds. Abdominal:      General: Bowel sounds are normal.      Palpations: Abdomen is soft. Tenderness: There is no abdominal tenderness.    Genitourinary:      Musculoskeletal:         General: Normal range of motion. Cervical back: Normal range of motion. Skin:     General: Skin is warm and dry. Coloration: Skin is not jaundiced or pale. Findings: No bruising, erythema, lesion or rash. Neurological:      Mental Status: She is alert and oriented to person, place, and time. Mental status is at baseline. Psychiatric:         Behavior: Behavior normal.         DIAGNOSTIC RESULTS   LABS:    Labs Reviewed - No data to display    When ordered only abnormal lab results are displayed. All other labs were within normal range or not returned as of this dictation. EKG: When ordered, EKG's are interpreted by the Emergency Department Physician in the absence of a cardiologist.  Please see their note for interpretation of EKG. RADIOLOGY:   Non-plain film images such as CT, Ultrasound and MRI are read by the radiologist. Plain radiographic images are visualized and preliminarily interpreted by the ED Provider with the below findings:        Interpretation per the Radiologist below, if available at the time of this note:    No orders to display     No results found. PROCEDURES   Incision and Drainage: The Incision and Drainage procedure was explained to the patient and I receive verbal consent from them. This right medial proximal thigh abscess was prepped with full strength Betadine, anesthetized with 1% lidocaine with epinephrine in a field block and under the abscess. The area was prepped again and draped. The area of most fluctuance was identified, and a number 11 blade was used to make a 2 cm linear-like incision through the skin and into the subcutaneous tissue, no fascia was penetrated. A blunt probe was used to deloculated the wound and a moderate amount of purulent material was expressed. The wound was then packed with 0.25-inch iodoform gauze leaving 1-2\" exposed. The patient experienced some pain but generally tolerated the procedure quite well.   4\" x 4\" gauzes were taped in place Emergency Department  [facility]  [phone]    If symptoms worsen      DISCHARGE MEDICATIONS:  New Prescriptions    CEPHALEXIN (KEFLEX) 500 MG CAPSULE    Take 1 capsule by mouth 4 times daily for 10 days    IBUPROFEN (ADVIL;MOTRIN) 600 MG TABLET    Take 1 tablet by mouth every 6 hours as needed for Pain    OXYCODONE-ACETAMINOPHEN (PERCOCET) 5-325 MG PER TABLET    Take 1 tablet by mouth every 6 hours as needed for Pain for up to 1 day. Intended supply: 3 days.  Take lowest dose possible to manage pain    SULFAMETHOXAZOLE-TRIMETHOPRIM (BACTRIM DS) 800-160 MG PER TABLET    Take 1 tablet by mouth 2 times daily for 10 days       DISCONTINUED MEDICATIONS:  Discontinued Medications    No medications on file              (Please note that portions of this note were completed with a voice recognition program.  Efforts were made to edit the dictations but occasionally words are mis-transcribed.)    Stephenie Taveras PA-C (electronically signed)           Stephenie Taveras PA-C  03/27/22 8318

## 2022-03-30 ENCOUNTER — TELEPHONE (OUTPATIENT)
Dept: FAMILY MEDICINE CLINIC | Age: 57
End: 2022-03-30

## 2022-03-30 RX ORDER — FLUCONAZOLE 150 MG/1
150 TABLET ORAL ONCE
Qty: 1 TABLET | Refills: 0 | Status: SHIPPED | OUTPATIENT
Start: 2022-03-30 | End: 2022-03-30

## 2022-03-30 NOTE — TELEPHONE ENCOUNTER
----- Message from Janet Seo sent at 3/30/2022  8:08 AM EDT -----  Subject: Message to Provider    Pt said she has a yeast infection needs to get some medication called in to [facility] [address]. Pt had this since Monday and needs some medication for it.  ---------------------------------------------------------------------------    OK to leave message on voicemail  Preferred Call Back Phone Number?  [phone]

## 2022-04-11 RX ORDER — LOSARTAN POTASSIUM AND HYDROCHLOROTHIAZIDE 25; 100 MG/1; MG/1
TABLET ORAL
Qty: 90 TABLET | Refills: 1 | Status: SHIPPED | OUTPATIENT
Start: 2022-04-11

## 2022-04-15 ENCOUNTER — HOSPITAL ENCOUNTER (INPATIENT)
Age: 57
LOS: 1 days | Discharge: HOME | DRG: 603 | End: 2022-04-16
Attending: SURGERY | Admitting: SURGERY
Payer: COMMERCIAL

## 2022-04-15 ENCOUNTER — ANESTHESIA (OUTPATIENT)
Dept: OPERATING ROOM | Age: 57
DRG: 603 | End: 2022-04-15
Payer: COMMERCIAL

## 2022-04-15 ENCOUNTER — OFFICE VISIT (OUTPATIENT)
Dept: FAMILY MEDICINE CLINIC | Age: 57
End: 2022-04-15
Payer: COMMERCIAL

## 2022-04-15 ENCOUNTER — ANESTHESIA EVENT (OUTPATIENT)
Dept: OPERATING ROOM | Age: 57
DRG: 603 | End: 2022-04-15
Payer: COMMERCIAL

## 2022-04-15 ENCOUNTER — OFFICE VISIT (OUTPATIENT)
Dept: SURGERY | Age: 57
End: 2022-04-15

## 2022-04-15 ENCOUNTER — TELEPHONE (OUTPATIENT)
Dept: FAMILY MEDICINE CLINIC | Age: 57
End: 2022-04-15

## 2022-04-15 VITALS
SYSTOLIC BLOOD PRESSURE: 126 MMHG | WEIGHT: 193 LBS | BODY MASS INDEX: 30.23 KG/M2 | DIASTOLIC BLOOD PRESSURE: 80 MMHG | TEMPERATURE: 96.8 F | OXYGEN SATURATION: 94 % | HEART RATE: 123 BPM

## 2022-04-15 DIAGNOSIS — L02.214 ABSCESS OF RIGHT GROIN: Primary | ICD-10-CM

## 2022-04-15 DIAGNOSIS — R73.03 PRE-DIABETES: Primary | ICD-10-CM

## 2022-04-15 DIAGNOSIS — G89.18 POST-OP PAIN: Primary | ICD-10-CM

## 2022-04-15 DIAGNOSIS — L02.91 ABSCESS: ICD-10-CM

## 2022-04-15 PROBLEM — L02.415 ABSCESS OF RIGHT THIGH: Status: ACTIVE | Noted: 2022-04-15

## 2022-04-15 LAB
HBA1C MFR BLD: 6.7 %
HCG QUALITATIVE: NEGATIVE

## 2022-04-15 PROCEDURE — 6360000002 HC RX W HCPCS: Performed by: SURGERY

## 2022-04-15 PROCEDURE — 3600000012 HC SURGERY LEVEL 2 ADDTL 15MIN: Performed by: SURGERY

## 2022-04-15 PROCEDURE — 2580000003 HC RX 258: Performed by: SURGERY

## 2022-04-15 PROCEDURE — 7100000000 HC PACU RECOVERY - FIRST 15 MIN: Performed by: SURGERY

## 2022-04-15 PROCEDURE — 83036 HEMOGLOBIN GLYCOSYLATED A1C: CPT | Performed by: NURSE PRACTITIONER

## 2022-04-15 PROCEDURE — 84703 CHORIONIC GONADOTROPIN ASSAY: CPT

## 2022-04-15 PROCEDURE — 0H9HXZZ DRAINAGE OF RIGHT UPPER LEG SKIN, EXTERNAL APPROACH: ICD-10-PCS | Performed by: SURGERY

## 2022-04-15 PROCEDURE — 6360000002 HC RX W HCPCS: Performed by: FAMILY MEDICINE

## 2022-04-15 PROCEDURE — 3600000002 HC SURGERY LEVEL 2 BASE: Performed by: SURGERY

## 2022-04-15 PROCEDURE — 3700000001 HC ADD 15 MINUTES (ANESTHESIA): Performed by: SURGERY

## 2022-04-15 PROCEDURE — 36415 COLL VENOUS BLD VENIPUNCTURE: CPT

## 2022-04-15 PROCEDURE — 3700000000 HC ANESTHESIA ATTENDED CARE: Performed by: SURGERY

## 2022-04-15 PROCEDURE — 1200000000 HC SEMI PRIVATE

## 2022-04-15 PROCEDURE — 99999 PR OFFICE/OUTPT VISIT,PROCEDURE ONLY: CPT | Performed by: SURGERY

## 2022-04-15 PROCEDURE — 99214 OFFICE O/P EST MOD 30 MIN: CPT | Performed by: NURSE PRACTITIONER

## 2022-04-15 PROCEDURE — 7100000001 HC PACU RECOVERY - ADDTL 15 MIN: Performed by: SURGERY

## 2022-04-15 PROCEDURE — 2709999900 HC NON-CHARGEABLE SUPPLY: Performed by: SURGERY

## 2022-04-15 PROCEDURE — 94150 VITAL CAPACITY TEST: CPT

## 2022-04-15 PROCEDURE — 10061 I&D ABSCESS COMP/MULTIPLE: CPT | Performed by: SURGERY

## 2022-04-15 RX ORDER — LABETALOL HYDROCHLORIDE 5 MG/ML
10 INJECTION, SOLUTION INTRAVENOUS
Status: DISCONTINUED | OUTPATIENT
Start: 2022-04-15 | End: 2022-04-16

## 2022-04-15 RX ORDER — SODIUM CHLORIDE 0.9 % (FLUSH) 0.9 %
5-40 SYRINGE (ML) INJECTION PRN
Status: DISCONTINUED | OUTPATIENT
Start: 2022-04-15 | End: 2022-04-15 | Stop reason: HOSPADM

## 2022-04-15 RX ORDER — SODIUM CHLORIDE 0.9 % (FLUSH) 0.9 %
10 SYRINGE (ML) INJECTION PRN
Status: DISCONTINUED | OUTPATIENT
Start: 2022-04-15 | End: 2022-04-16 | Stop reason: HOSPADM

## 2022-04-15 RX ORDER — SODIUM CHLORIDE, SODIUM LACTATE, POTASSIUM CHLORIDE, CALCIUM CHLORIDE 600; 310; 30; 20 MG/100ML; MG/100ML; MG/100ML; MG/100ML
INJECTION, SOLUTION INTRAVENOUS CONTINUOUS
Status: DISCONTINUED | OUTPATIENT
Start: 2022-04-15 | End: 2022-04-16

## 2022-04-15 RX ORDER — OXYCODONE HYDROCHLORIDE 5 MG/1
10 TABLET ORAL PRN
Status: ACTIVE | OUTPATIENT
Start: 2022-04-15 | End: 2022-04-15

## 2022-04-15 RX ORDER — ONDANSETRON 2 MG/ML
4 INJECTION INTRAMUSCULAR; INTRAVENOUS
Status: DISCONTINUED | OUTPATIENT
Start: 2022-04-15 | End: 2022-04-15 | Stop reason: HOSPADM

## 2022-04-15 RX ORDER — ONDANSETRON 2 MG/ML
4 INJECTION INTRAMUSCULAR; INTRAVENOUS
Status: ACTIVE | OUTPATIENT
Start: 2022-04-15 | End: 2022-04-15

## 2022-04-15 RX ORDER — LABETALOL HYDROCHLORIDE 5 MG/ML
10 INJECTION, SOLUTION INTRAVENOUS
Status: DISCONTINUED | OUTPATIENT
Start: 2022-04-15 | End: 2022-04-15 | Stop reason: HOSPADM

## 2022-04-15 RX ORDER — MEPERIDINE HYDROCHLORIDE 25 MG/ML
12.5 INJECTION INTRAMUSCULAR; INTRAVENOUS; SUBCUTANEOUS EVERY 5 MIN PRN
Status: DISCONTINUED | OUTPATIENT
Start: 2022-04-15 | End: 2022-04-15 | Stop reason: HOSPADM

## 2022-04-15 RX ORDER — ONDANSETRON 2 MG/ML
4 INJECTION INTRAMUSCULAR; INTRAVENOUS EVERY 6 HOURS PRN
Status: DISCONTINUED | OUTPATIENT
Start: 2022-04-15 | End: 2022-04-16 | Stop reason: HOSPADM

## 2022-04-15 RX ORDER — SODIUM CHLORIDE 9 MG/ML
INJECTION, SOLUTION INTRAVENOUS CONTINUOUS
Status: DISCONTINUED | OUTPATIENT
Start: 2022-04-16 | End: 2022-04-16

## 2022-04-15 RX ORDER — LORAZEPAM 2 MG/ML
0.5 INJECTION INTRAMUSCULAR
Status: DISCONTINUED | OUTPATIENT
Start: 2022-04-15 | End: 2022-04-15 | Stop reason: HOSPADM

## 2022-04-15 RX ORDER — ONDANSETRON 4 MG/1
4 TABLET, ORALLY DISINTEGRATING ORAL EVERY 8 HOURS PRN
Status: DISCONTINUED | OUTPATIENT
Start: 2022-04-15 | End: 2022-04-16 | Stop reason: HOSPADM

## 2022-04-15 RX ORDER — SODIUM CHLORIDE 9 MG/ML
25 INJECTION, SOLUTION INTRAVENOUS PRN
Status: DISCONTINUED | OUTPATIENT
Start: 2022-04-15 | End: 2022-04-16

## 2022-04-15 RX ORDER — SODIUM CHLORIDE 0.9 % (FLUSH) 0.9 %
10 SYRINGE (ML) INJECTION EVERY 12 HOURS SCHEDULED
Status: DISCONTINUED | OUTPATIENT
Start: 2022-04-15 | End: 2022-04-16 | Stop reason: HOSPADM

## 2022-04-15 RX ORDER — LORAZEPAM 2 MG/ML
0.5 INJECTION INTRAMUSCULAR
Status: ACTIVE | OUTPATIENT
Start: 2022-04-15 | End: 2022-04-15

## 2022-04-15 RX ORDER — FENTANYL CITRATE 50 UG/ML
25 INJECTION, SOLUTION INTRAMUSCULAR; INTRAVENOUS EVERY 5 MIN PRN
Status: DISCONTINUED | OUTPATIENT
Start: 2022-04-15 | End: 2022-04-15 | Stop reason: HOSPADM

## 2022-04-15 RX ORDER — FENTANYL CITRATE 50 UG/ML
25 INJECTION, SOLUTION INTRAMUSCULAR; INTRAVENOUS ONCE
Status: DISCONTINUED | OUTPATIENT
Start: 2022-04-15 | End: 2022-04-15 | Stop reason: HOSPADM

## 2022-04-15 RX ORDER — SODIUM CHLORIDE 9 MG/ML
INJECTION, SOLUTION INTRAVENOUS PRN
Status: DISCONTINUED | OUTPATIENT
Start: 2022-04-15 | End: 2022-04-16 | Stop reason: HOSPADM

## 2022-04-15 RX ORDER — FENTANYL CITRATE 50 UG/ML
25 INJECTION, SOLUTION INTRAMUSCULAR; INTRAVENOUS EVERY 5 MIN PRN
Status: DISCONTINUED | OUTPATIENT
Start: 2022-04-15 | End: 2022-04-16

## 2022-04-15 RX ORDER — FENTANYL CITRATE 50 UG/ML
100 INJECTION, SOLUTION INTRAMUSCULAR; INTRAVENOUS ONCE
Status: DISCONTINUED | OUTPATIENT
Start: 2022-04-15 | End: 2022-04-15 | Stop reason: HOSPADM

## 2022-04-15 RX ORDER — SODIUM CHLORIDE 9 MG/ML
25 INJECTION, SOLUTION INTRAVENOUS PRN
Status: DISCONTINUED | OUTPATIENT
Start: 2022-04-15 | End: 2022-04-15 | Stop reason: HOSPADM

## 2022-04-15 RX ORDER — OXYCODONE HYDROCHLORIDE 5 MG/1
10 TABLET ORAL PRN
Status: DISCONTINUED | OUTPATIENT
Start: 2022-04-15 | End: 2022-04-15 | Stop reason: HOSPADM

## 2022-04-15 RX ORDER — SODIUM CHLORIDE 9 MG/ML
INJECTION, SOLUTION INTRAVENOUS CONTINUOUS
Status: DISCONTINUED | OUTPATIENT
Start: 2022-04-15 | End: 2022-04-16

## 2022-04-15 RX ORDER — DIPHENHYDRAMINE HYDROCHLORIDE 50 MG/ML
12.5 INJECTION INTRAMUSCULAR; INTRAVENOUS
Status: DISCONTINUED | OUTPATIENT
Start: 2022-04-15 | End: 2022-04-15 | Stop reason: HOSPADM

## 2022-04-15 RX ORDER — SODIUM CHLORIDE 0.9 % (FLUSH) 0.9 %
5-40 SYRINGE (ML) INJECTION PRN
Status: DISCONTINUED | OUTPATIENT
Start: 2022-04-15 | End: 2022-04-16

## 2022-04-15 RX ORDER — OXYCODONE HYDROCHLORIDE 5 MG/1
5 TABLET ORAL PRN
Status: ACTIVE | OUTPATIENT
Start: 2022-04-15 | End: 2022-04-15

## 2022-04-15 RX ORDER — SODIUM CHLORIDE 0.9 % (FLUSH) 0.9 %
5-40 SYRINGE (ML) INJECTION EVERY 12 HOURS SCHEDULED
Status: DISCONTINUED | OUTPATIENT
Start: 2022-04-16 | End: 2022-04-16

## 2022-04-15 RX ORDER — MEPERIDINE HYDROCHLORIDE 25 MG/ML
12.5 INJECTION INTRAMUSCULAR; INTRAVENOUS; SUBCUTANEOUS EVERY 5 MIN PRN
Status: DISCONTINUED | OUTPATIENT
Start: 2022-04-15 | End: 2022-04-16

## 2022-04-15 RX ORDER — SODIUM CHLORIDE 0.9 % (FLUSH) 0.9 %
5-40 SYRINGE (ML) INJECTION EVERY 12 HOURS SCHEDULED
Status: DISCONTINUED | OUTPATIENT
Start: 2022-04-15 | End: 2022-04-15 | Stop reason: HOSPADM

## 2022-04-15 RX ORDER — ALBUTEROL SULFATE 2.5 MG/3ML
2.5 SOLUTION RESPIRATORY (INHALATION) EVERY 4 HOURS PRN
Status: DISCONTINUED | OUTPATIENT
Start: 2022-04-15 | End: 2022-04-16 | Stop reason: HOSPADM

## 2022-04-15 RX ORDER — OXYCODONE HYDROCHLORIDE 5 MG/1
5 TABLET ORAL PRN
Status: DISCONTINUED | OUTPATIENT
Start: 2022-04-15 | End: 2022-04-15 | Stop reason: HOSPADM

## 2022-04-15 RX ORDER — OXYCODONE HYDROCHLORIDE 5 MG/1
10 TABLET ORAL EVERY 4 HOURS PRN
Status: DISCONTINUED | OUTPATIENT
Start: 2022-04-15 | End: 2022-04-16 | Stop reason: HOSPADM

## 2022-04-15 RX ORDER — DIPHENHYDRAMINE HYDROCHLORIDE 50 MG/ML
12.5 INJECTION INTRAMUSCULAR; INTRAVENOUS
Status: ACTIVE | OUTPATIENT
Start: 2022-04-15 | End: 2022-04-15

## 2022-04-15 RX ORDER — OXYCODONE HYDROCHLORIDE 5 MG/1
5 TABLET ORAL EVERY 4 HOURS PRN
Status: DISCONTINUED | OUTPATIENT
Start: 2022-04-15 | End: 2022-04-16 | Stop reason: HOSPADM

## 2022-04-15 RX ADMIN — MIDAZOLAM HYDROCHLORIDE 2 MG: 2 INJECTION, SOLUTION INTRAMUSCULAR; INTRAVENOUS at 23:57

## 2022-04-15 RX ADMIN — HYDROMORPHONE HYDROCHLORIDE 0.5 MG: 1 INJECTION, SOLUTION INTRAMUSCULAR; INTRAVENOUS; SUBCUTANEOUS at 19:18

## 2022-04-15 RX ADMIN — SODIUM CHLORIDE, POTASSIUM CHLORIDE, SODIUM LACTATE AND CALCIUM CHLORIDE: 600; 310; 30; 20 INJECTION, SOLUTION INTRAVENOUS at 21:15

## 2022-04-15 RX ADMIN — SODIUM CHLORIDE, POTASSIUM CHLORIDE, SODIUM LACTATE AND CALCIUM CHLORIDE: 600; 310; 30; 20 INJECTION, SOLUTION INTRAVENOUS at 13:45

## 2022-04-15 RX ADMIN — KETAMINE HYDROCHLORIDE 20 MG: 50 INJECTION, SOLUTION INTRAMUSCULAR; INTRAVENOUS at 23:58

## 2022-04-15 RX ADMIN — FENTANYL CITRATE 25 MCG: 50 INJECTION, SOLUTION INTRAMUSCULAR; INTRAVENOUS at 14:47

## 2022-04-15 RX ADMIN — FENTANYL CITRATE 25 MCG: 50 INJECTION, SOLUTION INTRAMUSCULAR; INTRAVENOUS at 14:59

## 2022-04-15 RX ADMIN — FENTANYL CITRATE 100 MCG: 50 INJECTION, SOLUTION INTRAMUSCULAR; INTRAVENOUS at 23:58

## 2022-04-15 RX ADMIN — SODIUM CHLORIDE, POTASSIUM CHLORIDE, SODIUM LACTATE AND CALCIUM CHLORIDE: 600; 310; 30; 20 INJECTION, SOLUTION INTRAVENOUS at 17:53

## 2022-04-15 ASSESSMENT — PAIN DESCRIPTION - DESCRIPTORS
DESCRIPTORS: DISCOMFORT
DESCRIPTORS: DISCOMFORT
DESCRIPTORS: DISCOMFORT;CONSTANT
DESCRIPTORS: CONSTANT;DISCOMFORT
DESCRIPTORS: ACHING;BURNING;DISCOMFORT;NAGGING

## 2022-04-15 ASSESSMENT — PAIN DESCRIPTION - ORIENTATION
ORIENTATION: RIGHT

## 2022-04-15 ASSESSMENT — ENCOUNTER SYMPTOMS
EYE REDNESS: 0
COLOR CHANGE: 0
NAUSEA: 0
VOMITING: 0
BLOOD IN STOOL: 0
RHINORRHEA: 0
EYE ITCHING: 0
WHEEZING: 0
SHORTNESS OF BREATH: 1
ABDOMINAL PAIN: 0
BACK PAIN: 0
COUGH: 0
DIARRHEA: 0
SORE THROAT: 0
SHORTNESS OF BREATH: 0
SINUS PRESSURE: 0
CHEST TIGHTNESS: 0
CONSTIPATION: 0
ROS SKIN COMMENTS: ABSCESS

## 2022-04-15 ASSESSMENT — PAIN DESCRIPTION - PAIN TYPE
TYPE: CHRONIC PAIN

## 2022-04-15 ASSESSMENT — PAIN SCALES - GENERAL
PAINLEVEL_OUTOF10: 4
PAINLEVEL_OUTOF10: 5
PAINLEVEL_OUTOF10: 8
PAINLEVEL_OUTOF10: 4
PAINLEVEL_OUTOF10: 4
PAINLEVEL_OUTOF10: 5
PAINLEVEL_OUTOF10: 4
PAINLEVEL_OUTOF10: 7

## 2022-04-15 ASSESSMENT — PAIN DESCRIPTION - LOCATION
LOCATION: LEG

## 2022-04-15 ASSESSMENT — PAIN - FUNCTIONAL ASSESSMENT: PAIN_FUNCTIONAL_ASSESSMENT: 0-10

## 2022-04-15 ASSESSMENT — PULMONARY FUNCTION TESTS
PIF_VALUE: 1
PIF_VALUE: 1

## 2022-04-15 ASSESSMENT — LIFESTYLE VARIABLES: SMOKING_STATUS: 1

## 2022-04-15 NOTE — TELEPHONE ENCOUNTER
----- Message from Emily Levy sent at 4/15/2022  7:08 AM EDT -----  Subject: Referral Request    QUESTIONS   Reason for referral request? Pt would like a referral to a general surgeon   for an abscess that was lanced about a month ago and is back and larger   and very painful. This is located in the inner thigh going up to her groin   area. Would like a call back   Has the physician seen you for this condition before? No   Preferred Specialist (if applicable)? Do you already have an appointment scheduled? No  Additional Information for Provider?   ---------------------------------------------------------------------------  --------------  CALL BACK INFO  What is the best way for the office to contact you? OK to leave message on   voicemail  Preferred Call Back Phone Number? [phone]  ---------------------------------------------------------------------------  --------------  SCRIPT ANSWERS  Relationship to Patient?  Self

## 2022-04-15 NOTE — H&P
GEN SURG/COLORECTAL H&P/CONSULT NOTE    A/P:  New problem(s): Right thigh and groin abscess  Established problem(s): None  Additional workup/treatment planned: Operative incision and drainage, antibiotics  Risk of complications/morbidity: Moderate    I saw her in the office today and recommended we proceed with incision and drainage. Due to the intense amount of pain, we will plan for this in the operating room. Discussed the plan for surgery, including risks. Discussed possible need for further intervention such as reoperations, additional debridements, and potential plastic surgery reconstruction.    ---------------------------------------------------------------------------------------------------------------------    CC/Reason for visit: Right thigh/groin abscess    HPI - recently seen at [facility] emergency department on 3/27/2022 for right groin abscess. She underwent incision and drainage with a stab technique. Initially she felt better but over the past couple days she has been having increasing excruciating pain.   She saw her primary care physician, Kylah Lou NP, who sent her straight over for an office exam.      Past Medical History:   Diagnosis Date    Allergic rhinitis     Cardiomyopathy ([address]     Depression     Gout     Hypertension      Past Surgical History:   Procedure Laterality Date    APPENDECTOMY      COLONOSCOPY  11/5/2021    COLONOSCOPY POLYPECTOMY SNARE/COLD BIOPSY performed by Ruddy Hopkins MD at [address]         Current Facility-Administered Medications:     lactated ringers infusion, , IntraVENous, Continuous, Tiffanie Horner MD, Last Rate: 100 mL/hr at 04/15/22 1345, New Bag at 04/15/22 1345    ceFAZolin (ANCEF) 2000 mg in dextrose 5 % 50 mL IVPB, 2,000 mg, IntraVENous, On Call to OR, Ted Douglas, DO    0.9 % sodium chloride infusion, , IntraVENous, Continuous, Sun Moore Jason Gonzales MD  Allergies   Allergen Reactions    Lisinopril      COUGH     family history includes Diabetes in her father, maternal grandfather, maternal grandmother, mother, and another family member; High Blood Pressure in an other family member; Hypertension in her maternal grandfather and maternal grandmother; Lupus in her sister; No Known Problems in her paternal grandfather and paternal grandmother; Other in an other family member; Rheum Arthritis in her mother and sister. REVIEW OF SYSTEMS:    Pertinent positives and negatives are mentioned in the HPI. Otherwise, all other systems were reviewed and negative. PHYSICAL EXAM:  /79   Pulse 102   Temp 98.1 °F (36.7 °C) (Oral)   Resp 16   Ht 5' 7\" (1.702 m)   Wt 193 lb (87.5 kg)   SpO2 99%   BMI 30.23 kg/m²   Constitutional: Appears well-developed and well-nourished. Grooming appropriate. No gross deformities. Body mass index is 30.23 kg/m². Eyes: No scleral icterus. Conjunctiva/lids normal. Vision intact grossly. Pupils equal/symmetric, reactive bilaterally. ENT: External ears/nose without defect, scars, or masses. Hearing grossly intact. No facial deformity. Lips normal, normal dentition. Neck: No masses. Trachea midline. No crepitus. Thyroid not enlarged. Cardiovascular: Normal rate. No peripheral edema. Abdominal aorta normal size to palpation. Pulmonary/Chest: Effort normal. No respiratory distress. No wheezes. No use of accessory muscles. Musculoskeletal: Normal range of motion x all 4 extremities and head/neck, without deformity, pain, or crepitus, with normal strength/tone. Nails without clubbing/cyanosis. Neurological: Alert and oriented to person, place, and time. No gross deficits. Sensation intact grossly. Skin: Skin is dry. No rashes noted. No pallor. No induration or nodules. Psychiatric: Normal mood and affect. Behavior normal. Oriented to person, place, and time. Judgment and insight reasonable.     Abdominal/wound: Right groin and labia majora noted to have area of extreme tenderness, fluctuance, cellulitis consistent with abscess versus necrotizing soft tissue infection  Anorectal: Inspection reveals no evidence of perianal abscess or fistula    MDM:  Review/order labs: HbA1c reviewed  Review/order radiology: none  Review/order other tests: none  Discussion of tests w/ performing: none  Old records/other history provider: none  Coordination/discussion w/ other providers: Coordination with her PCP, Whitley Krishna  Independent interpretation of: none    Анна Izaguirre MD

## 2022-04-15 NOTE — PROGRESS NOTES
Spoke with Dr. Sandro Mckenzie. New orders received to give Fentanyl 100mcg in 25mcg increments every 5-10 minutes as needed for pain pre-op for right inner thigh abcess pain.

## 2022-04-15 NOTE — ASSESSMENT & PLAN NOTE
Patient this time has been referred to surgery. Discussed with Dr. Christina Osei who has willingly agreed to see patient in office today.   She was brought to the office and checked in for further evaluation by surgical team.

## 2022-04-15 NOTE — H&P
History and physical exam performed within the last 24 hours by FERNANDO Matamoros CNP. No changes in the interval since H&P completed.     FERNANDO Caba CNP 4/15/2022

## 2022-04-15 NOTE — TELEPHONE ENCOUNTER
Called and spoke to pt, she states that she is in so much pain but she will try to take a picture and send it through [facility] as soon as possible.

## 2022-04-15 NOTE — ANESTHESIA PRE PROCEDURE
Department of Anesthesiology  Preprocedure Note       Name:  Jarvis Marie   Age:  56 y.o.  :  1965                                          MRN:  [id number]         Date:  4/15/2022      Surgeon: Kiarra Saavedra):  John Steward MD    Procedure: Procedure(s):  RIGHT UPPER THIGH INCISION AND DRAINAGE    Medications prior to admission:   Prior to Admission medications    Medication Sig Start Date End Date Taking? Authorizing Provider   losartan-hydroCHLOROthiazide (HYZAAR) 100-25 MG per tablet TAKE 1 TABLET BY MOUTH ONE TIME A DAY 22   FERNANDO Lang CNP   ibuprofen (ADVIL;MOTRIN) 600 MG tablet Take 1 tablet by mouth every 6 hours as needed for Pain 3/27/22   America Meraz PA-C   vitamin D (ERGOCALCIFEROL) 1.25 MG (80193 UT) CAPS capsule Take 1 capsule by mouth once a week 3/15/22   FERNANDO Sherman CNP   colchicine (COLCRYS) 0.6 MG tablet Take 1 tablet by mouth daily 1.2 mg at the first sign of flare, followed in 1 hour with a single dose of 0.6 mg max of 1.8 mg daily 21   FERNANDO Sherman CNP   albuterol sulfate HFA (VENTOLIN HFA) 108 (90 Base) MCG/ACT inhaler Inhale 2 puffs into the lungs 4 times daily as needed for Wheezing 21   FERNANDO Sherman CNP   cetirizine (ZYRTEC) 10 MG tablet Take 10 mg by mouth daily As needed    Historical Provider, MD       Current medications:    No current facility-administered medications for this visit. No current outpatient medications on file.      Facility-Administered Medications Ordered in Other Visits   Medication Dose Route Frequency Provider Last Rate Last Admin    lactated ringers infusion   IntraVENous Continuous John Steward  mL/hr at 04/15/22 1345 New Bag at 04/15/22 1345    ceFAZolin (ANCEF) 2000 mg in dextrose 5 % 50 mL IVPB  2,000 mg IntraVENous On Call to C/ Sushant Aguilar [address], DO        fentaNYL (SUBLIMAZE) injection 100 mcg  100 mcg IntraVENous Once Kesha Hernandez MD  0.9 % sodium chloride infusion   IntraVENous Continuous Boris Grijalva MD        sodium chloride flush 0.9 % injection 5-40 mL  5-40 mL IntraVENous 2 times per day Boris Grijalva MD        sodium chloride flush 0.9 % injection 5-40 mL  5-40 mL IntraVENous PRN Boris Grijalva MD        0.9 % sodium chloride infusion  25 mL IntraVENous PRN Boris Grijalva MD        meperidine (DEMEROL) injection 12.5 mg  12.5 mg IntraVENous Q5 Min PRN Boris Grijalva MD        fentaNYL (SUBLIMAZE) injection 25 mcg  25 mcg IntraVENous Q5 Min PRN Boris Grijalva MD        HYDROmorphone (DILAUDID) injection 0.5 mg  0.5 mg IntraVENous Q5 Min PRN Boris Grijalva MD        oxyCODONE (ROXICODONE) immediate release tablet 5 mg  5 mg Oral PRN MD Deven Tom    oxyCODONE (ROXICODONE) immediate release tablet 10 mg  10 mg Oral PRN Boris Grijalva MD        ondansetron [facility]) injection 4 mg  4 mg IntraVENous Once PRN Boris Grijalva MD        prochlorperazine (COMPAZINE) 5 mg in sodium chloride (PF) 10 mL injection  5 mg IntraVENous Once PRN Boris Grijalva MD        LORazepam (ATIVAN) injection 0.5 mg  0.5 mg IntraVENous Once PRN Boris Grijalva MD        diphenhydrAMINE (BENADRYL) injection 12.5 mg  12.5 mg IntraVENous Once PRN Boris Grijalva MD        labetalol (NORMODYNE;TRANDATE) injection 10 mg  10 mg IntraVENous Q15 Min PRN Boris Grijalva MD           Allergies:     Allergies   Allergen Reactions    Lisinopril      COUGH       Problem List:    Patient Active Problem List   Diagnosis Code    Cardiomyopathy ([facility] I42.9    DELUNA (dyspnea on exertion) R06.00    Tobacco abuse Z72.0    Alcohol ingestion Z78.9    Chronic gout of left foot M1A.0720    Essential hypertension I10    Other mixed anxiety disorders F41.3    Bronchitis J40    Gout M10.9    Abscess L02.91    Abscess of right thigh L02.415       Past Medical History:        Diagnosis Date    Allergic rhinitis     Cardiomyopathy ([facility]     Depression     Gout     Hypertension        Past Surgical History:        Procedure Laterality Date    APPENDECTOMY      COLONOSCOPY  11/5/2021    COLONOSCOPY POLYPECTOMY SNARE/COLD BIOPSY performed by Ria Reagan MD at [address] [place] [address] Road      neck    HYSTERECTOMY      PARTIAL HYSTERECTOMY         Social History:    Social History     Tobacco Use    Smoking status: Current Every Day Smoker     Packs/day: 0.20     Years: 31.00     Pack years: 6.20     Types: Cigarettes    Smokeless tobacco: Never Used   Substance Use Topics    Alcohol use: Yes     Alcohol/week: 2.0 standard drinks     Types: 2 Glasses of wine per week     Comment: OCC                                Ready to quit: Not Answered  Counseling given: Not Answered      Vital Signs (Current): There were no vitals filed for this visit.                                            BP Readings from Last 3 Encounters:   04/15/22 135/80   04/15/22 126/80   03/27/22 135/74       NPO Status:                                                                                 BMI:   Wt Readings from Last 3 Encounters:   04/15/22 193 lb (87.5 kg)   04/15/22 193 lb (87.5 kg)   03/27/22 192 lb (87.1 kg)     There is no height or weight on file to calculate BMI.    CBC:   Lab Results   Component Value Date    WBC 9.1 07/29/2021    RBC 4.05 07/29/2021    HGB 12.2 07/29/2021    HCT 36.4 07/29/2021    MCV 89.8 07/29/2021    RDW 14.1 07/29/2021     07/29/2021       CMP:   Lab Results   Component Value Date     03/14/2022    K 4.5 03/14/2022     03/14/2022    CO2 23 03/14/2022    BUN 17 03/14/2022    CREATININE 1.0 03/14/2022    GFRAA >60 03/14/2022    GFRAA >60 05/05/2010    AGRATIO 1.4 03/14/2022    LABGLOM 58 03/14/2022    GLUCOSE 112 03/14/2022    PROT 7.4 03/14/2022    CALCIUM 9.5 03/14/2022    BILITOT 0.6 03/14/2022    ALKPHOS 83 03/14/2022    AST 13 03/14/2022    ALT 18 03/14/2022       POC Tests: No results for input(s): POCGLU, Tiffanie Goode, POCCL, POCBUN, POCHEMO, POCHCT in the last 72 hours. Coags: No results found for: PROTIME, INR, APTT    HCG (If Applicable): No results found for: PREGTESTUR, PREGSERUM, HCG, HCGQUANT     ABGs: No results found for: PHART, PO2ART, QFO7OVT, QHG6ECG, BEART, E9GTODFK     Type & Screen (If Applicable):  No results found for: LABABO, LABRH    Drug/Infectious Status (If Applicable):  No results found for: HIV, HEPCAB    COVID-19 Screening (If Applicable): No results found for: COVID19        Anesthesia Evaluation    Airway: Mallampati: II  TM distance: >3 FB   Neck ROM: full  Mouth opening: > = 3 FB Dental:          Pulmonary:   (+) shortness of breath:  asthma: exercise-induced asthma, current smoker                           Cardiovascular:  Exercise tolerance: good (>4 METS),   (+) hypertension:, CHF:, DELUNA:,         Rhythm: regular  Rate: normal                    Neuro/Psych:      (-) seizures and CVA           GI/Hepatic/Renal:   (+) GERD: poorly controlled,           Endo/Other:        (-) diabetes mellitus               Abdominal:             Vascular: Other Findings:               Anesthesia Plan      general     ASA 3     (      Echo 2021  Conclusions      Summary   Left ventricular cavity size is normal with normal left ventricular wall   thickness. Overall left ventricular systolic function appears mildly reduced. Ejection fraction is visually estimated to be 50%. There is mild diffuse hypokinesis. Grade I diastolic dysfunction with normal LV filling pressures. Mitral valve leaflets appear mildly thickened. Mild mitral annular calcification. Mild mitral regurgitation. Aortic valve appears sclerotic but opens adequately. Trivial tricuspid regurgitation. Trivial pulmonic regurgitation present.)  Induction: intravenous. Anesthetic plan and risks discussed with patient. Plan discussed with CRNA.                   Sheilda Fothergill, MD   4/15/2022

## 2022-04-15 NOTE — PROGRESS NOTES
Spoke to Zach Ferraro in [place] 2 who talked to surgical resident and d/t pt surgery postment, pt to go to room on [address] [facility] inpatient and wait for surgery planned for later tonight.

## 2022-04-15 NOTE — PROGRESS NOTES
4 Eyes Skin Assessment     The patient is being assess for   Admission    I agree that 2 RN's have performed a thorough Head to Toe Skin Assessment on the patient. ALL assessment sites listed below have been assessed. Areas assessed by both nurses:   [x]   Head, Face, and Ears   [x]   Shoulders, Back, and Chest, Abdomen  [x]   Arms, Elbows, and Hands   [x]   Coccyx, Sacrum, and Ischium  [x]   Legs, Feet, and Heels        Patient's skin is unremarkable. Exception to right abscess on right inner thigh. Redness noted to old surgical scar site. **SHARE this note so that the co-signing nurse is able to place an eSignature**    Co-signer eSignature: Electronically signed by Hank Hoff RN on 4/15/22 at 6:06 PM EDT    Does the Patient have Skin Breakdown?   No          Niranjan Prevention initiated:  No   Wound Care Orders initiated:  No      C nurse consulted for Pressure Injury (Stage 3,4, Unstageable, DTI, NWPT, Complex wounds)and New or Established Ostomies:  No      Primary Nurse eSignature: Electronically signed by Cierra Gonzales RN on 4/15/22 at 5:57 PM EDT

## 2022-04-15 NOTE — PROGRESS NOTES
Patient transferred to 53 from same day. Patient is alert and oriented. VSS. Afebrile. Patient is safely able to ambulate independently. IV fluids infusing. Bed is locked, in lowest position. Oriented to call light system, call light within reach. Pt is aware of NPO order. 4 eyes completed. Will continue to monitor.

## 2022-04-15 NOTE — RT PROTOCOL NOTE
RT Nebulizer Bronchodilator Protocol Note    There is a bronchodilator order in the chart from a provider indicating to follow the RT Bronchodilator Protocol and there is an Initiate RT Bronchodilator Protocol order as well (see protocol at bottom of note). CXR Findings:  No results found. The findings from the last RT Protocol Assessment were as follows:  Smoking: Smoker 15 pack years or more  Respiratory Pattern: Regular pattern and RR 12-20 bpm  Breath Sounds: Clear breath sounds  Cough: Strong, spontaneous, non-productive  Indication for Bronchodilator Therapy:    Bronchodilator Assessment Score: 1    Aerosolized bronchodilator medication orders have been revised according to the RT Nebulizer Bronchodilator Protocol below. Respiratory Therapist to perform RT Therapy Protocol Assessment initially then follow the protocol. Repeat RT Therapy Protocol Assessment PRN for score 0-3 or on second treatment, BID, and PRN for scores above 3. No Indications - adjust the frequency to every 6 hours PRN wheezing or bronchospasm, if no treatments needed after 48 hours then discontinue using Per Protocol order mode. If indication present, adjust the RT bronchodilator orders based on the Bronchodilator Assessment Score as indicated below. If a patient is on this medication at home then do not decrease Frequency below that used at home. 0-3 - enter or revise RT bronchodilator order(s) to equivalent RT Bronchodilator order with Frequency of every 4 hours PRN for wheezing or increased work of breathing using Per Protocol order mode. 4-6 - enter or revise RT Bronchodilator order(s) to two equivalent RT bronchodilator orders with one order with BID Frequency and one order with Frequency of every 4 hours PRN wheezing or increased work of breathing using Per Protocol order mode.          7-10 - enter or revise RT Bronchodilator order(s) to two equivalent RT bronchodilator orders with one order with TID Frequency and one order with Frequency of every 4 hours PRN wheezing or increased work of breathing using Per Protocol order mode. 11-13 - enter or revise RT Bronchodilator order(s) to one equivalent RT bronchodilator order with QID Frequency and an Albuterol order with Frequency of every 4 hours PRN wheezing or increased work of breathing using Per Protocol order mode. Greater than 13 - enter or revise RT Bronchodilator order(s) to one equivalent RT bronchodilator order with every 4 hours Frequency and an Albuterol order with Frequency of every 2 hours PRN wheezing or increased work of breathing using Per Protocol order mode. RT to enter RT Home Evaluation for COPD & MDI Assessment order using Per Protocol order mode.     Electronically signed by Odell Cadet RCP on 4/15/2022 at 6:04 PM

## 2022-04-15 NOTE — PROGRESS NOTES
Talon Gongora (:  1965) is a 56 y.o. female,Established patient, here for evaluation of the following chief complaint(s):  Abscess      ASSESSMENT/PLAN:  1. Pre-diabetes  -     POCT glycosylated hemoglobin (Hb A1C)  2. Abscess  Assessment & Plan:   Patient this time has been referred to surgery. Discussed with Dr. Everardo Swartz who has willingly agreed to see patient in office today. She was brought to the office and checked in for further evaluation by surgical team.  Orders:  -     Julio Kaye MD (Colonoscopy), General Surgery, [facility]      No follow-ups on file. SUBJECTIVE/OBJECTIVE:  HPI  Patient in office today with complaints of right upper thigh abscess. She states she was seen in the emergency room at [facility] on 27 May. She states she has had abscesses like these in the past that typically drained on their own and resolved with warm compresses. She states the one previously was not getting better so she reported to the emergency room. She states while there she did an incision and drainage with topical lidocaine anesthesia. Per note a large amount of purulent discharge was expressed and the wound was packed. She was sent home with Percocet and Keflex and advised to follow-up in the office. Today she presents with recurrence of abscess in the same location with severe pain. She denies any current fevers. She denies any current drainage from the abscess specifically.   Current Outpatient Medications   Medication Sig Dispense Refill    losartan-hydroCHLOROthiazide (HYZAAR) 100-25 MG per tablet TAKE 1 TABLET BY MOUTH ONE TIME A DAY 90 tablet 1    ibuprofen (ADVIL;MOTRIN) 600 MG tablet Take 1 tablet by mouth every 6 hours as needed for Pain 30 tablet 0    vitamin D (ERGOCALCIFEROL) 1.25 MG (05231 UT) CAPS capsule Take 1 capsule by mouth once a week 4 capsule 5    colchicine (COLCRYS) 0.6 MG tablet Take 1 tablet by mouth daily 1.2 mg at the first sign of flare, followed in 1 hour with a single dose of 0.6 mg max of 1.8 mg daily 30 tablet 3    albuterol sulfate HFA (VENTOLIN HFA) 108 (90 Base) MCG/ACT inhaler Inhale 2 puffs into the lungs 4 times daily as needed for Wheezing 3 Inhaler 1    cetirizine (ZYRTEC) 10 MG tablet Take 10 mg by mouth daily As needed       No current facility-administered medications for this visit. Review of Systems   Constitutional: Negative for chills, fatigue and fever. HENT: Negative for congestion, ear pain, postnasal drip, rhinorrhea, sinus pressure, sneezing and sore throat. Eyes: Negative for redness and itching. Respiratory: Negative for cough, chest tightness, shortness of breath and wheezing. Cardiovascular: Negative for chest pain and palpitations. Gastrointestinal: Negative for abdominal pain, blood in stool, constipation, diarrhea, nausea and vomiting. Endocrine: Negative for cold intolerance and heat intolerance. Genitourinary: Negative for difficulty urinating, dysuria, flank pain, frequency, hematuria and urgency. Musculoskeletal: Negative for arthralgias, back pain, joint swelling and myalgias. Skin: Negative for color change, pallor, rash and wound. Abscess     Allergic/Immunologic: Negative for environmental allergies and food allergies. Neurological: Negative for dizziness, seizures, syncope, weakness, light-headedness, numbness and headaches. Hematological: Negative for adenopathy. Does not bruise/bleed easily. Psychiatric/Behavioral: Negative for confusion, sleep disturbance and suicidal ideas. The patient is not nervous/anxious and is not hyperactive. Vitals:    04/15/22 1035   BP: 126/80   Site: Left Upper Arm   Position: Sitting   Cuff Size: Medium Adult   Pulse: 123   Temp: 96.8 °F (36 °C)   SpO2: 94%   Weight: 193 lb (87.5 kg)       Physical Exam  Constitutional:       Appearance: Normal appearance. She is well-developed. HENT:      Head: Normocephalic and atraumatic. Right Ear: Hearing normal.      Left Ear: Hearing normal.      Nose: No mucosal edema. Right Sinus: No maxillary sinus tenderness or frontal sinus tenderness. Left Sinus: No maxillary sinus tenderness or frontal sinus tenderness. Mouth/Throat: Tonsils: No tonsillar abscesses. Eyes:      Extraocular Movements: Extraocular movements intact. Pupils: Pupils are equal, round, and reactive to light. Cardiovascular:      Rate and Rhythm: Normal rate and regular rhythm. Pulses: Normal pulses. Heart sounds: Normal heart sounds. Pulmonary:      Effort: Pulmonary effort is normal.      Breath sounds: Normal breath sounds. Genitourinary:      Lymphadenopathy:      Head:      Right side of head: No submental, submandibular, tonsillar, preauricular, posterior auricular or occipital adenopathy. Left side of head: No submental, submandibular, tonsillar, preauricular, posterior auricular or occipital adenopathy. Skin:     General: Skin is warm and dry. Neurological:      Mental Status: She is alert. Psychiatric:         Mood and Affect: Mood normal.         Behavior: Behavior normal.           On this date 4/15/2022 I have spent 30 minutes reviewing previous notes, test results and face to face with the patient discussing the diagnosis and importance of compliance with the treatment plan as well as documenting on the day of the visit. An electronic signature was used to authenticate this note.     --FERNANDO Quintero - CNP

## 2022-04-16 VITALS
WEIGHT: 193 LBS | SYSTOLIC BLOOD PRESSURE: 125 MMHG | HEART RATE: 105 BPM | RESPIRATION RATE: 18 BRPM | HEIGHT: 67 IN | TEMPERATURE: 98.7 F | BODY MASS INDEX: 30.29 KG/M2 | OXYGEN SATURATION: 96 % | DIASTOLIC BLOOD PRESSURE: 60 MMHG

## 2022-04-16 VITALS — DIASTOLIC BLOOD PRESSURE: 59 MMHG | OXYGEN SATURATION: 97 % | SYSTOLIC BLOOD PRESSURE: 123 MMHG

## 2022-04-16 LAB
ALBUMIN SERPL-MCNC: 3.9 G/DL (ref 3.4–5)
ANION GAP SERPL CALCULATED.3IONS-SCNC: 12 MMOL/L (ref 3–16)
BASOPHILS ABSOLUTE: 0.1 K/UL (ref 0–0.2)
BASOPHILS RELATIVE PERCENT: 0.9 %
BUN BLDV-MCNC: 12 MG/DL (ref 7–20)
CALCIUM SERPL-MCNC: 9.1 MG/DL (ref 8.3–10.6)
CHLORIDE BLD-SCNC: 102 MMOL/L (ref 99–110)
CO2: 22 MMOL/L (ref 21–32)
CREAT SERPL-MCNC: 1 MG/DL (ref 0.6–1.1)
EOSINOPHILS ABSOLUTE: 0.1 K/UL (ref 0–0.6)
EOSINOPHILS RELATIVE PERCENT: 0.6 %
GFR AFRICAN AMERICAN: >60
GFR NON-AFRICAN AMERICAN: 58
GLUCOSE BLD-MCNC: 106 MG/DL (ref 70–99)
HCT VFR BLD CALC: 33 % (ref 36–48)
HEMOGLOBIN: 10.6 G/DL (ref 12–16)
LYMPHOCYTES ABSOLUTE: 2.9 K/UL (ref 1–5.1)
LYMPHOCYTES RELATIVE PERCENT: 26 %
MAGNESIUM: 1.2 MG/DL (ref 1.8–2.4)
MCH RBC QN AUTO: 29.4 PG (ref 26–34)
MCHC RBC AUTO-ENTMCNC: 32.2 G/DL (ref 31–36)
MCV RBC AUTO: 91.1 FL (ref 80–100)
MONOCYTES ABSOLUTE: 0.8 K/UL (ref 0–1.3)
MONOCYTES RELATIVE PERCENT: 6.9 %
NEUTROPHILS ABSOLUTE: 7.3 K/UL (ref 1.7–7.7)
NEUTROPHILS RELATIVE PERCENT: 65.6 %
PDW BLD-RTO: 13.7 % (ref 12.4–15.4)
PHOSPHORUS: 4.2 MG/DL (ref 2.5–4.9)
PLATELET # BLD: 195 K/UL (ref 135–450)
PMV BLD AUTO: 10.1 FL (ref 5–10.5)
POTASSIUM SERPL-SCNC: 4 MMOL/L (ref 3.5–5.1)
RBC # BLD: 3.62 M/UL (ref 4–5.2)
SODIUM BLD-SCNC: 136 MMOL/L (ref 136–145)
WBC # BLD: 11.1 K/UL (ref 4–11)

## 2022-04-16 PROCEDURE — 85025 COMPLETE CBC W/AUTO DIFF WBC: CPT

## 2022-04-16 PROCEDURE — 80069 RENAL FUNCTION PANEL: CPT

## 2022-04-16 PROCEDURE — A4217 STERILE WATER/SALINE, 500 ML: HCPCS | Performed by: SURGERY

## 2022-04-16 PROCEDURE — 6360000002 HC RX W HCPCS: Performed by: FAMILY MEDICINE

## 2022-04-16 PROCEDURE — 6360000002 HC RX W HCPCS

## 2022-04-16 PROCEDURE — 2580000003 HC RX 258

## 2022-04-16 PROCEDURE — 99024 POSTOP FOLLOW-UP VISIT: CPT | Performed by: SURGERY

## 2022-04-16 PROCEDURE — 2580000003 HC RX 258: Performed by: SURGERY

## 2022-04-16 PROCEDURE — 36415 COLL VENOUS BLD VENIPUNCTURE: CPT

## 2022-04-16 PROCEDURE — 6370000000 HC RX 637 (ALT 250 FOR IP)

## 2022-04-16 PROCEDURE — 2500000003 HC RX 250 WO HCPCS: Performed by: SURGERY

## 2022-04-16 PROCEDURE — 83735 ASSAY OF MAGNESIUM: CPT

## 2022-04-16 PROCEDURE — 2500000003 HC RX 250 WO HCPCS: Performed by: FAMILY MEDICINE

## 2022-04-16 RX ORDER — MAGNESIUM HYDROXIDE 1200 MG/15ML
LIQUID ORAL CONTINUOUS PRN
Status: COMPLETED | OUTPATIENT
Start: 2022-04-16 | End: 2022-04-16

## 2022-04-16 RX ORDER — PROPOFOL 10 MG/ML
INJECTION, EMULSION INTRAVENOUS PRN
Status: DISCONTINUED | OUTPATIENT
Start: 2022-04-16 | End: 2022-04-16 | Stop reason: SDUPTHER

## 2022-04-16 RX ORDER — MIDAZOLAM HYDROCHLORIDE 1 MG/ML
INJECTION INTRAMUSCULAR; INTRAVENOUS PRN
Status: DISCONTINUED | OUTPATIENT
Start: 2022-04-15 | End: 2022-04-16 | Stop reason: SDUPTHER

## 2022-04-16 RX ORDER — FENTANYL CITRATE 50 UG/ML
INJECTION, SOLUTION INTRAMUSCULAR; INTRAVENOUS PRN
Status: DISCONTINUED | OUTPATIENT
Start: 2022-04-15 | End: 2022-04-16 | Stop reason: SDUPTHER

## 2022-04-16 RX ORDER — ACETAMINOPHEN 500 MG
1000 TABLET ORAL EVERY 6 HOURS PRN
Status: DISCONTINUED | OUTPATIENT
Start: 2022-04-16 | End: 2022-04-16 | Stop reason: HOSPADM

## 2022-04-16 RX ORDER — KETAMINE HYDROCHLORIDE 50 MG/ML
INJECTION, SOLUTION, CONCENTRATE INTRAMUSCULAR; INTRAVENOUS PRN
Status: DISCONTINUED | OUTPATIENT
Start: 2022-04-15 | End: 2022-04-16 | Stop reason: SDUPTHER

## 2022-04-16 RX ORDER — LANOLIN ALCOHOL/MO/W.PET/CERES
400 CREAM (GRAM) TOPICAL ONCE
Status: COMPLETED | OUTPATIENT
Start: 2022-04-16 | End: 2022-04-16

## 2022-04-16 RX ORDER — OXYCODONE HYDROCHLORIDE AND ACETAMINOPHEN 5; 325 MG/1; MG/1
1 TABLET ORAL EVERY 6 HOURS PRN
Qty: 28 TABLET | Refills: 0 | Status: ON HOLD | OUTPATIENT
Start: 2022-04-16 | End: 2022-04-18

## 2022-04-16 RX ORDER — BUPIVACAINE HYDROCHLORIDE AND EPINEPHRINE 5; 5 MG/ML; UG/ML
INJECTION, SOLUTION EPIDURAL; INTRACAUDAL; PERINEURAL PRN
Status: DISCONTINUED | OUTPATIENT
Start: 2022-04-16 | End: 2022-04-16 | Stop reason: ALTCHOICE

## 2022-04-16 RX ADMIN — PROPOFOL 60 MG: 10 INJECTION, EMULSION INTRAVENOUS at 00:08

## 2022-04-16 RX ADMIN — HYDROMORPHONE HYDROCHLORIDE 0.5 MG: 1 INJECTION, SOLUTION INTRAMUSCULAR; INTRAVENOUS; SUBCUTANEOUS at 01:06

## 2022-04-16 RX ADMIN — HYDROMORPHONE HYDROCHLORIDE 0.25 MG: 1 INJECTION, SOLUTION INTRAMUSCULAR; INTRAVENOUS; SUBCUTANEOUS at 14:59

## 2022-04-16 RX ADMIN — HYDROMORPHONE HYDROCHLORIDE 0.5 MG: 1 INJECTION, SOLUTION INTRAMUSCULAR; INTRAVENOUS; SUBCUTANEOUS at 00:48

## 2022-04-16 RX ADMIN — KETAMINE HYDROCHLORIDE 10 MG: 50 INJECTION, SOLUTION INTRAMUSCULAR; INTRAVENOUS at 00:02

## 2022-04-16 RX ADMIN — CEFAZOLIN SODIUM 2000 MG: 10 INJECTION, POWDER, FOR SOLUTION INTRAVENOUS at 00:00

## 2022-04-16 RX ADMIN — PROPOFOL 40 MG: 10 INJECTION, EMULSION INTRAVENOUS at 00:14

## 2022-04-16 RX ADMIN — SODIUM CHLORIDE, PRESERVATIVE FREE 10 ML: 5 INJECTION INTRAVENOUS at 09:25

## 2022-04-16 RX ADMIN — OXYCODONE 10 MG: 5 TABLET ORAL at 06:12

## 2022-04-16 RX ADMIN — HYDROMORPHONE HYDROCHLORIDE 0.5 MG: 1 INJECTION, SOLUTION INTRAMUSCULAR; INTRAVENOUS; SUBCUTANEOUS at 00:59

## 2022-04-16 RX ADMIN — HYDROMORPHONE HYDROCHLORIDE 0.5 MG: 1 INJECTION, SOLUTION INTRAMUSCULAR; INTRAVENOUS; SUBCUTANEOUS at 09:25

## 2022-04-16 RX ADMIN — HYDROMORPHONE HYDROCHLORIDE 0.5 MG: 1 INJECTION, SOLUTION INTRAMUSCULAR; INTRAVENOUS; SUBCUTANEOUS at 04:44

## 2022-04-16 RX ADMIN — Medication 400 MG: at 09:25

## 2022-04-16 RX ADMIN — PROPOFOL 20 MG: 10 INJECTION, EMULSION INTRAVENOUS at 00:12

## 2022-04-16 ASSESSMENT — PAIN SCALES - GENERAL
PAINLEVEL_OUTOF10: 0
PAINLEVEL_OUTOF10: 4
PAINLEVEL_OUTOF10: 7
PAINLEVEL_OUTOF10: 10
PAINLEVEL_OUTOF10: 7
PAINLEVEL_OUTOF10: 10
PAINLEVEL_OUTOF10: 4
PAINLEVEL_OUTOF10: 5
PAINLEVEL_OUTOF10: 9
PAINLEVEL_OUTOF10: 8
PAINLEVEL_OUTOF10: 0

## 2022-04-16 ASSESSMENT — PULMONARY FUNCTION TESTS
PIF_VALUE: 1

## 2022-04-16 ASSESSMENT — PAIN DESCRIPTION - ONSET
ONSET: AWAKENED FROM SLEEP
ONSET: ON-GOING
ONSET: GRADUAL

## 2022-04-16 ASSESSMENT — PAIN DESCRIPTION - ORIENTATION
ORIENTATION: RIGHT
ORIENTATION: MID
ORIENTATION: LEFT
ORIENTATION: RIGHT

## 2022-04-16 ASSESSMENT — PAIN DESCRIPTION - PAIN TYPE
TYPE: SURGICAL PAIN
TYPE: SURGICAL PAIN
TYPE: SURGICAL PAIN;ACUTE PAIN

## 2022-04-16 ASSESSMENT — PAIN DESCRIPTION - FREQUENCY
FREQUENCY: INTERMITTENT
FREQUENCY: CONTINUOUS
FREQUENCY: CONTINUOUS

## 2022-04-16 ASSESSMENT — PAIN DESCRIPTION - DESCRIPTORS
DESCRIPTORS: BURNING
DESCRIPTORS: ACHING;BURNING
DESCRIPTORS: ACHING

## 2022-04-16 ASSESSMENT — PAIN DESCRIPTION - LOCATION
LOCATION: PERINEUM;GROIN
LOCATION: GROIN;PERINEUM
LOCATION: GROIN
LOCATION: GROIN;PERINEUM

## 2022-04-16 ASSESSMENT — PAIN DESCRIPTION - PROGRESSION
CLINICAL_PROGRESSION: NOT CHANGED
CLINICAL_PROGRESSION: NOT CHANGED

## 2022-04-16 NOTE — BRIEF OP NOTE
Brief Postoperative Note      Patient:  Patricia Garnett  YOB: 1965  MRN: [id number]    Date of Procedure: 4/15/2022    Pre-Op Diagnosis: ABSCESS RIGHT UPPER THIGH    Post-Op Diagnosis: Same       Procedure(s):  RIGHT UPPER THIGH INCISION AND DRAINAGE    Surgeon(s):  Yesica Kelley MD    Assistant:  Resident: Pineda Mayen DO    Anesthesia: General    Estimated Blood Loss (mL): Minimal    Complications: None    Specimens:   * No specimens in log *    Implants:  * No implants in log *      Drains: * No LDAs found *    Findings: R groin abscess with purulent drainage; saline soaked Kerlix packed into wound    Electronically signed by Enedelia Ga DO on 4/16/2022 at 4:40 AM Consent: Written consent obtained, risks reviewed including but not limited to crusting, scabbing, blistering, scarring, darker or lighter pigmentary change, incidental hair removal, bruising, and/or incomplete removal.

## 2022-04-16 NOTE — PROGRESS NOTES
Pt returned from PACU. VSS. Pt c/o feeling hungry. Frozen dinner provided. Pt tolerated well. Pt denies pain at this time. Call light in reach . Electronically signed by Jamar Murphy RN on 4/16/22 at 3:21 AM EDT

## 2022-04-16 NOTE — ANESTHESIA POSTPROCEDURE EVALUATION
Department of Anesthesiology  Postprocedure Note    Patient: Jonathan Valencia  MRN: [id number]  YOB: 1965  Date of evaluation: 4/16/2022  Time:  12:30 AM     Procedure Summary     Date: 04/15/22 Room / Location: [address] [place] [address]    Anesthesia Start: 2356 Anesthesia Stop: 04/16/22 0029    Procedure: RIGHT UPPER THIGH INCISION AND DRAINAGE (Right ) Diagnosis: (ABSCESS RIGHT UPPER THIGH)    Surgeons: Seng Marshall MD Responsible Provider: Nayan Gil MD    Anesthesia Type: general ASA Status: 3          Anesthesia Type: general    Twyla Phase I: Twyla Score: 10    Twyla Phase II:      Last vitals: Reviewed and per EMR flowsheets.        Anesthesia Post Evaluation    Patient location during evaluation: PACU  Level of consciousness: awake  Complications: no  Multimodal analgesia pain management approach

## 2022-04-16 NOTE — PROGRESS NOTES
General Surgery   Daily Progress Note  Patient: Zoë Guzmán      CC: Right groin abscess    SUBJECTIVE:   No acute events overnight. Patient with brief tachycardia immediately post-op, which has improved. Otherwise afebrile and HDS. Complaining of pain to the right groin. ROS:   A 14 point review of systems was conducted, significant findings as noted above. All other systems negative. OBJECTIVE:    PHYSICAL EXAM:    Vitals:    04/16/22 0100 04/16/22 0115 04/16/22 0130 04/16/22 0140   BP: 132/74 138/71 126/66 126/68   Pulse: 93 91 95 94   Resp: 12 15 16 17   Temp:  97.8 °F (36.6 °C) 97.8 °F (36.6 °C) 98.4 °F (36.9 °C)   TempSrc:  Temporal Temporal Oral   SpO2: 100% 100% 100% 99%   Weight:       Height:           General appearance: alert, no acute distress, grooming appropriate  Eyes: No scleral icterus, EOM grossly intact  Neck: trachea midline, no JVD, no lymphadenopathy, neck supple  Chest/Lungs: Equal excursion bilaterally, normal effort with no accessory muscle use on 2L NC  Cardiovascular: RRR, brisk capillary refill  Abdomen: Soft, non-tender, non-distended, no rebound, guarding, or rigidity. Skin: warm and dry, no rashes  Extremities: no edema, no cyanosis; Right groin wound with packing in place  Neuro: A&Ox3, no focal deficits, sensation intact    LABS:   No results for input(s): WBC, HGB, HCT, MCV, PLT in the last 72 hours. No results for input(s): NA, K, CL, CO2, PHOS, BUN, CREATININE, CA in the last 72 hours. No results for input(s): AST, ALT, ALB, BILIDIR, BILITOT, ALKPHOS in the last 72 hours. No results for input(s): LIPASE, AMYLASE in the last 72 hours. No results for input(s): PROT, INR, APTT in the last 72 hours. No results for input(s): CKTOTAL, CKMB, CKMBINDEX, TROPONINI in the last 72 hours. ASSESSMENT & PLAN:   This is a 56 y.o. female with a diagnosis of right groin abscess s/p I&D.  POD #1.    - Will return later to replace packing  - Continue diet, if doing well, will anticipate discharge later today    Cathleen Krause DO  PGY1, General Surgery  04/16/22  6:46 AM  [phone]

## 2022-04-16 NOTE — CARE COORDINATION
Case Management Assessment            Discharge Note                    Date / Time of Note: 4/16/2022 1:51 PM                  Discharge Note Completed by: Abhinav Orozco RN    Patient Name: Christopher Griffin   YOB: 1965  Diagnosis: Abscess of right thigh [L02.415]  Abscess of right groin [L02.214]   Date / Time: 4/15/2022 12:07 PM    Current PCP: FERNANDO Le CNP  Clinic patient: No    Hospitalization in the last 30 days: No    Advance Directives:  Code Status: Full Code  [place] DNR form completed and on chart: Not Indicated    Financial:  Payor: Darlyn Bowen / Plan: [address] [place] [address] / Product Type: *No Product type* /      Pharmacy:    [address] [place] [address] 20, [address] [place] [address], [address] [place] [address]  Phone: [phone] Fax: [phone]    [facility] [id number] [facility], [address] [place] [address] [phone] [facility] [phone]  [facility] [address]  Phone: [phone] Fax: [phone]      Assistance purchasing medications?: Potential Assistance Purchasing Medications: No  Assistance provided by Case Management: None at this time    Does patient want to participate in local refill/ meds to beds program?:      Meds To Beds General Rules:  1. Can ONLY be done Monday- Friday between 8:30am-5pm  2. Prescription(s) must be in pharmacy by 3pm to be filled same day  3. Copy of patient's insurance/ prescription drug card and patient face sheet must be sent along with the prescription(s)  4. Cost of Rx cannot be added to hospital bill. If financial assistance is needed, please contact unit  or ;  or  CANNOT provide pharmacy voucher for patients co-pays  5.  Patients can then  the prescription on their way out of the hospital at discharge, or pharmacy can deliver to the bedside if staff is available. (payment due at time of pick-up or delivery - cash, check, or card accepted)     Able to afford home medications/ co-pay costs: Yes    ADLS:  Current PT AM-PAC Score:   /24  Current OT AM-PAC Score:   /24      DISCHARGE Disposition: Home- No Services Needed    LOC at discharge: Not Applicable  YESICA Completed: Not Indicated    Notification completed in HENS/PAS?:  Not Applicable    IMM Completed:   Not Indicated    Transportation:  Transportation PLAN for discharge: family   Mode of Transport: [facility]:  [address] ordered at discharge: Not [address]: Not Applicable  Orders faxed: No    Durable Medical Equipment:  DME Provider: none  Equipment obtained during hospitalization:     Home Oxygen and Respiratory Equipment:  Oxygen needed at discharge?: Not [address]: Not Applicable  Portable tank available for discharge?: Not Indicated    Dialysis:  Dialysis patient: No    Dialysis Center:  Not Applicable      Additional CM Notes: Pt from home will DC home with family support no needs from CM. Will follow up OP with surgical team     The Plan for Transition of Care is related to the following treatment goals of Abscess of right thigh [L02.415]  Abscess of right groin [L02.214]    The Patient and/or patient representative Shira Vuong and her family were provided with a choice of provider and agrees with the discharge plan Yes    Freedom of choice list was provided with basic dialogue that supports the patient's individualized plan of care/goals and shares the quality data associated with the providers.  Not Indicated    Care Transitions patient: Yes    Corrine Duff RN  [facility]  Case Management Department  Ph: [phone]  Fax: [phone]

## 2022-04-16 NOTE — OP NOTE
Operative Note      Patient: Patricia Garnett  YOB: 1965  MRN: [id number]    Date of Procedure: 4/15/2022    Pre-Op Diagnosis: ABSCESS RIGHT UPPER THIGH    Post-Op Diagnosis: Same       Procedure(s):  RIGHT UPPER THIGH INCISION AND DRAINAGE    Surgeon(s):  Yesica Kelley MD    Assistant:   Resident: Pineda Mayen DO    Anesthesia: General    Estimated Blood Loss (mL): Minimal    Complications: None    Specimens:   * No specimens in log *    Implants:  * No implants in log *      Drains: * No LDAs found *    Findings: Moderate amount of purulence    Detailed Description of Procedure:   Patient was brought to the operating theater and placed supine on the operating table. Sedation started by anesthesia. Patient placed in frog-leg position. Right upper medial thigh and right labia majora prepped and draped using Betadine solution. Timeout performed confirming patient identity as well as the operative site. Antibiotics confirmed perfusing. All safety points followed. Began by opening up the previous stab incision and noted moderate amount of purulence. Cut current on Bovie electrocautery used to incise additional skin to approximately 5 x 3 cm in order to unroof the entire cavity. All purulence drained. Wound irrigated. Cautery used to obtain hemostasis. Wound packed using Kerlix roll. She was then woken up and brought to the PACU in stable condition. Her sister was updated on the operative findings.     Electronically signed by Yesica Kelley MD on 4/16/2022 at 12:25 AM

## 2022-04-16 NOTE — PROGRESS NOTES
Discharge noted. IV removed, belongings gathered and packed. AVS printed and reviewed, all questions answered. Patient was provided with extra gauze and bed pads. Patient to be transported by family. Prescriptions printed, signed, and given to patient.

## 2022-04-16 NOTE — PROGRESS NOTES
PACU Transfer to [address] [place] [address]    Vitals:    04/16/22 0130   BP: 126/66   Pulse: 95   Resp: 17   Temp: 97.8   SpO2: 99%         Intake/Output Summary (Last 24 hours) at 4/16/2022 0145  Last data filed at 4/16/2022 0130  Gross per 24 hour   Intake 155 ml   Output --   Net 155 ml       Pain assessment:  present - adequately treated  4    Patient transferred to care of [employer] RN.    4/16/2022 1:45 AM

## 2022-04-16 NOTE — PROGRESS NOTES
In brief, 53-year-old female who underwent stab incision and drainage in the ED for groin abscess, but has since recurred. Very tender and unable to undergo office drainage procedure. Plan for operative incision and drainage versus debridement later today. Please see in-hospital history and physical for further details.

## 2022-04-16 NOTE — PLAN OF CARE
Problem: Pain:  Goal: Control of acute pain  Description: Control of acute pain  4/16/2022 0327 by Linda Amador RN  Outcome: Ongoing  Pt denies pain. Pt assisted with repositioning Hip to hip. Problem: Skin Integrity:  Goal: Demonstration of wound healing without infection will improve  Description: Demonstration of wound healing without infection will improve  Outcome: Ongoing   Pt sleeping for intervals. Wound vac in place with dsg  cdi. Problem: Physical Regulation:  Goal: Will remain free from infection  Description: Will remain free from infection  Outcome: Ongoing  Pt remains in contact isolation. IV antibiotics given as ordered.

## 2022-04-17 ENCOUNTER — HOSPITAL ENCOUNTER (INPATIENT)
Age: 57
LOS: 2 days | Discharge: HOME HEALTH | DRG: 863 | End: 2022-04-19
Attending: EMERGENCY MEDICINE | Admitting: INTERNAL MEDICINE
Payer: COMMERCIAL

## 2022-04-17 ENCOUNTER — APPOINTMENT (OUTPATIENT)
Dept: CT IMAGING | Age: 57
DRG: 863 | End: 2022-04-17
Payer: COMMERCIAL

## 2022-04-17 DIAGNOSIS — G89.18 POST-OP PAIN: ICD-10-CM

## 2022-04-17 DIAGNOSIS — M79.651 RIGHT THIGH PAIN: Primary | ICD-10-CM

## 2022-04-17 DIAGNOSIS — R00.0 TACHYCARDIA: ICD-10-CM

## 2022-04-17 DIAGNOSIS — M10.9 ACUTE GOUT INVOLVING TOE OF RIGHT FOOT, UNSPECIFIED CAUSE: ICD-10-CM

## 2022-04-17 DIAGNOSIS — L03.115 CELLULITIS OF RIGHT THIGH: ICD-10-CM

## 2022-04-17 DIAGNOSIS — L02.214 ABSCESS OF RIGHT GROIN: ICD-10-CM

## 2022-04-17 PROBLEM — L02.31 CELLULITIS AND ABSCESS OF BUTTOCK: Status: ACTIVE | Noted: 2022-04-17

## 2022-04-17 PROBLEM — L03.317 CELLULITIS AND ABSCESS OF BUTTOCK: Status: ACTIVE | Noted: 2022-04-17

## 2022-04-17 PROBLEM — L03.90 CELLULITIS: Status: ACTIVE | Noted: 2022-04-17

## 2022-04-17 LAB
A/G RATIO: 1.2 (ref 1.1–2.2)
ALBUMIN SERPL-MCNC: 4.1 G/DL (ref 3.4–5)
ALP BLD-CCNC: 99 U/L (ref 40–129)
ALT SERPL-CCNC: 14 U/L (ref 10–40)
ANION GAP SERPL CALCULATED.3IONS-SCNC: 18 MMOL/L (ref 3–16)
AST SERPL-CCNC: 15 U/L (ref 15–37)
BASE EXCESS VENOUS: 0.4 MMOL/L (ref -2–3)
BASOPHILS ABSOLUTE: 0 K/UL (ref 0–0.2)
BASOPHILS RELATIVE PERCENT: 0.2 %
BILIRUB SERPL-MCNC: 0.7 MG/DL (ref 0–1)
BILIRUBIN URINE: NEGATIVE
BLOOD, URINE: NEGATIVE
BUN BLDV-MCNC: 9 MG/DL (ref 7–20)
C-REACTIVE PROTEIN: 125.1 MG/L (ref 0–5.1)
CALCIUM SERPL-MCNC: 9.6 MG/DL (ref 8.3–10.6)
CARBOXYHEMOGLOBIN: 4 % (ref 0–1.5)
CHLORIDE BLD-SCNC: 98 MMOL/L (ref 99–110)
CLARITY: CLEAR
CO2: 20 MMOL/L (ref 21–32)
COLOR: YELLOW
CREAT SERPL-MCNC: 1.1 MG/DL (ref 0.6–1.1)
EOSINOPHILS ABSOLUTE: 0.1 K/UL (ref 0–0.6)
EOSINOPHILS RELATIVE PERCENT: 1.2 %
EPITHELIAL CELLS, UA: ABNORMAL /HPF (ref 0–5)
GFR AFRICAN AMERICAN: >60
GFR NON-AFRICAN AMERICAN: 52
GLUCOSE BLD-MCNC: 111 MG/DL (ref 70–99)
GLUCOSE URINE: NEGATIVE MG/DL
HCG QUALITATIVE: NEGATIVE
HCO3 VENOUS: 27.8 MMOL/L (ref 24–28)
HCT VFR BLD CALC: 41 % (ref 36–48)
HEMOGLOBIN, VEN, REDUCED: 74.3 %
HEMOGLOBIN: 13 G/DL (ref 12–16)
KETONES, URINE: NEGATIVE MG/DL
LACTIC ACID: 1.8 MMOL/L (ref 0.4–2)
LEUKOCYTE ESTERASE, URINE: NEGATIVE
LYMPHOCYTES ABSOLUTE: 2.9 K/UL (ref 1–5.1)
LYMPHOCYTES RELATIVE PERCENT: 26.4 %
MCH RBC QN AUTO: 29.1 PG (ref 26–34)
MCHC RBC AUTO-ENTMCNC: 31.8 G/DL (ref 31–36)
MCV RBC AUTO: 91.4 FL (ref 80–100)
METHEMOGLOBIN VENOUS: 0.6 % (ref 0–1.5)
MICROSCOPIC EXAMINATION: YES
MONOCYTES ABSOLUTE: 0.9 K/UL (ref 0–1.3)
MONOCYTES RELATIVE PERCENT: 8.4 %
NEUTROPHILS ABSOLUTE: 7 K/UL (ref 1.7–7.7)
NEUTROPHILS RELATIVE PERCENT: 63.8 %
NITRITE, URINE: NEGATIVE
O2 SAT, VEN: 22 %
PCO2, VEN: 57.3 MMHG (ref 41–51)
PDW BLD-RTO: 13.5 % (ref 12.4–15.4)
PH UA: 6 (ref 5–8)
PH VENOUS: 7.29 (ref 7.35–7.45)
PLATELET # BLD: 178 K/UL (ref 135–450)
PMV BLD AUTO: 9.9 FL (ref 5–10.5)
PO2, VEN: <30 MMHG (ref 25–40)
POTASSIUM REFLEX MAGNESIUM: 4.4 MMOL/L (ref 3.5–5.1)
PROTEIN UA: 30 MG/DL
RBC # BLD: 4.49 M/UL (ref 4–5.2)
RBC UA: ABNORMAL /HPF (ref 0–4)
SEDIMENTATION RATE, ERYTHROCYTE: 47 MM/HR (ref 0–30)
SODIUM BLD-SCNC: 136 MMOL/L (ref 136–145)
SPECIFIC GRAVITY UA: 1.01 (ref 1–1.03)
TCO2 CALC VENOUS: 30 MMOL/L
TOTAL CK: 96 U/L (ref 26–192)
TOTAL PROTEIN: 7.4 G/DL (ref 6.4–8.2)
URIC ACID, SERUM: 7.7 MG/DL (ref 2.6–6)
URINE TYPE: ABNORMAL
UROBILINOGEN, URINE: 0.2 E.U./DL
WBC # BLD: 11 K/UL (ref 4–11)
WBC UA: ABNORMAL /HPF (ref 0–5)

## 2022-04-17 PROCEDURE — 99283 EMERGENCY DEPT VISIT LOW MDM: CPT

## 2022-04-17 PROCEDURE — 83605 ASSAY OF LACTIC ACID: CPT

## 2022-04-17 PROCEDURE — 85025 COMPLETE CBC W/AUTO DIFF WBC: CPT

## 2022-04-17 PROCEDURE — 84703 CHORIONIC GONADOTROPIN ASSAY: CPT

## 2022-04-17 PROCEDURE — 6360000002 HC RX W HCPCS: Performed by: STUDENT IN AN ORGANIZED HEALTH CARE EDUCATION/TRAINING PROGRAM

## 2022-04-17 PROCEDURE — 72193 CT PELVIS W/DYE: CPT

## 2022-04-17 PROCEDURE — 86140 C-REACTIVE PROTEIN: CPT

## 2022-04-17 PROCEDURE — 85652 RBC SED RATE AUTOMATED: CPT

## 2022-04-17 PROCEDURE — 81001 URINALYSIS AUTO W/SCOPE: CPT

## 2022-04-17 PROCEDURE — 6370000000 HC RX 637 (ALT 250 FOR IP): Performed by: STUDENT IN AN ORGANIZED HEALTH CARE EDUCATION/TRAINING PROGRAM

## 2022-04-17 PROCEDURE — 96375 TX/PRO/DX INJ NEW DRUG ADDON: CPT

## 2022-04-17 PROCEDURE — 2580000003 HC RX 258: Performed by: STUDENT IN AN ORGANIZED HEALTH CARE EDUCATION/TRAINING PROGRAM

## 2022-04-17 PROCEDURE — 80053 COMPREHEN METABOLIC PANEL: CPT

## 2022-04-17 PROCEDURE — 82550 ASSAY OF CK (CPK): CPT

## 2022-04-17 PROCEDURE — 82803 BLOOD GASES ANY COMBINATION: CPT

## 2022-04-17 PROCEDURE — 1200000000 HC SEMI PRIVATE

## 2022-04-17 PROCEDURE — 96376 TX/PRO/DX INJ SAME DRUG ADON: CPT

## 2022-04-17 PROCEDURE — 6360000004 HC RX CONTRAST MEDICATION: Performed by: STUDENT IN AN ORGANIZED HEALTH CARE EDUCATION/TRAINING PROGRAM

## 2022-04-17 PROCEDURE — 96365 THER/PROPH/DIAG IV INF INIT: CPT

## 2022-04-17 PROCEDURE — 36415 COLL VENOUS BLD VENIPUNCTURE: CPT

## 2022-04-17 PROCEDURE — 84550 ASSAY OF BLOOD/URIC ACID: CPT

## 2022-04-17 PROCEDURE — 96361 HYDRATE IV INFUSION ADD-ON: CPT

## 2022-04-17 PROCEDURE — 6370000000 HC RX 637 (ALT 250 FOR IP): Performed by: EMERGENCY MEDICINE

## 2022-04-17 PROCEDURE — 87040 BLOOD CULTURE FOR BACTERIA: CPT

## 2022-04-17 RX ORDER — ONDANSETRON 2 MG/ML
4 INJECTION INTRAMUSCULAR; INTRAVENOUS EVERY 6 HOURS PRN
Status: DISCONTINUED | OUTPATIENT
Start: 2022-04-17 | End: 2022-04-19 | Stop reason: HOSPADM

## 2022-04-17 RX ORDER — SODIUM CHLORIDE 9 MG/ML
INJECTION, SOLUTION INTRAVENOUS PRN
Status: DISCONTINUED | OUTPATIENT
Start: 2022-04-17 | End: 2022-04-19 | Stop reason: HOSPADM

## 2022-04-17 RX ORDER — SODIUM CHLORIDE, SODIUM LACTATE, POTASSIUM CHLORIDE, AND CALCIUM CHLORIDE .6; .31; .03; .02 G/100ML; G/100ML; G/100ML; G/100ML
1000 INJECTION, SOLUTION INTRAVENOUS ONCE
Status: COMPLETED | OUTPATIENT
Start: 2022-04-17 | End: 2022-04-17

## 2022-04-17 RX ORDER — POLYETHYLENE GLYCOL 3350 17 G/17G
17 POWDER, FOR SOLUTION ORAL DAILY PRN
Status: DISCONTINUED | OUTPATIENT
Start: 2022-04-17 | End: 2022-04-19 | Stop reason: HOSPADM

## 2022-04-17 RX ORDER — ONDANSETRON 4 MG/1
4 TABLET, ORALLY DISINTEGRATING ORAL EVERY 8 HOURS PRN
Status: DISCONTINUED | OUTPATIENT
Start: 2022-04-17 | End: 2022-04-19 | Stop reason: HOSPADM

## 2022-04-17 RX ORDER — DEXTROSE MONOHYDRATE 100 MG/ML
12.5 INJECTION, SOLUTION INTRAVENOUS PRN
Status: DISCONTINUED | OUTPATIENT
Start: 2022-04-17 | End: 2022-04-19 | Stop reason: HOSPADM

## 2022-04-17 RX ORDER — ACETAMINOPHEN 325 MG/1
650 TABLET ORAL EVERY 6 HOURS PRN
Status: DISCONTINUED | OUTPATIENT
Start: 2022-04-17 | End: 2022-04-19 | Stop reason: HOSPADM

## 2022-04-17 RX ORDER — SODIUM CHLORIDE 0.9 % (FLUSH) 0.9 %
5-40 SYRINGE (ML) INJECTION EVERY 12 HOURS SCHEDULED
Status: DISCONTINUED | OUTPATIENT
Start: 2022-04-17 | End: 2022-04-19 | Stop reason: HOSPADM

## 2022-04-17 RX ORDER — DEXTROSE MONOHYDRATE 50 MG/ML
100 INJECTION, SOLUTION INTRAVENOUS PRN
Status: DISCONTINUED | OUTPATIENT
Start: 2022-04-17 | End: 2022-04-19 | Stop reason: HOSPADM

## 2022-04-17 RX ORDER — PREDNISONE 20 MG/1
40 TABLET ORAL DAILY
Status: DISCONTINUED | OUTPATIENT
Start: 2022-04-17 | End: 2022-04-19 | Stop reason: HOSPADM

## 2022-04-17 RX ORDER — DOXYCYCLINE 100 MG/1
100 CAPSULE ORAL ONCE
Status: DISCONTINUED | OUTPATIENT
Start: 2022-04-17 | End: 2022-04-17

## 2022-04-17 RX ORDER — SODIUM CHLORIDE 0.9 % (FLUSH) 0.9 %
5-40 SYRINGE (ML) INJECTION PRN
Status: DISCONTINUED | OUTPATIENT
Start: 2022-04-17 | End: 2022-04-19 | Stop reason: HOSPADM

## 2022-04-17 RX ORDER — HEPARIN SODIUM 5000 [USP'U]/ML
5000 INJECTION, SOLUTION INTRAVENOUS; SUBCUTANEOUS EVERY 8 HOURS SCHEDULED
Status: DISCONTINUED | OUTPATIENT
Start: 2022-04-17 | End: 2022-04-19 | Stop reason: HOSPADM

## 2022-04-17 RX ORDER — INDOMETHACIN 25 MG/1
50 CAPSULE ORAL ONCE
Status: COMPLETED | OUTPATIENT
Start: 2022-04-17 | End: 2022-04-17

## 2022-04-17 RX ORDER — ACETAMINOPHEN 650 MG/1
650 SUPPOSITORY RECTAL EVERY 6 HOURS PRN
Status: DISCONTINUED | OUTPATIENT
Start: 2022-04-17 | End: 2022-04-19 | Stop reason: HOSPADM

## 2022-04-17 RX ADMIN — HYDROMORPHONE HYDROCHLORIDE 0.5 MG: 1 INJECTION, SOLUTION INTRAMUSCULAR; INTRAVENOUS; SUBCUTANEOUS at 17:14

## 2022-04-17 RX ADMIN — INDOMETHACIN 50 MG: 25 CAPSULE ORAL at 20:41

## 2022-04-17 RX ADMIN — HYDROMORPHONE HYDROCHLORIDE 0.5 MG: 1 INJECTION, SOLUTION INTRAMUSCULAR; INTRAVENOUS; SUBCUTANEOUS at 19:03

## 2022-04-17 RX ADMIN — IOPAMIDOL 80 ML: 755 INJECTION, SOLUTION INTRAVENOUS at 19:31

## 2022-04-17 RX ADMIN — HYDROMORPHONE HYDROCHLORIDE 0.5 MG: 1 INJECTION, SOLUTION INTRAMUSCULAR; INTRAVENOUS; SUBCUTANEOUS at 22:15

## 2022-04-17 RX ADMIN — HEPARIN SODIUM 5000 UNITS: 5000 INJECTION INTRAVENOUS; SUBCUTANEOUS at 23:24

## 2022-04-17 RX ADMIN — PIPERACILLIN AND TAZOBACTAM 4500 MG: 4; .5 INJECTION, POWDER, LYOPHILIZED, FOR SOLUTION INTRAVENOUS at 19:02

## 2022-04-17 RX ADMIN — SODIUM CHLORIDE, POTASSIUM CHLORIDE, SODIUM LACTATE AND CALCIUM CHLORIDE 1000 ML: 600; 310; 30; 20 INJECTION, SOLUTION INTRAVENOUS at 17:14

## 2022-04-17 RX ADMIN — VANCOMYCIN HYDROCHLORIDE 1750 MG: 10 INJECTION, POWDER, LYOPHILIZED, FOR SOLUTION INTRAVENOUS at 19:53

## 2022-04-17 RX ADMIN — PREDNISONE 40 MG: 20 TABLET ORAL at 23:24

## 2022-04-17 ASSESSMENT — PAIN SCALES - GENERAL
PAINLEVEL_OUTOF10: 6
PAINLEVEL_OUTOF10: 10
PAINLEVEL_OUTOF10: 0
PAINLEVEL_OUTOF10: 6
PAINLEVEL_OUTOF10: 6
PAINLEVEL_OUTOF10: 10

## 2022-04-17 ASSESSMENT — ENCOUNTER SYMPTOMS
CHEST TIGHTNESS: 0
CHOKING: 0
BACK PAIN: 0
ABDOMINAL PAIN: 0
ANAL BLEEDING: 0
EYE REDNESS: 0
BLOOD IN STOOL: 0
VOMITING: 0
EYE ITCHING: 0
CONSTIPATION: 0
EYE PAIN: 0
COUGH: 0
EYE DISCHARGE: 0

## 2022-04-17 ASSESSMENT — PAIN DESCRIPTION - PAIN TYPE: TYPE: ACUTE PAIN

## 2022-04-17 ASSESSMENT — PAIN DESCRIPTION - ORIENTATION: ORIENTATION: RIGHT

## 2022-04-17 ASSESSMENT — PAIN DESCRIPTION - ONSET: ONSET: AWAKENED FROM SLEEP

## 2022-04-17 ASSESSMENT — PAIN DESCRIPTION - DESCRIPTORS: DESCRIPTORS: BURNING

## 2022-04-17 ASSESSMENT — PAIN DESCRIPTION - FREQUENCY: FREQUENCY: CONTINUOUS

## 2022-04-17 ASSESSMENT — PAIN DESCRIPTION - LOCATION: LOCATION: LEG

## 2022-04-17 NOTE — ED TRIAGE NOTES
Discharged from hospital on 4/16 for abscess on right thigh. Patient reached to MD.  MD suggested for patient to return to hospital to reevaluate right leg pain.

## 2022-04-17 NOTE — ED PROVIDER NOTES
[address] [facility]          EM RESIDENT NOTE       ED Attending Attestation Note     Date of evaluation: 4/17/2022    This patient was seen by the resident. I have seen and examined the patient, agree with the workup, evaluation, management and diagnosis. The care plan has been discussed. My assessment reveal is listed below in the ED Course. .    Critical Care:  Due to the immediate potential for life-threatening deterioration due to sepsis from cellulitis, I spent 45 minutes providing critical care. This time excludes time spent performing procedures but includes time spent on direct patient care, history retrieval, review of the chart, and discussions with patient, family, and consultant(s). Date of evaluation: 4/17/2022    Chief Complaint     Post-op Problem (Right inner thigh)      History of Present Illness     Jamie Sanford is a 56 y.o. female with past medical history of cardiomyopathy, gout who presents to the emergency department with right leg pain. Patient was recently admitted and underwent I&D of a right labial abscess yesterday and was discharged on pain control medications including oxycodone. She has not been on antibiotics in the outpatient setting. She reports that she was initially recovering well, but has developed severe pain in the area of the abscess in her inner right thigh. She also endorses severe pain and swelling of her right ankle and foot that started over the past several hours to the point she was unable to walk. She also endorses pain in her right calf. Denies fevers or chills. Denies dysuria or hematuria. Has not had a bowel movement since the surgery. Denies chest pain or difficulty breathing. Review of Systems     Please see HPI for pertinent positives and negatives. All other systems reviewed and negative.     Past Medical, Surgical, Family, and Social History     She has a past medical history of Allergic rhinitis, Virtual Telephone Check-In    Shira Valero verbally consents to a Virtual/Telephone Check-In visit on 08/17/20. Patient has been referred to the [facility] website at [email or web address] to review the yearly Consent to Treat document.     Patient Sonido Troy Cardiomyopathy ([address] Depression, Gout, and Hypertension. She has a past surgical history that includes Hysterectomy; cyst removal; Appendectomy; partial hysterectomy (cervix not removed); Colonoscopy (11/5/2021); and Leg Surgery (Right, 4/15/2022). Her family history includes Diabetes in her father, maternal grandfather, maternal grandmother, mother, and another family member; High Blood Pressure in an other family member; Hypertension in her maternal grandfather and maternal grandmother; Lupus in her sister; No Known Problems in her paternal grandfather and paternal grandmother; Other in an other family member; Rheum Arthritis in her mother and sister. She reports that she has been smoking cigarettes. She has a 6.20 pack-year smoking history. She has never used smokeless tobacco. She reports current alcohol use of about 2.0 standard drinks of alcohol per week. She reports that she does not use drugs. Medications     Current Discharge Medication List      CONTINUE these medications which have NOT CHANGED    Details   losartan-hydroCHLOROthiazide (HYZAAR) 100-25 MG per tablet TAKE 1 TABLET BY MOUTH ONE TIME A DAY  Qty: 90 tablet, Refills: 1      ibuprofen (ADVIL;MOTRIN) 600 MG tablet Take 1 tablet by mouth every 6 hours as needed for Pain  Qty: 30 tablet, Refills: 0      vitamin D (ERGOCALCIFEROL) 1.25 MG (59494 UT) CAPS capsule Take 1 capsule by mouth once a week  Qty: 4 capsule, Refills: 5      albuterol sulfate HFA (VENTOLIN HFA) 108 (90 Base) MCG/ACT inhaler Inhale 2 puffs into the lungs 4 times daily as needed for Wheezing  Qty: 3 Inhaler, Refills: 1      cetirizine (ZYRTEC) 10 MG tablet Take 10 mg by mouth daily As needed             Allergies     She is allergic to lisinopril. Physical Exam     INITIAL VITALS: BP: 124/77, Temp: 96.6 °F (35.9 °C), Pulse: 103, Resp: 16, SpO2: 96 %   Physical Exam  Constitutional:       Appearance: She is not diaphoretic. HENT:      Head: Normocephalic and atraumatic. incontinence  NEURO: denies headaches, denies dizziness; denies numbness or tingling    EXAM:   [facility] 07/20/2020   ENT: no hyponasal tone, no sneezing  LUNGS: speaking in full sentences; no cough; no audible wheeze  PSYCH: A&Ox3;  judgement and insight are a Nose: Nose normal.      Mouth/Throat:      Mouth: Mucous membranes are moist.   Eyes:      Extraocular Movements: Extraocular movements intact. Pupils: Pupils are equal, round, and reactive to light. Cardiovascular:      Rate and Rhythm: Regular rhythm. Tachycardia present. Pulses: Normal pulses. Pulmonary:      Effort: Pulmonary effort is normal. No respiratory distress. Breath sounds: Normal breath sounds. Abdominal:      General: There is no distension. Palpations: Abdomen is soft. Tenderness: There is no abdominal tenderness. There is no guarding. Genitourinary:     Comments: Scant purulent drainage noted on bandage overlying surgical opening in the right groin. Erythema and significant tenderness to palpation noted extending to the medial thigh and towards the right perineum and buttock. Musculoskeletal:      Cervical back: Neck supple. Skin:     General: Skin is warm and dry. Capillary Refill: Capillary refill takes less than 2 seconds. Comments: Significant tenderness to palpation over the right foot and ankle as well as swelling and warmth on palpation. Reports decreased range of motion secondary to pain of the foot and ankle. Able to wiggle toes slightly. Intact distal pulses with good cap refill. No tenderness to palpation over the distal upper leg, knee area, or proximal calf. Neurological:      General: No focal deficit present. Mental Status: She is alert. DiagnosticResults     EKG   None performed. RADIOLOGY:  XR FOOT RIGHT (MIN 3 VIEWS)   Final Result   1. Mild first MTP osteoarthritis. 2. Equivocal erosion along the medial head of the first metatarsal which could reflect early gout in the proper clinical setting. CT PELVIS W CONTRAST Additional Contrast? None   Final Result      1. Post-surgical changes of right labial incision and drainage. No evidence of residual abscess.    2.  Cellulitis along the proximal medial right thigh.              LABS:   Results for orders placed or performed during the hospital encounter of 04/17/22   Culture, Wound    Specimen: Groin   Result Value Ref Range    Gram Stain Result 2+ WBC's (Polymorphonuclear)  No organisms seen      CBC with Auto Differential   Result Value Ref Range    WBC 11.0 4.0 - 11.0 K/uL    RBC 4.49 4.00 - 5.20 M/uL    Hemoglobin 13.0 12.0 - 16.0 g/dL    Hematocrit 41.0 36.0 - 48.0 %    MCV 91.4 80.0 - 100.0 fL    MCH 29.1 26.0 - 34.0 pg    MCHC 31.8 31.0 - 36.0 g/dL    RDW 13.5 12.4 - 15.4 %    Platelets 171 011 - 515 K/uL    MPV 9.9 5.0 - 10.5 fL    Neutrophils % 63.8 %    Lymphocytes % 26.4 %    Monocytes % 8.4 %    Eosinophils % 1.2 %    Basophils % 0.2 %    Neutrophils Absolute 7.0 1.7 - 7.7 K/uL    Lymphocytes Absolute 2.9 1.0 - 5.1 K/uL    Monocytes Absolute 0.9 0.0 - 1.3 K/uL    Eosinophils Absolute 0.1 0.0 - 0.6 K/uL    Basophils Absolute 0.0 0.0 - 0.2 K/uL   Comprehensive Metabolic Panel w/ Reflex to MG   Result Value Ref Range    Sodium 136 136 - 145 mmol/L    Potassium reflex Magnesium 4.4 3.5 - 5.1 mmol/L    Chloride 98 (L) 99 - 110 mmol/L    CO2 20 (L) 21 - 32 mmol/L    Anion Gap 18 (H) 3 - 16    Glucose 111 (H) 70 - 99 mg/dL    BUN 9 7 - 20 mg/dL    CREATININE 1.1 0.6 - 1.1 mg/dL    GFR Non-African American 52 (A) >60    GFR African American >60 >60    Calcium 9.6 8.3 - 10.6 mg/dL    Total Protein 7.4 6.4 - 8.2 g/dL    Albumin 4.1 3.4 - 5.0 g/dL    Albumin/Globulin Ratio 1.2 1.1 - 2.2    Total Bilirubin 0.7 0.0 - 1.0 mg/dL    Alkaline Phosphatase 99 40 - 129 U/L    ALT 14 10 - 40 U/L    AST 15 15 - 37 U/L   CK   Result Value Ref Range    Total CK 96 26 - 192 U/L   Urinalysis with Microscopic   Result Value Ref Range    Color, UA Yellow Straw/Yellow    Clarity, UA Clear Clear    Glucose, Ur Negative Negative mg/dL    Bilirubin Urine Negative Negative    Ketones, Urine Negative Negative mg/dL    Specific Gravity, UA 1.010 1.005 - 1.030    Blood, Urine Negative Negative    pH, UA 6.0 5.0 - 8.0    Protein, UA 30 (A) Negative mg/dL    Urobilinogen, Urine 0.2 <2.0 E.U./dL    Nitrite, Urine Negative Negative    Leukocyte Esterase, Urine Negative Negative    Microscopic Examination YES     Urine Type NotGiven     WBC, UA 0-2 0 - 5 /HPF    RBC, UA 0-2 0 - 4 /HPF    Epithelial Cells, UA 2-5 0 - 5 /HPF   HCG Qualitative, Serum   Result Value Ref Range    hCG Qual Negative Detects HCG level >10 MIU/mL   C-Reactive Protein   Result Value Ref Range    .1 (H) 0.0 - 5.1 mg/L   Sedimentation Rate   Result Value Ref Range    Sed Rate 47 (H) 0 - 30 mm/Hr   Lactic Acid   Result Value Ref Range    Lactic Acid 1.8 0.4 - 2.0 mmol/L   Blood Gas, Venous   Result Value Ref Range    pH, Amadou 7.294 (L) 7.350 - 7.450    pCO2, Amadou 57.3 (H) 41.0 - 51.0 mmHg    pO2, Amadou <30.0 25.0 - 40.0 mmHg    HCO3, Venous 27.8 24.0 - 28.0 mmol/L    Base Excess, Amadou 0.4 -2.0 - 3.0 mmol/L    O2 Sat, Amadou 22 Not established %    Carboxyhemoglobin 4.0 (H) 0.0 - 1.5 %    MetHgb, Amadou 0.6 0.0 - 1.5 %    TC02 (Calc), Amadou 30 mmol/L    Hemoglobin, Amadou, Reduced 74.30 %   CBC with Auto Differential   Result Value Ref Range    WBC 9.2 4.0 - 11.0 K/uL    RBC 3.40 (L) 4.00 - 5.20 M/uL    Hemoglobin 10.0 (L) 12.0 - 16.0 g/dL    Hematocrit 30.8 (L) 36.0 - 48.0 %    MCV 90.7 80.0 - 100.0 fL    MCH 29.5 26.0 - 34.0 pg    MCHC 32.6 31.0 - 36.0 g/dL    RDW 13.5 12.4 - 15.4 %    Platelets 201 328 - 996 K/uL    MPV 10.4 5.0 - 10.5 fL    Neutrophils % 84.0 %    Lymphocytes % 12.4 %    Monocytes % 3.2 %    Eosinophils % 0.3 %    Basophils % 0.1 %    Neutrophils Absolute 7.7 1.7 - 7.7 K/uL    Lymphocytes Absolute 1.1 1.0 - 5.1 K/uL    Monocytes Absolute 0.3 0.0 - 1.3 K/uL    Eosinophils Absolute 0.0 0.0 - 0.6 K/uL    Basophils Absolute 0.0 0.0 - 0.2 K/uL   Renal Function Panel   Result Value Ref Range    Sodium 138 136 - 145 mmol/L    Potassium 4.6 3.5 - 5.1 mmol/L    Chloride 104 99 - 110 mmol/L    CO2 21 21 - 32 mmol/L    Anion Gap 13 3 - 16    Glucose 138 (H) 70 - 99 mg/dL    BUN 7 7 - 20 mg/dL    CREATININE 1.1 0.6 - 1.1 mg/dL    GFR Non-African American 52 (A) >60    GFR African American >60 >60    Calcium 8.9 8.3 - 10.6 mg/dL    Phosphorus 3.1 2.5 - 4.9 mg/dL    Albumin 3.7 3.4 - 5.0 g/dL   Magnesium   Result Value Ref Range    Magnesium 1.40 (L) 1.80 - 2.40 mg/dL   Uric Acid   Result Value Ref Range    Uric Acid, Serum 7.7 (H) 2.6 - 6.0 mg/dL   Lactic Acid   Result Value Ref Range    Lactic Acid 3.7 (H) 0.4 - 2.0 mmol/L   Lactic Acid   Result Value Ref Range    Lactic Acid 1.1 0.4 - 2.0 mmol/L       ED BEDSIDE ULTRASOUND:  None    RECENT VITALS:  BP: 117/75, Temp: 97.9 °F (36.6 °C), Pulse: 96,Resp: 16, SpO2: 96 %     Procedures     None    ED Course     ED Course as of 04/18/22 1712   Sun Apr 17, 2022   1603 Attending note: 57 yo F with h/o abscess s/p I/D last evening with Gen Surg, d/c this AM. No abx. [NM]   3122 Gen Surg team at bedside, they will staff patient and discuss further imaging with attending. [NM]   0406 Given persistent pain of the right groin with erythema present, heart rate now up to 114 and patient having mild rigors, will obtain CT of the right pelvis/groin to the right lower extremity. Plan for starting Abx as well [NM]   1904 , BP stable. States pain tolerable at this time. Spoke with surgery resident regarding plan of obtaining CT of right groin and distally as well as starting abx. [NM]   1912 1/25/21 ECHO: Summary   Left ventricular cavity size is normal with normal left ventricular wall   thickness. Overall left ventricular systolic function appears mildly reduced. Ejection fraction is visually estimated to be 50%. There is mild diffuse hypokinesis. Grade I diastolic dysfunction with normal LV filling pressures. Mitral valve leaflets appear mildly thickened. Mild mitral annular calcification. Mild mitral regurgitation. Aortic valve appears sclerotic but opens adequately.    Trivial capsule 100 mg     Order Specific Question:   Antimicrobial Indications     Answer:   Skin and Soft Tissue Infection    iopamidol (ISOVUE-370) 76 % injection 80 mL    vancomycin (VANCOCIN) 1,750 mg in dextrose 5 % 500 mL IVPB     Order Specific Question:   Antimicrobial Indications     Answer:   Skin and Soft Tissue Infection    piperacillin-tazobactam (ZOSYN) 4,500 mg in dextrose 5 % 100 mL IVPB (mini-bag)     Order Specific Question:   Antimicrobial Indications     Answer:   Intra-Abdominal Infection    HYDROmorphone (DILAUDID) injection 0.5 mg    indomethacin (INDOCIN) capsule 50 mg    predniSONE (DELTASONE) tablet 40 mg    sodium chloride flush 0.9 % injection 5-40 mL    sodium chloride flush 0.9 % injection 5-40 mL    0.9 % sodium chloride infusion    OR Linked Order Group     ondansetron (ZOFRAN-ODT) disintegrating tablet 4 mg     ondansetron (ZOFRAN) injection 4 mg    polyethylene glycol (GLYCOLAX) packet 17 g    OR Linked Order Group     acetaminophen (TYLENOL) tablet 650 mg     acetaminophen (TYLENOL) suppository 650 mg    heparin (porcine) injection 5,000 Units    glucose chewable tablet 4 each    dextrose 10 % infusion    glucagon (rDNA) injection 1 mg    dextrose 5 % solution    OR Linked Order Group     HYDROmorphone (DILAUDID) injection 0.25 mg     HYDROmorphone (DILAUDID) injection 0.5 mg    0.9 % sodium chloride bolus    DISCONTD: piperacillin-tazobactam (ZOSYN) 3,375 mg in dextrose 5 % 50 mL IVPB extended infusion (mini-bag)     Order Specific Question:   Antimicrobial Indications     Answer:   Skin and Soft Tissue Infection    DISCONTD: vancomycin (VANCOCIN) 1,000 mg in dextrose 5 % 250 mL IVPB     Order Specific Question:   Antimicrobial Indications     Answer:   Skin and Soft Tissue Infection    magnesium sulfate 2000 mg in 50 mL IVPB premix    benzonatate (TESSALON) capsule 100 mg    sulfamethoxazole-trimethoprim (BACTRIM DS;SEPTRA DS) 800-160 MG per tablet 1 tablet Order Specific Question:   Antimicrobial Indications     Answer:   Skin and Soft Tissue Infection    colchicine (COLCRYS) tablet 0.6 mg       CONSULTS:  IP CONSULT TO GENERAL SURGERY  IP CONSULT TO HOSPITALIST  IP CONSULT TO [address] [place] [address] TO [id number] Tiffanie Granda / ASSESSMENT / Bernardino Olman is a 56 y.o. female with past medical history of recent I&D of right labial abscess within the past 24 hours who presents with increasing pain and swelling near the site of the incision as well as distally in her right lower extremity. She has significant tenderness to palpation near the site of the I&D extending toward the perineum which could be concerning for necrotizing fasciitis or Sabrina's gangrene given progression of symptoms and exquisite tenderness to palpation. She additionally has swelling and  Significant pain of the distal right lower extremity which is not contiguous with this area of infection on physical examination, however necrotizing infection is again considered. DVT is also a consideration in the setting of her recent surgery, though her foot and ankle tenderness is less suggestive of this process. She has a history of gout and has erythema and tenderness of the first MTP joint, but her extension of swelling and pain is less typical of this condition. She is mildly tachycardic on examination but otherwise afebrile and overall nontoxic but significantly tender as above. General surgery is consulted to assist with imaging and antibiotic choice. Patient is given a dose of Dilaudid as well as IV fluids. Patient has persistent pain and significant tenderness for which CT imaging broad-spectrum antibiotics are ordered. Laboratory studies demonstrate mild acidosis on renal panel and respiratory acidosis on VBG. Lactate is not elevated. White blood cell count appears stable and hemoglobin is stable postoperatively. CRP is elevated. CK is not elevated. LFTs are unremarkable. Urinalysis is without evidence of infection. CT scan, pain control, reassessment are pending at time of signout. Patient may require admission for further monitoring and pain control given her initial examination and continued pain. Disposition pending results of the above studies. This patient was also evaluated by the attending physician. All care plans werediscussed and agreed upon. Clinical Impression     1. Right thigh pain    2. Cellulitis of right thigh    3. Tachycardia    4. Acute gout involving toe of right foot, unspecified cause        Disposition     PATIENT REFERRED TO:  No follow-up provider specified.     DISCHARGE MEDICATIONS:  Current Discharge Medication List          Cosme Mc MD  Resident  04/17/22 1400 Celestine Thorne MD  04/18/22 [id number]

## 2022-04-17 NOTE — CONSULTS
General Surgery   Resident Consult Note    Reason for Consult: Persistent RLE pain    History of Present Illness: Delfina Stout is a 56 y.o. female with history of cardiomyopathy, hypertension, and gout who presents to [facility] ED with worsening RLE pain. She recently developed a R inferomedial buttock abscess and underwent I&D at the [facility] ED on 3/27, followed by further I&D in the [address] [place] [address] on 4/15 before being discharged home the following day. She reports doing well that afternoon, but overnight, she notes continued purulent drainage from her wound, as well as worsening pain at the I&D site, such that she was unable to tolerate weight on her R leg or sit down. She also noted pain in her R foot that has worsened over the past day as well, as well as associated erythema. Given her worsening pain, she presented to the ED. She denies any other new-onset symptoms, including fevers, chills, or preceding trauma. Past Medical History:        Diagnosis Date    Allergic rhinitis     Cardiomy[address] ([facility] [address]     Depression     Gout     Hypertension        Past Surgical History:        Procedure Laterality Date    APPENDECTOMY      COLONOSCOPY  11/5/2021    COLONOSCOPY POLYPECTOMY SNARE/COLD BIOPSY performed by Anastasiia Ramsey MD at [address]      neck    HYSTERECTOMY      PARTIAL HYSTERECTOMY         Allergies:  Lisinopril    Medications:   Home Meds  No current facility-administered medications on file prior to encounter. Current Outpatient Medications on File Prior to Encounter   Medication Sig Dispense Refill    oxyCODONE-acetaminophen (PERCOCET) 5-325 MG per tablet Take 1 tablet by mouth every 6 hours as needed for Pain for up to 7 days. Intended supply: 7 days.  Take lowest dose possible to manage pain 28 tablet 0    losartan-hydroCHLOROthiazide (HYZAAR) 100-25 MG per tablet TAKE 1 TABLET BY MOUTH ONE TIME A DAY 90 tablet 1    ibuprofen (ADVIL;MOTRIN) 600 MG tablet Take 1 tablet by mouth every 6 hours as needed for Pain 30 tablet 0    vitamin D (ERGOCALCIFEROL) 1.25 MG (10467 UT) CAPS capsule Take 1 capsule by mouth once a week 4 capsule 5    colchicine (COLCRYS) 0.6 MG tablet Take 1 tablet by mouth daily 1.2 mg at the first sign of flare, followed in 1 hour with a single dose of 0.6 mg max of 1.8 mg daily 30 tablet 3    albuterol sulfate HFA (VENTOLIN HFA) 108 (90 Base) MCG/ACT inhaler Inhale 2 puffs into the lungs 4 times daily as needed for Wheezing 3 Inhaler 1    cetirizine (ZYRTEC) 10 MG tablet Take 10 mg by mouth daily As needed         Current Meds  iopamidol (ISOVUE-370) 76 % injection 80 mL, ONCE PRN  vancomycin (VANCOCIN) 1,750 mg in dextrose 5 % 500 mL IVPB, Once  piperacillin-tazobactam (ZOSYN) 4,500 mg in dextrose 5 % 100 mL IVPB (mini-bag), Once  HYDROmorphone (DILAUDID) injection 0.5 mg, Once        Family History:   Family History   Problem Relation Age of Onset    Diabetes Mother     Rheum Arthritis Mother     Diabetes Father     Diabetes Other     High Blood Pressure Other     Other Other         CVA    Lupus Sister     Rheum Arthritis Sister     Diabetes Maternal Grandmother     Hypertension Maternal Grandmother     Diabetes Maternal Grandfather     Hypertension Maternal Grandfather     No Known Problems Paternal Grandmother     No Known Problems Paternal Grandfather     Heart Disease Neg Hx        Social History:   TOBACCO:   reports that she has been smoking cigarettes. She has a 6.20 pack-year smoking history. She has never used smokeless tobacco.  ETOH:   reports current alcohol use of about 2.0 standard drinks of alcohol per week. DRUGS:   reports no history of drug use. Review of Systems:     Constitutional: Negative. HENT: Negative. Eyes: Negative. Respiratory: Negative. Cardiovascular: Negative. Gastrointestinal: Negative. Genitourinary: Negative. Musculoskeletal: Negative.    Skin: Negative except for wound, erythema, pain. Endocrine: Negative. Allergic/Immunologic: Negative. Neurological: Negative. Hematological: Negative. Psychiatric/Behavioral: Negative. Physical exam:    Vitals:    04/17/22 1504   BP: 124/77   Pulse: 103   Resp: 16   Temp: 96.6 °F (35.9 °C)   TempSrc: Oral   SpO2: 96%       General appearance: Alert, appears uncomfortable, grooming appropriate  HEENT: Normocephalic, atraumatic; EOMI  Neck: Trachea midline, no JVD  Chest/Lungs: Normal inspiratory effort, symmetric chest rise, no accessory muscle use  Cardiovascular: Tachycardic, regular rhythm; perfusing extremities  Abdomen: Soft, non-tender, non-distended, no guarding/rigidity  Extremities: R medial buttock wound significantly tender with scant purulent drainage with minimal surrounding erythema, no crepitus or evidence of necrosis; erythema present on the dorsal surface of the R MTP joint; entire R foot initially tender to palpation but easily distractible to where only tenderness involves the distal medial foot and 1st MTP joint  Neuro: A&Ox3, no gross sensory or motor neuro deficits    Labs:    CBC:   Recent Labs     04/16/22  0628 04/17/22  1823   WBC 11.1* 11.0   HGB 10.6* 13.0   HCT 33.0* 41.0   MCV 91.1 91.4    178     BMP:   Recent Labs     04/16/22  0628 04/17/22  1704    136   K 4.0 4.4    98*   CO2 22 20*   PHOS 4.2  --    BUN 12 9   CREATININE 1.0 1.1     Liver Profile:   Lab Results   Component Value Date    AST 15 04/17/2022    ALT 14 04/17/2022    BILITOT 0.7 04/17/2022    ALKPHOS 99 04/17/2022     Lab Results   Component Value Date    HDL 48 03/14/2022     PT/INR: No results for input(s): PROTIME, INR in the last 72 hours. Imaging:   CT PELVIS W CONTRAST Additional Contrast? None    (Results Pending)         Assessment/Plan:   Kelechi Haley is a 56 y.o. female with history of history of cardiomyopathy, hypertension, and gout who presents to [facility] ED with continuing pain related to her R buttock/thigh abscess (I&D POD #2). Patient states pain is worsening but is distractible on exam and previous abscess is actively draining seropurulent fluid with no subcutaneous air felt. There is no need for acute surgical intervention at this time. - Will likely need admission to medicine team given likely gout flair with uncontrolled pain and further management of medical comorbidities   - Patient with stable labs and normal vitals signs outside of mild tachycardia. Suspicion of necrotizing infection is low as wound was recently assessed in OR and had adequate drainage with no signs of necrotizing infection.  - Pain in lower extremity is likely from gout flare given tophi on right great toe, patient has no contiguous pain on palpation from thigh to lower foot. Pain from abscess site is likely unrelated to foot pain.   - CT scan per ED, will follow up results  - Recommend antibiotics  - General surgery will follow while inpatient  - Patient discussed with staff    Shade Lopez [id number]  General Surgery  04/17/22  6:58 PM  [phone]

## 2022-04-18 ENCOUNTER — APPOINTMENT (OUTPATIENT)
Dept: GENERAL RADIOLOGY | Age: 57
DRG: 863 | End: 2022-04-18
Payer: COMMERCIAL

## 2022-04-18 LAB
ALBUMIN SERPL-MCNC: 3.7 G/DL (ref 3.4–5)
ANION GAP SERPL CALCULATED.3IONS-SCNC: 13 MMOL/L (ref 3–16)
BASOPHILS ABSOLUTE: 0 K/UL (ref 0–0.2)
BASOPHILS RELATIVE PERCENT: 0.1 %
BUN BLDV-MCNC: 7 MG/DL (ref 7–20)
CALCIUM SERPL-MCNC: 8.9 MG/DL (ref 8.3–10.6)
CHLORIDE BLD-SCNC: 104 MMOL/L (ref 99–110)
CO2: 21 MMOL/L (ref 21–32)
CREAT SERPL-MCNC: 1.1 MG/DL (ref 0.6–1.1)
EOSINOPHILS ABSOLUTE: 0 K/UL (ref 0–0.6)
EOSINOPHILS RELATIVE PERCENT: 0.3 %
GFR AFRICAN AMERICAN: >60
GFR NON-AFRICAN AMERICAN: 52
GLUCOSE BLD-MCNC: 138 MG/DL (ref 70–99)
HCT VFR BLD CALC: 30.8 % (ref 36–48)
HEMOGLOBIN: 10 G/DL (ref 12–16)
LACTIC ACID: 1.1 MMOL/L (ref 0.4–2)
LACTIC ACID: 3.7 MMOL/L (ref 0.4–2)
LYMPHOCYTES ABSOLUTE: 1.1 K/UL (ref 1–5.1)
LYMPHOCYTES RELATIVE PERCENT: 12.4 %
MAGNESIUM: 1.4 MG/DL (ref 1.8–2.4)
MCH RBC QN AUTO: 29.5 PG (ref 26–34)
MCHC RBC AUTO-ENTMCNC: 32.6 G/DL (ref 31–36)
MCV RBC AUTO: 90.7 FL (ref 80–100)
MONOCYTES ABSOLUTE: 0.3 K/UL (ref 0–1.3)
MONOCYTES RELATIVE PERCENT: 3.2 %
NEUTROPHILS ABSOLUTE: 7.7 K/UL (ref 1.7–7.7)
NEUTROPHILS RELATIVE PERCENT: 84 %
PDW BLD-RTO: 13.5 % (ref 12.4–15.4)
PHOSPHORUS: 3.1 MG/DL (ref 2.5–4.9)
PLATELET # BLD: 199 K/UL (ref 135–450)
PMV BLD AUTO: 10.4 FL (ref 5–10.5)
POTASSIUM SERPL-SCNC: 4.6 MMOL/L (ref 3.5–5.1)
RBC # BLD: 3.4 M/UL (ref 4–5.2)
SODIUM BLD-SCNC: 138 MMOL/L (ref 136–145)
WBC # BLD: 9.2 K/UL (ref 4–11)

## 2022-04-18 PROCEDURE — 6360000002 HC RX W HCPCS: Performed by: STUDENT IN AN ORGANIZED HEALTH CARE EDUCATION/TRAINING PROGRAM

## 2022-04-18 PROCEDURE — 6370000000 HC RX 637 (ALT 250 FOR IP): Performed by: STUDENT IN AN ORGANIZED HEALTH CARE EDUCATION/TRAINING PROGRAM

## 2022-04-18 PROCEDURE — 36415 COLL VENOUS BLD VENIPUNCTURE: CPT

## 2022-04-18 PROCEDURE — 73630 X-RAY EXAM OF FOOT: CPT

## 2022-04-18 PROCEDURE — 83735 ASSAY OF MAGNESIUM: CPT

## 2022-04-18 PROCEDURE — 1200000000 HC SEMI PRIVATE

## 2022-04-18 PROCEDURE — 85025 COMPLETE CBC W/AUTO DIFF WBC: CPT

## 2022-04-18 PROCEDURE — 6370000000 HC RX 637 (ALT 250 FOR IP): Performed by: INTERNAL MEDICINE

## 2022-04-18 PROCEDURE — 80069 RENAL FUNCTION PANEL: CPT

## 2022-04-18 PROCEDURE — 99024 POSTOP FOLLOW-UP VISIT: CPT | Performed by: SURGERY

## 2022-04-18 PROCEDURE — 83605 ASSAY OF LACTIC ACID: CPT

## 2022-04-18 PROCEDURE — 87070 CULTURE OTHR SPECIMN AEROBIC: CPT

## 2022-04-18 PROCEDURE — 87205 SMEAR GRAM STAIN: CPT

## 2022-04-18 PROCEDURE — 2580000003 HC RX 258: Performed by: STUDENT IN AN ORGANIZED HEALTH CARE EDUCATION/TRAINING PROGRAM

## 2022-04-18 RX ORDER — MAGNESIUM SULFATE IN WATER 40 MG/ML
2000 INJECTION, SOLUTION INTRAVENOUS ONCE
Status: COMPLETED | OUTPATIENT
Start: 2022-04-18 | End: 2022-04-18

## 2022-04-18 RX ORDER — SULFAMETHOXAZOLE AND TRIMETHOPRIM 800; 160 MG/1; MG/1
1 TABLET ORAL EVERY 12 HOURS SCHEDULED
Status: DISCONTINUED | OUTPATIENT
Start: 2022-04-18 | End: 2022-04-19 | Stop reason: HOSPADM

## 2022-04-18 RX ORDER — COLCHICINE 0.6 MG/1
0.6 TABLET ORAL DAILY
Status: DISCONTINUED | OUTPATIENT
Start: 2022-04-18 | End: 2022-04-19 | Stop reason: HOSPADM

## 2022-04-18 RX ORDER — BENZONATATE 100 MG/1
100 CAPSULE ORAL 3 TIMES DAILY PRN
Status: DISCONTINUED | OUTPATIENT
Start: 2022-04-18 | End: 2022-04-19 | Stop reason: HOSPADM

## 2022-04-18 RX ORDER — 0.9 % SODIUM CHLORIDE 0.9 %
2000 INTRAVENOUS SOLUTION INTRAVENOUS ONCE
Status: COMPLETED | OUTPATIENT
Start: 2022-04-18 | End: 2022-04-18

## 2022-04-18 RX ADMIN — SULFAMETHOXAZOLE AND TRIMETHOPRIM 1 TABLET: 800; 160 TABLET ORAL at 21:44

## 2022-04-18 RX ADMIN — PIPERACILLIN AND TAZOBACTAM 3375 MG: 3; .375 INJECTION, POWDER, LYOPHILIZED, FOR SOLUTION INTRAVENOUS at 05:22

## 2022-04-18 RX ADMIN — PREDNISONE 40 MG: 20 TABLET ORAL at 09:06

## 2022-04-18 RX ADMIN — SODIUM CHLORIDE 15 ML/HR: 9 INJECTION, SOLUTION INTRAVENOUS at 09:02

## 2022-04-18 RX ADMIN — SULFAMETHOXAZOLE AND TRIMETHOPRIM 1 TABLET: 800; 160 TABLET ORAL at 11:42

## 2022-04-18 RX ADMIN — HEPARIN SODIUM 5000 UNITS: 5000 INJECTION INTRAVENOUS; SUBCUTANEOUS at 05:26

## 2022-04-18 RX ADMIN — COLCHICINE 0.6 MG: 0.6 TABLET, FILM COATED ORAL at 15:59

## 2022-04-18 RX ADMIN — HYDROMORPHONE HYDROCHLORIDE 0.5 MG: 1 INJECTION, SOLUTION INTRAMUSCULAR; INTRAVENOUS; SUBCUTANEOUS at 02:06

## 2022-04-18 RX ADMIN — HEPARIN SODIUM 5000 UNITS: 5000 INJECTION INTRAVENOUS; SUBCUTANEOUS at 21:44

## 2022-04-18 RX ADMIN — SODIUM CHLORIDE, PRESERVATIVE FREE 10 ML: 5 INJECTION INTRAVENOUS at 09:06

## 2022-04-18 RX ADMIN — HEPARIN SODIUM 5000 UNITS: 5000 INJECTION INTRAVENOUS; SUBCUTANEOUS at 15:59

## 2022-04-18 RX ADMIN — MAGNESIUM SULFATE HEPTAHYDRATE 2000 MG: 2 INJECTION, SOLUTION INTRAVENOUS at 09:05

## 2022-04-18 RX ADMIN — HYDROMORPHONE HYDROCHLORIDE 0.5 MG: 1 INJECTION, SOLUTION INTRAMUSCULAR; INTRAVENOUS; SUBCUTANEOUS at 08:50

## 2022-04-18 RX ADMIN — HYDROMORPHONE HYDROCHLORIDE 0.5 MG: 1 INJECTION, SOLUTION INTRAMUSCULAR; INTRAVENOUS; SUBCUTANEOUS at 05:19

## 2022-04-18 RX ADMIN — HYDROMORPHONE HYDROCHLORIDE 0.5 MG: 1 INJECTION, SOLUTION INTRAMUSCULAR; INTRAVENOUS; SUBCUTANEOUS at 18:48

## 2022-04-18 RX ADMIN — SODIUM CHLORIDE, PRESERVATIVE FREE 10 ML: 5 INJECTION INTRAVENOUS at 21:00

## 2022-04-18 RX ADMIN — SODIUM CHLORIDE 2000 ML: 9 INJECTION, SOLUTION INTRAVENOUS at 02:10

## 2022-04-18 ASSESSMENT — PAIN DESCRIPTION - DESCRIPTORS
DESCRIPTORS: THROBBING
DESCRIPTORS: CONSTANT;THROBBING

## 2022-04-18 ASSESSMENT — PAIN DESCRIPTION - PROGRESSION
CLINICAL_PROGRESSION: NOT CHANGED
CLINICAL_PROGRESSION: NOT CHANGED

## 2022-04-18 ASSESSMENT — PAIN SCALES - GENERAL
PAINLEVEL_OUTOF10: 6
PAINLEVEL_OUTOF10: 7
PAINLEVEL_OUTOF10: 7
PAINLEVEL_OUTOF10: 0
PAINLEVEL_OUTOF10: 2
PAINLEVEL_OUTOF10: 0

## 2022-04-18 ASSESSMENT — PAIN DESCRIPTION - ONSET
ONSET: ON-GOING
ONSET: ON-GOING

## 2022-04-18 ASSESSMENT — PAIN DESCRIPTION - PAIN TYPE
TYPE: ACUTE PAIN
TYPE: ACUTE PAIN

## 2022-04-18 ASSESSMENT — PAIN DESCRIPTION - FREQUENCY
FREQUENCY: CONTINUOUS
FREQUENCY: CONTINUOUS

## 2022-04-18 ASSESSMENT — PAIN DESCRIPTION - LOCATION
LOCATION: GROIN
LOCATION: GROIN

## 2022-04-18 ASSESSMENT — PAIN DESCRIPTION - ORIENTATION
ORIENTATION: RIGHT
ORIENTATION: RIGHT

## 2022-04-18 ASSESSMENT — PAIN DESCRIPTION - DIRECTION: RADIATING_TOWARDS: RIGHT LEG AND FOOT

## 2022-04-18 ASSESSMENT — PAIN - FUNCTIONAL ASSESSMENT: PAIN_FUNCTIONAL_ASSESSMENT: PREVENTS OR INTERFERES SOME ACTIVE ACTIVITIES AND ADLS

## 2022-04-18 NOTE — CARE COORDINATION
Case Management Assessment           Initial Evaluation                Date / Time of Evaluation: 4/18/2022 1:31 PM                 Assessment Completed by: GARCIA Mir, LSW    Patient Name: Kwame Jade     YOB: 1965  Diagnosis: Cellulitis and abscess of buttock [L02.31, L03.317]  Cellulitis [L03.90]  Tachycardia [R00.0]  Right thigh pain [M79.651]  Cellulitis of right thigh [J97.902]  Acute gout involving toe of right foot, unspecified cause [M10.9]     Date / Time: 4/17/2022  3:20 PM    Patient Admission Status: Inpatient    If patient is discharged prior to next notation, then this note serves as note for discharge by case management.      Current PCP: FERNANDO Sierra - CNP  Clinic Patient: No    Chart Reviewed: Yes  Patient/ Family Interviewed: Yes    Initial assessment completed at bedside with: patient    Hospitalization in the last 30 days: Yes    Emergency Contacts:  Extended Emergency Contact Information  Primary Emergency Contact: [address] [facility] Phone: [phone]  Mobile Phone: [phone]  Relation: Other  Secondary Emergency Contact: [facility] Phone: [phone]  Relation: Brother/Sister    Advance Directives:   Code Status: Full [address]: No    Financial  Payor: Thuy Benoit / Plan: [address] [place] [address] / Product Type: *No Product type* /     Pre-cert required for SNF: Yes    Pharmacy    [address] , [facility] 20, [address]  [address] [place] [address]  [address] [facility] [address]  Phone: [phone] Fax: [phone]    [facility] [id number] [facility], [address] [place] [address] [phone] [facility] [phone] [address] [facility] [address] [address]  Phone: [phone] Fax: [phone]      Potential assistance Purchasing Medications: Potential Assistance Purchasing Medications: No  Does Patient want to participate in local refill/ meds to beds program?: Yes    Meds To Beds General Rules:  1. Can ONLY be done Monday- Friday between 8:30am-5pm  2. Prescription(s) must be in pharmacy by 3pm to be filled same day  3. Copy of patient's insurance/ prescription drug card and patient face sheet must be sent along with the prescription(s)  4. Cost of Rx cannot be added to hospital bill. If financial assistance is needed, please contact unit  or ;  or  CANNOT provide pharmacy voucher for patients co-pays  5. Patients can then  the prescription on their way out of the hospital at discharge, or pharmacy can deliver to the bedside if staff is available. (payment due at time of pick-up or delivery - cash, check, or card accepted)     Able to afford home medications/ co-pay costs: Yes    ADLS  Support Systems: Family Members,Spouse/Significant Other    PT AM-PAC:     OT AM-PAC:       [place]: home  Steps: 4 outside, none inside    Plans to RETURN to current housing: Yes  Barriers to RETURNING to current housin [address]  Currently ACTIVE with [address]: No    Currently ACTIVE with [place] on Aging: No      Durable Medical Equipment  Equipment:     Home Oxygen and [address] prior to admission: No    Dialysis  Active with HD/PD prior to admission: No    DISCHARGE PLAN:  Disposition: Home- No Services Needed    Transportation PLAN for discharge: family     Factors facilitating achievement of predicted outcomes: Pleasant    Barriers to discharge: Medical complications    Additional Case Management Notes:  Pt is from home, plans to return at DC. Pt was recently [address] [address] home  after undergoing I&D of a right labial abscess, returned due to pain in right inner thigh. CM will continue following for DC needs and recs.         The Plan for Transition of Care is related to the following treatment goals of Cellulitis and abscess of buttock [L02.31, L03.317]  Cellulitis [L03.90]  Tachycardia [R00.0]  Right thigh pain [M79.651]  Cellulitis of right thigh [L03.115]  Acute gout involving toe of right foot, unspecified cause [M10.9]    The Patient and/or patient representative Brenna Godfrey and her family were provided with a choice of provider and agrees with the discharge plan Yes    Freedom of choice list was provided with basic dialogue that supports the patient's individualized plan of care/goals and shares the quality data associated with the providers.  Yes    Care Transition patient: Yes    GARCIA Schilling, [facility]  Case Management Department  Ph: [phone]   Fax: [phone]

## 2022-04-18 NOTE — CONSULTS
Clinical Pharmacy Progress Note    Vancomycin has been discontinued. Will sign off pharmacy to dose Vancomycin consult. If medication is restarted and pharmacy is to manage dosing, please re-consult at that time.     Please call with questions--  Thanks--  Ericka Hearn, PharmD, BCPS, BCGP  [id number] ([facility])   4/18/2022 10:50 AM

## 2022-04-18 NOTE — PROGRESS NOTES
Surgery Daily Progress Note  Christopher Griffin  CC:  RLE pain    Subjective : No acute issues overnight. HDS, Afebrile. Pt states that incisional pain is improving. Increased mobility of R toe      Objective    Infusions:   sodium chloride      dextrose      dextrose          I/O:No intake/output data recorded. Wt Readings from Last 1 Encounters:   04/17/22 187 lb 13.3 oz (85.2 kg)                 LABS:    Recent Labs     04/16/22  0628 04/17/22  1823   WBC 11.1* 11.0   HGB 10.6* 13.0   HCT 33.0* 41.0   MCV 91.1 91.4    178        Recent Labs     04/16/22  0628 04/17/22  1704    136   K 4.0 4.4    98*   CO2 22 20*   PHOS 4.2  --    BUN 12 9   CREATININE 1.0 1.1        Recent Labs     04/17/22  1704   AST 15   ALT 14   BILITOT 0.7   ALKPHOS 99      No results for input(s): LIPASE, AMYLASE in the last 72 hours.      Recent Labs     04/17/22  1704   PROT 7.4        Recent Labs     04/17/22  1704   CKTOTAL 96               Exam:BP (!) 116/56   Pulse 95   Temp 98.2 °F (36.8 °C) (Oral)   Resp 18   Ht 5' 7\" (1.702 m)   Wt 187 lb 13.3 oz (85.2 kg)   SpO2 95%   BMI 29.42 kg/m²   General appearance: Alert, appears uncomfortable, grooming appropriate  HEENT: Normocephalic, atraumatic; EOMI  Neck: Trachea midline, no JVD  Chest/Lungs: Normal inspiratory effort, symmetric chest rise, no accessory muscle use  Cardiovascular: Tachycardic, regular rhythm; perfusing extremities  Abdomen: Soft, non-tender, non-distended, no guarding/rigidity  Extremities: R medial groin wound significantly tender with scant purulent drainage with minimal surrounding erythema, no crepitus or evidence of necrosis; erythema present on the dorsal surface of the R MTP joint; entire R foot initially tender to palpation but easily distractible to where only tenderness involves the distal medial foot and 1st MTP joint  Neuro: A&Ox3, no gross sensory or motor neuro deficits       ASSESSMENT/PLAN: Pt. is a 56 y.o. female s/p cardiomyopathy, hypertension, and gout who presents to [facility] ED with continuing pain related to her R buttock/thigh abscess (I&D POD #3). Persistent RLE pain. - Recommend Changing zosyn to cefepime to decrease likelyhood of SHELLIE with combo zosyn and vanco  - F/U labs and [facility]  - CT without evidence of necrotizing infection.   - Cont to monitor groin, daily dressing changes by surgery  - cont reg diet  - cont med management of gout    Vickie Howell, [facility] [place]  4/18/2022   6:15 AM  perfectserve    Attending Supervising Physician's Attestation Statement  I performed a history and physical examination on the patient and discussed the management with the nurse practitioner. I reviewed and agree with the findings and plan as documented in his note . Feeling better this AM. CT without concerning findings. Cont wet to dry dressings. Will de-esclaate ATBx to PO. Gout Tx per IM. No indications for re-operation.     Electronically signed by Nawaf Paz MD on 4/18/22 at 7:30 AM EDT

## 2022-04-18 NOTE — H&P
Internal Medicine  PGY 2  History & Physical      CC thigh pain    History Obtained From:  patient    HISTORY OF PRESENT ILLNESS:  79-year-old female with a past medical history of cardiomyopathy due to unclear etiology, hypertension, gout who presented to the ED with worsening right thigh pain.  She recently developed a right inferior medial buttock abscess and underwent an I&D at the [facility] ED on 3 /27 which was followed by a repeat I&D at the [address] [place] [address] on 4/15 being discharged home  aftyerwards.  During her initial visit to [facility] she was discharged on Keflex and Bactrim and on her second I&D 2 days ago she was not discharged on any antibiotics.  Today she presents for worsening right thigh pain and pain in her right foot big toe.  In the ED her vitals were significant for tachycardia.  Noteworthy labs were an elevated CRP of 125, CMP significant for an anion gap of 18 with a bicarbonate of 20. . CT abdomen pelvis with contrast showed cellulitis along the proximal medial right thigh.  No cortical bone loss or gas present. On physical exam she was in distress secondary to pain.  She had purulent discharge coming out of her right groin as well as an erythematous tender, swollen right big toe. General surgery was consulted who recommended no acute surgical intervention. Past Medical History:        Diagnosis Date    Allergic rhinitis     Cardiomyopathy ([address]     Depression     Gout     Hypertension    ·     Past Surgical History:        Procedure Laterality Date    APPENDECTOMY      COLONOSCOPY  11/5/2021    COLONOSCOPY POLYPECTOMY SNARE/COLD BIOPSY performed by Thai Reed MD at [address] [place] [address] Road      neck    HYSTERECTOMY      PARTIAL HYSTERECTOMY     ·     Medications Priorto Admission:    · Medications Prior to Admission: oxyCODONE-acetaminophen (PERCOCET) 5-325 MG per tablet, Take 1 tablet by mouth every 6 hours as needed for Pain for up to 7 days. Intended supply: 7 days. Take lowest dose possible to manage pain  · losartan-hydroCHLOROthiazide (HYZAAR) 100-25 MG per tablet, TAKE 1 TABLET BY MOUTH ONE TIME A DAY  · ibuprofen (ADVIL;MOTRIN) 600 MG tablet, Take 1 tablet by mouth every 6 hours as needed for Pain  · vitamin D (ERGOCALCIFEROL) 1.25 MG (01519 UT) CAPS capsule, Take 1 capsule by mouth once a week  · colchicine (COLCRYS) 0.6 MG tablet, Take 1 tablet by mouth daily 1.2 mg at the first sign of flare, followed in 1 hour with a single dose of 0.6 mg max of 1.8 mg daily  · albuterol sulfate HFA (VENTOLIN HFA) 108 (90 Base) MCG/ACT inhaler, Inhale 2 puffs into the lungs 4 times daily as needed for Wheezing  · cetirizine (ZYRTEC) 10 MG tablet, Take 10 mg by mouth daily As needed    Allergies:  Lisinopril    Social History:   · TOBACCO:   reports that she has been smoking cigarettes. She has a 6.20 pack-year smoking history. She has never used smokeless tobacco.  · ETOH:   reports current alcohol use of about 2.0 standard drinks of alcohol per week. ·   ·   Family History:       Problem Relation Age of Onset    Diabetes Mother    [place]rtiz Rheum Arthritis Mother     Diabetes Father     Diabetes Other     High Blood Pressure Other     Other Other         CVA    Lupus Sister     Rheum Arthritis Sister     Diabetes Maternal Grandmother     Hypertension Maternal Grandmother     Diabetes Maternal Grandfather     Hypertension Maternal Grandfather     No Known Problems Paternal Grandmother     No Known Problems Paternal Grandfather     Heart Disease Neg Hx    ·     Review of Systems   Constitutional: Negative for activity change, appetite change, chills and diaphoresis. HENT: Negative for dental problem, drooling, ear discharge, ear pain and hearing loss. Eyes: Negative for pain, discharge, redness and itching. Respiratory: Negative for cough, choking and chest tightness. Cardiovascular: Negative for chest pain, palpitations and leg swelling. Gastrointestinal: Negative for abdominal pain, anal bleeding, blood in stool, constipation and vomiting. Endocrine: Negative for heat intolerance. Genitourinary: Positive for pelvic pain. Negative for flank pain, frequency, genital sores, hematuria and menstrual problem. Musculoskeletal: Positive for joint swelling. Negative for back pain and gait problem. Neurological: Negative for dizziness, syncope, facial asymmetry, speech difficulty, light-headedness, numbness and headaches. Psychiatric/Behavioral: Negative for agitation and confusion. Physical Exam  Constitutional:       General: She is in acute distress. Appearance: Normal appearance. She is obese. She is not ill-appearing or toxic-appearing. HENT:      Nose: Nose normal.      Mouth/Throat:      Mouth: Mucous membranes are moist.   Eyes:      Extraocular Movements: Extraocular movements intact. Pupils: Pupils are equal, round, and reactive to light. Cardiovascular:      Rate and Rhythm: Normal rate and regular rhythm. Heart sounds: No murmur heard. No gallop. Pulmonary:      Effort: No respiratory distress. Breath sounds: No rales. Chest:      Chest wall: No tenderness. Abdominal:      Palpations: Abdomen is soft. Tenderness: There is no right CVA tenderness or left CVA tenderness. Genitourinary:     Vagina: No vaginal discharge. Rectum: Guaiac result negative. Musculoskeletal:         General: Swelling and tenderness present. Normal range of motion. Right lower leg: No edema. Left lower leg: No edema. Comments: Rt big toe swelling , tenderness. R medial buttock wound significantly tender with  purulent drainage with minimal surrounding erythema, no crepitus or evidence of necrosis. Skin:     General: Skin is warm. Capillary Refill: Capillary refill takes less than 2 seconds. Neurological:      Mental Status: She is alert and oriented to person, place, and time. Cranial Nerves: No cranial nerve deficit. Sensory: No sensory deficit. Motor: No weakness. Coordination: Coordination normal.   Psychiatric:         Mood and Affect: Mood normal.         Behavior: Behavior normal.            Vitals:    04/17/22 2033   BP: 113/87   Pulse: 111   Resp: 19   Temp:    SpO2: 91%       DATA:    Labs:  CBC:   Recent Labs     04/16/22  0628 04/17/22  1823   WBC 11.1* 11.0   HGB 10.6* 13.0   HCT 33.0* 41.0    178       BMP:   Recent Labs     04/16/22  0628 04/17/22  1704    136   K 4.0 4.4    98*   CO2 22 20*   BUN 12 9   CREATININE 1.0 1.1   GLUCOSE 106* 111*   PHOS 4.2  --      LFT's:   Recent Labs     04/17/22  1704   AST 15   ALT 14   BILITOT 0.7   ALKPHOS 99     Troponin: No results for input(s): TROPONINI in the last 72 hours. BNP:No results for input(s): BNP in the last 72 hours. ABGs: No results for input(s): PHART, TBF9WGI, PO2ART in the last 72 hours. INR: No results for input(s): INR in the last 72 hours. U/A:  Recent Labs     04/17/22  1704   COLORU Yellow   PHUR 6.0   WBCUA 0-2   RBCUA 0-2   CLARITYU Clear   SPECGRAV 1.010   LEUKOCYTESUR Negative   UROBILINOGEN 0.2   BILIRUBINUR Negative   BLOODU Negative   GLUCOSEU Negative       CT PELVIS W CONTRAST Additional Contrast? None   Final Result      1. Post-surgical changes of right labial incision and drainage. No evidence of residual abscess. 2.  Cellulitis along the proximal medial right thigh. ASSESSMENT AND PLAN:      Cellulitis of right thigh with abscess drainage from incision site. Concern for MRSA, will start with vancomycin  Wound culture  Blood cultures  Dilaudid for pain control      Suspected gout flare  Patient states she was taking some medication for gout several years ago but was taken off by her PCP.   -She has had flareups in the past and from her history it looks like she was given a course of steroids which significantly improved her

## 2022-04-18 NOTE — PROGRESS NOTES
4 Eyes Admission Assessment     I agree as the admission nurse that 2 RN's have performed a thorough Head to Toe Skin Assessment on the patient. ALL assessment sites listed below have been assessed on admission. Areas assessed by both nurses:   [x]   Head, Face, and Ears   [x]   Shoulders, Back, and Chest  [x]   Arms, Elbows, and Hands   [x]   Coccyx, Sacrum, and Ischium  [x]   Legs, Feet, and Heels        Does the Patient have Skin Breakdown?   Yes a wound was noted on the Admission Assessment and an LDA was Initiated documentation include the Vee-wound, Wound Assessment, Measurements, Dressing Treatment, Drainage, and Color\",         Niranjan Prevention initiated:  No   Wound Care Orders initiated:  No      WOC nurse consulted for Pressure Injury (Stage 3,4, Unstageable, DTI, NWPT, and Complex wounds) or Niranjan score 18 or lower:  No      Nurse 1 eSignature: Electronically signed by Emerita Gupta RN on 4/18/22 at 6:21 AM EDT    **SHARE this note so that the co-signing nurse is able to place an eSignature**    Nurse 2 eSignature: Electronically signed by Marvie Barthel, RN on 4/18/22 at 6:21 AM EDT

## 2022-04-18 NOTE — PROGRESS NOTES
Clinical Pharmacy Progress Note    IV Vancomycin - Management by Pharmacy    Consult Date(s): 4/17/22  Consulting Provider(s): Dr. Powers So / Plan    Cellulitis of right thigh with abscess drainage from incision site - Vancomycin   Concurrent Antimicrobials: None   Day of Vanc Therapy: #1   Current Dosing Method: Bayesian-Guided AUC Dosing   Therapeutic Goal: 400-600 mg/L*hr   Current Dose / Frequency: 1000 mg IV every 18 hours   Plan / Rationale:   o Patient received vancomycin 1750 mg x1 prior to consult  o Per Bayesian-Guided AUC dosing, the above regimen is expected to yield an AUC of 414 mg/L.hr and trough of 12.7 mg/L   Will continue to monitor clinical condition and make adjustments to regimen as appropriate. Thank you for consulting Pharmacy! Jocleyn Landry, PharmD, BCPS      Interval update: Patient is s/p repeated I&D for right inferior medial buttock abscess at [facility] on 3/27 and 4/15 respectively. Patient was d/c'ed on Keflex and Bactrim after the initial I&D but no abx after the 2nd I&D couple days ago. Subjective/Objective: Ms. Jarvis Marie is a 56 y.o. female with a PMHx significant for HTN, cardiomyopathy, out, admitted for Cellulitis of right thigh with abscess drainage from incision site. Pharmacy has been consulted to dose vancomycin. Height:   Ht Readings from Last 1 Encounters:   04/17/22 5' 7\" (1.702 m)     Weight:   Wt Readings from Last 1 Encounters:   04/17/22 187 lb 13.3 oz (85.2 kg)       Current & Prior Antimicrobial Regimen(s):   Zosyn 4500 mg x1; followed by 3375 mg every 8 hours   Vancomycin 1750 mg IVx1; then 1000 mg every 18 hours    Level(s) / Doses:    Date Time Dose Level / Type of Level Interpretation                 Note: Serum levels collected for AUC-based dosing may be high if collected in close proximity to the dose administered. This is not necessarily indicative of toxicity.     Cultures & Sensitivities:    Date Site Micro Susceptibility / Result   4/17/22 Bloodx2 pending    4/17/22 Wound pending      Labs / Ancillary Data:    Estimated Creatinine Clearance: 65 mL/min (based on SCr of 1.1 mg/dL). Recent Labs     04/16/22  0628 04/17/22  1704 04/17/22  1823   CREATININE 1.0 1.1  --    BUN 12 9  --    WBC 11.1*  --  11.0       Additional Lab Values / Findings of Note:    Procalcitonin: No results for input(s): PROCAL in the last 72 hours.

## 2022-04-18 NOTE — PLAN OF CARE
Problem: Falls - Risk of:  Goal: Will remain free from falls  Description: Will remain free from falls  Outcome: Ongoing  Note: Pt calls appropriately for assistance. Able to stand and walk very short distance to the bathroom and back to bed, but is inhibited by right groin and foot pain with movement. Bed low and locked; nonskid footwear on, bed alarm activated, door cracked open. Will monitor. Problem: Body Temperature - Imbalanced:  Goal: Ability to maintain a body temperature in the normal range will improve  Description: Ability to maintain a body temperature in the normal range will improve  Outcome: Ongoing  Note: Pt afebrile overnight. Problem: Pain:  Goal: Pain level will decrease  Description: Pain level will decrease  Outcome: Ongoing  Note: Pt verbalizes good pain relief with current PRN pain medication. Pt states pain is tolerable at rest, but quickly escalates with movement or if someone touches it. Pt describes her pain as a deep throbbing, \"like a heart beat. \"       Problem: Pain:  Goal: Pain level will decrease  Description: Pain level will decrease  Outcome: Ongoing  Note: Pt verbalizes good pain relief with current PRN pain medication. Pt states pain is tolerable at rest, but quickly escalates with movement or if someone touches it. Pt describes her pain as a deep throbbing, \"like a heart beat. \"

## 2022-04-18 NOTE — PLAN OF CARE
Problem: Falls - Risk of:  Goal: Will remain free from falls  Description: Will remain free from falls  4/18/2022 1538 by Ngozi Bryson RN  Outcome: Ongoing     Problem: Falls - Risk of:  Goal: Absence of physical injury  Description: Absence of physical injury  4/18/2022 1538 by Ngozi Bryson RN  Outcome: Ongoing     Problem: Discharge Planning:  Goal: Discharged to appropriate level of care  Description: Discharged to appropriate level of care  4/18/2022 1538 by Ngozi Bryson RN  Outcome: Ongoing     Problem: Body Temperature - Imbalanced:  Goal: Ability to maintain a body temperature in the normal range will improve  Description: Ability to maintain a body temperature in the normal range will improve  4/18/2022 1538 by Ngozi Bryson RN  Outcome: Ongoing     Problem: Pain:  Goal: Pain level will decrease  Description: Pain level will decrease  4/18/2022 1538 by Ngozi Bryson RN  Outcome: Ongoing     Problem: Pain:  Goal: Control of acute pain  Description: Control of acute pain  4/18/2022 1538 by Ngozi Bryson RN  Outcome: Ongoing     Problem: Skin Integrity - Impaired:  Goal: Will show no infection signs and symptoms  Description: Will show no infection signs and symptoms  4/18/2022 1538 by Ngozi Bryson RN  Outcome: Ongoing  Note: Monitoring s/s of infection. On ABX. VSS WNL.      Problem: Skin Integrity - Impaired:  Goal: Absence of new skin breakdown  Description: Absence of new skin breakdown  4/18/2022 1538 by Ngozi Bryson RN  Outcome: Ongoing     Problem: Pain:  Goal: Pain level will decrease  Description: Pain level will decrease  4/18/2022 1538 by Ngozi Bryson RN  Outcome: Ongoing     Problem: Pain:  Goal: Control of acute pain  Description: Control of acute pain  4/18/2022 1538 by Ngozi Bryson RN    Problem: Skin Integrity:  Goal: Will show no infection signs and symptoms  Description: Will show no infection signs and symptoms  4/18/2022 1538 by Toya Farrell RN  Outcome: Ongoing     Problem: Skin Integrity:  Goal: Absence of new skin breakdown  Description: Absence of new skin breakdown  4/18/2022 1538 by Toya Farrell RN  Outcome: Ongoing

## 2022-04-18 NOTE — PROGRESS NOTES
Progress Note    Admit Date: 4/17/2022  Day: 1  Diet: ADULT DIET; Regular; 4 carb choices (60 gm/meal); Low Sodium (2 gm)    CC: R groin pain     Interval history: NAEON    Medications:     Scheduled Meds:   piperacillin-tazobactam  3,375 mg IntraVENous Q8H    vancomycin  1,000 mg IntraVENous Q18H    magnesium sulfate  2,000 mg IntraVENous Once    predniSONE  40 mg Oral Daily    sodium chloride flush  5-40 mL IntraVENous 2 times per day    heparin (porcine)  5,000 Units SubCUTAneous 3 times per day     Continuous Infusions:   sodium chloride      dextrose      dextrose       PRN Meds:sodium chloride flush, sodium chloride, ondansetron **OR** ondansetron, polyethylene glycol, acetaminophen **OR** acetaminophen, glucose, dextrose, glucagon (rDNA), dextrose, HYDROmorphone **OR** HYDROmorphone    Objective:   Vitals:   T-max:  Patient Vitals for the past 8 hrs:   BP Temp Temp src Pulse Resp SpO2 Weight   04/18/22 0438 127/61 98.3 °F (36.8 °C) Oral 93 18 96 % 188 lb 0.8 oz (85.3 kg)   04/18/22 0134 (!) 116/56 98.2 °F (36.8 °C) Oral 95 18 95 %        Intake/Output Summary (Last 24 hours) at 4/18/2022 0829  Last data filed at 4/17/2022 2310  Gross per 24 hour   Intake 490 ml   Output 375 ml   Net 115 ml       Review of Systems  ROS positive for R groin pain as well as pain in R great toe. Physical Exam  Constitutional:       General: She is not in acute distress     Appearance: Normal appearance. She is obese. She is not ill-appearing or toxic-appearing. HENT:      Nose: Nose normal.      Mouth/Throat:      Mouth: Mucous membranes are moist.   Eyes:      Extraocular Movements: Extraocular movements intact. Pupils: Pupils are equal, round, and reactive to light. Cardiovascular:      Rate and Rhythm: Normal rate and regular rhythm. Heart sounds: No murmur heard. No gallop. Pulmonary:      Effort: No respiratory distress. Breath sounds: No rales. Chest:      Chest wall: No tenderness. Abdominal:      Palpations: Abdomen is soft. Tenderness: There is no right CVA tenderness or left CVA tenderness. Genitourinary:     Vagina: No vaginal discharge. Rectum: Guaiac result negative. Musculoskeletal:         General: swelling, erythema and tenderness of R great toe      Right lower leg: No edema. Left lower leg: No edema. Comments: R medial groin wound significantly tender with  purulent drainage with minimal surrounding erythema, no crepitus or evidence of necrosis. Skin:     General: Skin is warm. Capillary Refill: Capillary refill takes less than 2 seconds. Neurological:      Mental Status: She is alert and oriented to person, place, and time. Cranial Nerves: No cranial nerve deficit. Sensory: No sensory deficit. Motor: No weakness. Coordination: Coordination normal.   Psychiatric:         Mood and Affect: Mood normal.         Behavior: Behavior normal.   LABS:    CBC:   Recent Labs     04/16/22 0628 04/17/22  1823 04/18/22  0639   WBC 11.1* 11.0 9.2   HGB 10.6* 13.0 10.0*   HCT 33.0* 41.0 30.8*    178 199   MCV 91.1 91.4 90.7     Renal:    Recent Labs     04/16/22 0628 04/17/22  1704 04/18/22  0639    136 138   K 4.0 4.4 4.6    98* 104   CO2 22 20* 21   BUN 12 9 7   CREATININE 1.0 1.1 1.1   GLUCOSE 106* 111* 138*   CALCIUM 9.1 9.6 8.9   MG 1.20*  --  1.40*   PHOS 4.2  --  3.1   ANIONGAP 12 18* 13     Hepatic:   Recent Labs     04/16/22 0628 04/17/22  1704 04/18/22  0639   AST  --  15  --    ALT  --  14  --    BILITOT  --  0.7  --    PROT  --  7.4  --    LABALBU 3.9 4.1 3.7   ALKPHOS  --  99  --      Troponin: No results for input(s): TROPONINI in the last 72 hours. BNP: No results for input(s): BNP in the last 72 hours. Lipids: No results for input(s): CHOL, HDL in the last 72 hours.     Invalid input(s): LDLCALCU, TRIGLYCERIDE  ABGs:  No results for input(s): PHART, RKD7HRB, PO2ART, OBU4MUG, BEART, THGBART, K8DFLRKB, RJY9ITJ in the last 72 hours. INR: No results for input(s): INR in the last 72 hours. Lactate: No results for input(s): LACTATE in the last 72 hours. Cultures:  -----------------------------------------------------------------  RAD:   CT PELVIS W CONTRAST Additional Contrast? None   Final Result      1. Post-surgical changes of right labial incision and drainage. No evidence of residual abscess. 2.  Cellulitis along the proximal medial right thigh. Assessment/Plan:   Cellulitis of right thigh with abscess drainage from incision site. Will cover for MRSA, gram negative and anaerobes given location of abscess.  Bactrim OK   Wound culture  Blood cultures  Dilaudid for pain control      Suspected gout flare  Patient states she was taking some medication for gout several years ago but was taken off by her PCP. Prednisone 40 mg p.o.  Serum uric acid levels  Encourage hydration and low protein diet      Cardiomyopathy of unclear etiology   She had an echo done in 2017 which showed an EF of 35 to 37%. Her last echo in January of 2021 shows an EF of 50% with grade 1 diastolic dysfunction.   As she is not in acute exacerbation of heart failure she can follow-up with her cardiologist for her cardiomyopathy    Hypertension  Patient is on losartan at home however her blood pressures were soft in the ED so we will hold losartan.        Code Status:Full code  FEN: Adult, low protein  PPX: Heparin  DISPO: Piedad Hart MD, PGY-1  04/18/22  8:29 AM    This patient has been staffed and discussed with Dannie Coffey MD.

## 2022-04-18 NOTE — CONSULTS
Clinical Pharmacy Progress Note  Medication History      Asked to verify home medications by Dr. Demetrius Medina. List of of current medications patient is taking is complete. Home Medication list in EPIC updated to reflect changes noted below.      Source of information: Patient & SureScripts     Changes made to home medication list:   Medications removed (no longer taking):  · Colchicine  · Percocet     Medications added:   · None     Medication doses / instructions adjusted:   · None     Other notes:    None     Shan Tello, PharmD Candidate 2022 4/18/2022  10:27 AM      Current Outpatient Medications   Medication Instructions    albuterol sulfate HFA (VENTOLIN HFA) 108 (90 Base) MCG/ACT inhaler 2 puffs, Inhalation, 4 TIMES DAILY PRN    cetirizine (ZYRTEC) 10 mg, Oral, DAILY, As needed    ibuprofen (ADVIL;MOTRIN) 600 mg, Oral, EVERY 6 HOURS PRN    losartan-hydroCHLOROthiazide (HYZAAR) 100-25 MG per tablet TAKE 1 TABLET BY MOUTH ONE TIME A DAY    vitamin D (ERGOCALCIFEROL) 50,000 Units, Oral, WEEKLY

## 2022-04-18 NOTE — PROGRESS NOTES
Pharmacy Note - Extended Infusion Beta-Lactam Adjustment    Piperacillin/Tazobactam 4500 mg every 8 hours ordered for patient. Per [facility] Extended Infusion Beta-Lactam Policy, order will be changed to 3375 mg 8 hours. Estimated Creatinine Clearance: Estimated Creatinine Clearance: 65 mL/min (based on SCr of 1.1 mg/dL). Dialysis Status, SHELLIE, CKD:   BMI: Body mass index is 29.42 kg/m². Rationale for Adjustment: Agent demonstrates time-dependent effect on bacterial eradication. Extended-infusion dosing strategy aims to enhance microbiologic and clinical efficacy. Pharmacy will continue to monitor cultures and sensitivities (where available) and adjust dose as necessary. Please call with any questions.     Luli Solomon, PharmD, BCPS

## 2022-04-19 VITALS
SYSTOLIC BLOOD PRESSURE: 123 MMHG | RESPIRATION RATE: 16 BRPM | TEMPERATURE: 98.3 F | OXYGEN SATURATION: 96 % | BODY MASS INDEX: 30.66 KG/M2 | DIASTOLIC BLOOD PRESSURE: 80 MMHG | WEIGHT: 195.33 LBS | HEART RATE: 91 BPM | HEIGHT: 67 IN

## 2022-04-19 LAB
ALBUMIN SERPL-MCNC: 3.9 G/DL (ref 3.4–5)
ALBUMIN SERPL-MCNC: 3.9 G/DL (ref 3.4–5)
ANION GAP SERPL CALCULATED.3IONS-SCNC: 9 MMOL/L (ref 3–16)
ANION GAP SERPL CALCULATED.3IONS-SCNC: 9 MMOL/L (ref 3–16)
BASOPHILS ABSOLUTE: 0.1 K/UL (ref 0–0.2)
BASOPHILS ABSOLUTE: 0.1 K/UL (ref 0–0.2)
BASOPHILS RELATIVE PERCENT: 0.5 %
BASOPHILS RELATIVE PERCENT: 0.5 %
BUN BLDV-MCNC: 15 MG/DL (ref 7–20)
BUN BLDV-MCNC: 15 MG/DL (ref 7–20)
CALCIUM SERPL-MCNC: 9.6 MG/DL (ref 8.3–10.6)
CALCIUM SERPL-MCNC: 9.6 MG/DL (ref 8.3–10.6)
CHLORIDE BLD-SCNC: 103 MMOL/L (ref 99–110)
CHLORIDE BLD-SCNC: 106 MMOL/L (ref 99–110)
CO2: 25 MMOL/L (ref 21–32)
CO2: 26 MMOL/L (ref 21–32)
CREAT SERPL-MCNC: 1.2 MG/DL (ref 0.6–1.1)
CREAT SERPL-MCNC: 1.2 MG/DL (ref 0.6–1.1)
EOSINOPHILS ABSOLUTE: 0 K/UL (ref 0–0.6)
EOSINOPHILS ABSOLUTE: 0 K/UL (ref 0–0.6)
EOSINOPHILS RELATIVE PERCENT: 0.1 %
EOSINOPHILS RELATIVE PERCENT: 0.2 %
GFR AFRICAN AMERICAN: 56
GFR AFRICAN AMERICAN: 56
GFR NON-AFRICAN AMERICAN: 47
GFR NON-AFRICAN AMERICAN: 47
GLUCOSE BLD-MCNC: 117 MG/DL (ref 70–99)
GLUCOSE BLD-MCNC: 120 MG/DL (ref 70–99)
HCT VFR BLD CALC: 31.2 % (ref 36–48)
HCT VFR BLD CALC: 31.8 % (ref 36–48)
HEMOGLOBIN: 10.1 G/DL (ref 12–16)
HEMOGLOBIN: 10.1 G/DL (ref 12–16)
LYMPHOCYTES ABSOLUTE: 2 K/UL (ref 1–5.1)
LYMPHOCYTES ABSOLUTE: 2 K/UL (ref 1–5.1)
LYMPHOCYTES RELATIVE PERCENT: 15.5 %
LYMPHOCYTES RELATIVE PERCENT: 15.6 %
MAGNESIUM: 1.9 MG/DL (ref 1.8–2.4)
MAGNESIUM: 1.9 MG/DL (ref 1.8–2.4)
MCH RBC QN AUTO: 29.1 PG (ref 26–34)
MCH RBC QN AUTO: 29.5 PG (ref 26–34)
MCHC RBC AUTO-ENTMCNC: 31.8 G/DL (ref 31–36)
MCHC RBC AUTO-ENTMCNC: 32.5 G/DL (ref 31–36)
MCV RBC AUTO: 90.9 FL (ref 80–100)
MCV RBC AUTO: 91.4 FL (ref 80–100)
MONOCYTES ABSOLUTE: 0.8 K/UL (ref 0–1.3)
MONOCYTES ABSOLUTE: 1 K/UL (ref 0–1.3)
MONOCYTES RELATIVE PERCENT: 6.6 %
MONOCYTES RELATIVE PERCENT: 8 %
NEUTROPHILS ABSOLUTE: 9.8 K/UL (ref 1.7–7.7)
NEUTROPHILS ABSOLUTE: 9.9 K/UL (ref 1.7–7.7)
NEUTROPHILS RELATIVE PERCENT: 75.8 %
NEUTROPHILS RELATIVE PERCENT: 77.2 %
PDW BLD-RTO: 13.6 % (ref 12.4–15.4)
PDW BLD-RTO: 13.8 % (ref 12.4–15.4)
PHOSPHORUS: 3.1 MG/DL (ref 2.5–4.9)
PHOSPHORUS: 3.1 MG/DL (ref 2.5–4.9)
PLATELET # BLD: 239 K/UL (ref 135–450)
PLATELET # BLD: 244 K/UL (ref 135–450)
PMV BLD AUTO: 10.4 FL (ref 5–10.5)
PMV BLD AUTO: 10.6 FL (ref 5–10.5)
POTASSIUM SERPL-SCNC: 4.1 MMOL/L (ref 3.5–5.1)
POTASSIUM SERPL-SCNC: 4.1 MMOL/L (ref 3.5–5.1)
RBC # BLD: 3.43 M/UL (ref 4–5.2)
RBC # BLD: 3.48 M/UL (ref 4–5.2)
SODIUM BLD-SCNC: 137 MMOL/L (ref 136–145)
SODIUM BLD-SCNC: 141 MMOL/L (ref 136–145)
WBC # BLD: 12.8 K/UL (ref 4–11)
WBC # BLD: 12.9 K/UL (ref 4–11)

## 2022-04-19 PROCEDURE — 99024 POSTOP FOLLOW-UP VISIT: CPT | Performed by: SURGERY

## 2022-04-19 PROCEDURE — 36415 COLL VENOUS BLD VENIPUNCTURE: CPT

## 2022-04-19 PROCEDURE — 85025 COMPLETE CBC W/AUTO DIFF WBC: CPT

## 2022-04-19 PROCEDURE — 6370000000 HC RX 637 (ALT 250 FOR IP): Performed by: STUDENT IN AN ORGANIZED HEALTH CARE EDUCATION/TRAINING PROGRAM

## 2022-04-19 PROCEDURE — 83735 ASSAY OF MAGNESIUM: CPT

## 2022-04-19 PROCEDURE — 6370000000 HC RX 637 (ALT 250 FOR IP): Performed by: INTERNAL MEDICINE

## 2022-04-19 PROCEDURE — 80069 RENAL FUNCTION PANEL: CPT

## 2022-04-19 PROCEDURE — 6360000002 HC RX W HCPCS: Performed by: STUDENT IN AN ORGANIZED HEALTH CARE EDUCATION/TRAINING PROGRAM

## 2022-04-19 PROCEDURE — 2580000003 HC RX 258: Performed by: STUDENT IN AN ORGANIZED HEALTH CARE EDUCATION/TRAINING PROGRAM

## 2022-04-19 RX ORDER — OXYCODONE HYDROCHLORIDE 5 MG/1
5 TABLET ORAL EVERY 4 HOURS PRN
Status: DISCONTINUED | OUTPATIENT
Start: 2022-04-19 | End: 2022-04-19 | Stop reason: HOSPADM

## 2022-04-19 RX ORDER — ALLOPURINOL 100 MG/1
100 TABLET ORAL DAILY
Qty: 30 TABLET | Refills: 0 | Status: SHIPPED | OUTPATIENT
Start: 2022-04-26 | End: 2022-06-03 | Stop reason: SDUPTHER

## 2022-04-19 RX ORDER — OXYCODONE HYDROCHLORIDE 5 MG/1
2.5 TABLET ORAL EVERY 4 HOURS PRN
Status: DISCONTINUED | OUTPATIENT
Start: 2022-04-19 | End: 2022-04-19 | Stop reason: HOSPADM

## 2022-04-19 RX ORDER — COLCHICINE 0.6 MG/1
0.6 TABLET ORAL DAILY
Qty: 30 TABLET | Refills: 0 | Status: SHIPPED | OUTPATIENT
Start: 2022-04-20 | End: 2022-06-03 | Stop reason: SDUPTHER

## 2022-04-19 RX ORDER — PREDNISONE 20 MG/1
40 TABLET ORAL DAILY
Qty: 14 TABLET | Refills: 0 | Status: SHIPPED | OUTPATIENT
Start: 2022-04-20 | End: 2022-04-19 | Stop reason: HOSPADM

## 2022-04-19 RX ORDER — SULFAMETHOXAZOLE AND TRIMETHOPRIM 800; 160 MG/1; MG/1
1 TABLET ORAL EVERY 12 HOURS SCHEDULED
Qty: 14 TABLET | Refills: 0 | Status: SHIPPED | OUTPATIENT
Start: 2022-04-19 | End: 2022-04-26

## 2022-04-19 RX ORDER — OXYCODONE HYDROCHLORIDE 5 MG/1
5 TABLET ORAL EVERY 6 HOURS PRN
Qty: 12 TABLET | Refills: 0 | Status: SHIPPED | OUTPATIENT
Start: 2022-04-19 | End: 2022-04-22

## 2022-04-19 RX ADMIN — ACETAMINOPHEN 650 MG: 325 TABLET ORAL at 00:19

## 2022-04-19 RX ADMIN — SULFAMETHOXAZOLE AND TRIMETHOPRIM 1 TABLET: 800; 160 TABLET ORAL at 07:57

## 2022-04-19 RX ADMIN — HEPARIN SODIUM 5000 UNITS: 5000 INJECTION INTRAVENOUS; SUBCUTANEOUS at 05:55

## 2022-04-19 RX ADMIN — OXYCODONE 5 MG: 5 TABLET ORAL at 07:56

## 2022-04-19 RX ADMIN — OXYCODONE 5 MG: 5 TABLET ORAL at 13:32

## 2022-04-19 RX ADMIN — PREDNISONE 40 MG: 20 TABLET ORAL at 07:56

## 2022-04-19 RX ADMIN — COLCHICINE 0.6 MG: 0.6 TABLET, FILM COATED ORAL at 07:56

## 2022-04-19 RX ADMIN — SODIUM CHLORIDE, PRESERVATIVE FREE 10 ML: 5 INJECTION INTRAVENOUS at 07:57

## 2022-04-19 RX ADMIN — HYDROMORPHONE HYDROCHLORIDE 0.5 MG: 1 INJECTION, SOLUTION INTRAMUSCULAR; INTRAVENOUS; SUBCUTANEOUS at 00:19

## 2022-04-19 ASSESSMENT — PAIN SCALES - GENERAL
PAINLEVEL_OUTOF10: 8
PAINLEVEL_OUTOF10: 8
PAINLEVEL_OUTOF10: 2
PAINLEVEL_OUTOF10: 0
PAINLEVEL_OUTOF10: 7

## 2022-04-19 ASSESSMENT — PAIN DESCRIPTION - LOCATION
LOCATION: GROIN
LOCATION: GROIN

## 2022-04-19 ASSESSMENT — PAIN DESCRIPTION - ONSET
ONSET: ON-GOING
ONSET: ON-GOING

## 2022-04-19 ASSESSMENT — PAIN DESCRIPTION - FREQUENCY
FREQUENCY: INTERMITTENT
FREQUENCY: CONTINUOUS

## 2022-04-19 ASSESSMENT — PAIN DESCRIPTION - DESCRIPTORS
DESCRIPTORS: THROBBING
DESCRIPTORS: THROBBING

## 2022-04-19 ASSESSMENT — PAIN DESCRIPTION - ORIENTATION
ORIENTATION: RIGHT
ORIENTATION: RIGHT

## 2022-04-19 ASSESSMENT — PAIN DESCRIPTION - PROGRESSION
CLINICAL_PROGRESSION: NOT CHANGED
CLINICAL_PROGRESSION: GRADUALLY WORSENING

## 2022-04-19 ASSESSMENT — PAIN DESCRIPTION - PAIN TYPE
TYPE: ACUTE PAIN
TYPE: ACUTE PAIN

## 2022-04-19 NOTE — DISCHARGE INSTR - COC
Continuity of Care Form    Patient Name: Soo Maya   :  1965  MRN:  [id number]    Admit date:  2022  Discharge date:  ***    Code Status Order: Full Code   Advance Directives:      Admitting Physician:  Liliana Pretty MD  PCP: FERNANDO Perry CNP    Discharging Nurse: [facility] Unit/Room#: 6114/4599-09  Discharging Unit Phone Number: ***    Emergency Contact:   Extended Emergency Contact Information  Primary Emergency Contact: [address] [facility] Phone: [phone]  Mobile Phone: [phone]  Relation: Other  Secondary Emergency Contact: [facility] Phone: [phone]  Relation: Brother/Sister    Past Surgical History:  Past Surgical History:   Procedure Laterality Date    APPENDECTOMY      COLONOSCOPY  2021    COLONOSCOPY POLYPECTOMY SNARE/COLD BIOPSY performed by Rene Stephens MD at [facility] 119      neck    HYSTERECTOMY      LEG SURGERY Right 4/15/2022    RIGHT UPPER THIGH INCISION AND DRAINAGE performed by Leticia Garcia MD at [facility]         Immunization History:   Immunization History   Administered Date(s) Administered    COVID-19, Pfizer Purple top, DILUTE for use, 12+ yrs, 30mcg/0.3mL dose 2021, 2021, 2022    Influenza, MDCK Quadv, IM, PF (Flucelvax 2 yrs and older) 2021    Pneumococcal Polysaccharide ([id number]) 2015    Tdap (Boostrix, Adacel) 2015       Active Problems:  Patient Active Problem List   Diagnosis Code    Cardiomyopathy ([facility] [address] I42.9    DELUNA (dyspnea on exertion) R06.00    Tobacco abuse Z72.0    Alcohol ingestion Z78.9    Chronic gout of left foot M1A.0720    Essential hypertension I10    Other mixed anxiety disorders F41.3    Bronchitis J40    Gout M10.9    Abscess L02.91    Abscess of right thigh L02.415    Abscess of right groin L02.214    Cellulitis and abscess of buttock L02.31, L03.317    Cellulitis L03.90    Right thigh pain M79.651 Isolation/Infection:   Isolation            No Isolation          Patient Infection Status       None to display            Nurse Assessment:  Last Vital Signs: /73   Pulse 82   Temp 98 °F (36.7 °C) (Oral)   Resp 16   Ht 5' 7\" (1.702 m)   Wt 195 lb 5.2 oz (88.6 kg)   SpO2 98%   BMI 30.59 kg/m²     Last documented pain score (0-10 scale): Pain Level: 8  Last Weight:   Wt Readings from Last 1 Encounters:   04/19/22 195 lb 5.2 oz (88.6 kg)     Mental Status:  {IP PT MENTAL STATUS:20030}    IV Access:  { YESICA IV ACCESS:058580055}    Nursing Mobility/ADLs:  Walking   {[facility] DME CFFK:423255270}  Transfer  {P DME [facility]:807993176}  Bathing  {P DME MPIU:589186386}  Dressing  {P DME YBSD:133412979}  Toileting  {[facility] DME FETZ:614142150}  Feeding  {[facility] DME JEN:770080843}  Med Admin  {[facility] DME ZPSA:841608070}  Med Delivery   { YESICA MED Delivery:892696648}    Wound Care Documentation and Therapy:        Elimination:  Continence: Bowel: {YES / LJ:03223}  Bladder: {YES / VO:78062}  Urinary Catheter: {Urinary Catheter:893377756}   Colostomy/Ileostomy/Ileal Conduit: {YES / UX:74975}       Date of Last BM: ***    Intake/Output Summary (Last 24 hours) at 4/19/2022 0805  Last data filed at 4/18/2022 1418  Gross per 24 hour   Intake 359.85 ml   Output --   Net 359.85 ml     I/O last 3 completed shifts:   In: 849.9 [P.O.:780; I.V.:19; IV Piggyback:50.8]  Out: 375 [Urine:375]    Safety Concerns:     [employer] Safety Concerns:121228744}    Impairments/Disabilities:      [employer] Impairments/Disabilities:054396159}    Nutrition Therapy:  Current Nutrition Therapy:   [employer] Diet List:758106267}    Routes of Feeding: {[facility] DME Other Feedings:066935076}  Liquids: {Slp liquid thickness:40186}  Daily Fluid Restriction: {CHP DME Yes amt example:672878987}  Last Modified Barium Swallow with Video (Video Swallowing Test): {Done Not Done PYME:783304007}    Treatments at the Time of Hospital Discharge:   Respiratory Treatments: ***  Oxygen Therapy:  {Therapy; copd oxygen:53200}  Ventilator:    {MH CC Vent UFKU:751931272}    Rehab Therapies: {THERAPEUTIC INTERVENTION:9328029214}  Weight Bearing Status/Restrictions: [address] CC Weight Bearin}  Other Medical Equipment (for information only, NOT a DME order):  {EQUIPMENT:386637187}  Other Treatments: ***    Patient's personal belongings (please select all that are sent with patient):  {CHP DME Belongings:714980705}    RN SIGNATURE:  {Esignature:228926806}    CASE MANAGEMENT/SOCIAL WORK SECTION    Inpatient Status Date: 22    Readmission Risk Assessment Score:  Readmission Risk              Risk of Unplanned Readmission:  10           Discharging to:  Home with  Nikolski Famigo [facility] (RN-wound care)   [facility]  Phone: [phone]  Fax: [phone]    Dialysis Facility (if applicable)   NA    / signature: Electronically signed by Sridevi Foster, MSW, LSW on 22 at 12:19 PM EDT    PHYSICIAN SECTION    Prognosis: Good    Condition at Discharge: Stable    Rehab Potential (if transferring to Rehab): Good    Recommended Labs or Other Treatments After Discharge:  Wound Care  - Wet-to-dry (0.9% NaCl normal saline) gauze dressings to R groin wound bed; to be changed twice daily and after showering    Physician Certification: I certify the above information and transfer of Janneth Deluna  is necessary for the continuing treatment of the diagnosis listed and that she requires Home Care for less then 30 days.      Update Admission H&P: No change in H&P    PHYSICIAN SIGNATURE:  Electronically signed by Nasima Banuelos MD on 22 at 9:43 AM EDT

## 2022-04-19 NOTE — PROGRESS NOTES
Surgery Daily Progress Note      CC: R groin abscess    SUBJECTIVE:  Rested well overnight. Remains afebrile, hemodynamically stable. Incisional pain improving. ROS:   A 14 point review of systems was conducted, significant findings as noted above. All other systems negative. OBJECTIVE:    PHYSICAL EXAM:  Vitals:    04/18/22 1530 04/18/22 2000 04/19/22 0000 04/19/22 0400   BP: 117/75 131/77 129/79 123/80   Pulse: 96 93 88 85   Resp: 16 16 18 18   Temp: 97.9 °F (36.6 °C) 98.3 °F (36.8 °C) 97.7 °F (36.5 °C) 98.2 °F (36.8 °C)   TempSrc: Oral Oral Oral Axillary   SpO2:  97% 97% 98%   Weight:    195 lb 5.2 oz (88.6 kg)   Height:           General appearance: Alert, appears uncomfortable, grooming appropriate  HEENT: Normocephalic, atraumatic; EOMI  Neck: Trachea midline, no JVD  Chest/Lungs: Normal inspiratory effort, symmetric chest rise, no accessory muscle use  Cardiovascular: Regular rate, regular rhythm; perfusing extremities  Abdomen: Soft, non-tender, non-distended, no guarding/rigidity  Extremities: R medial groin wound significantly tender with minimal drainage; erythema present on the dorsal surface of the R MTP joint, tender to palpation  Neuro: A&Ox3, no gross sensory or motor neuro deficits      ASSESSMENT & PLAN:   Rhonda Bryant is a 56 y.o. female with cardiomyopathy, hypertension, and gout who presents to [facility] ED with continuing pain related to her R buttock/thigh abscess (I&D POD #4).      - Continue BID wet to dry dressing changes  - Continue Bactrim  - Further medical management per primary team    Elma Jerez MD, PGY-2  04/19/22  6:13 AM  [phone]

## 2022-04-19 NOTE — PROGRESS NOTES
Discharge order received. Patient informed of discharge order. Discharge instructions reviewed with patient. Copy of discharge instructions given to patient. Prescriptions filled and picked up from outpatient pharmacy. Patient verbalized understanding, denies needs or questions at this time. IV and telemetry removed. All patient belongings packed and sent with patient upon discharge. Patient left in wheelchair.

## 2022-04-19 NOTE — DISCHARGE SUMMARY
INTERNAL MEDICINE DEPARTMENT AT [address] [place] [address]  DISCHARGE SUMMARY    Patient ID: Billy Jamavais                                             Discharge Date: 4/19/2022   Patient's PCP: FERNANDO Bowles - HUSSAIN                                          Discharge Physician: Inna Baumann MD   Admit Date: 4/17/2022   Admitting Physician: Leighann Taveras MD    PROBLEMS DURING HOSPITALIZATION:  Present on Admission:   Cellulitis and abscess of buttock   Cellulitis   Right thigh pain      DISCHARGE DIAGNOSES:    HPI: Mrs. Shad Moraes is a 57 yo female with PMH cardiomyopathy, gout, HTN who presented to the hospital with worsening right groin pain. She recently developed a right inferior medial buttock abscess and underwent an I&D at the [facility] ED on 3 /27 which was followed by a repeat I&D at the [address] [place] [address] on 4/15 being discharged home afterwards. Dallas Cookers her initial visit to [facility] she was discharged on Keflex and Bactrim and on her second I&D 2 days ago she was not discharged on any antibiotics. CT abdomen pelvis with contrast showed cellulitis along the proximal medial right thigh.  No cortical bone loss or gas present. On physical exam she was in distress secondary to pain.  She had purulent discharge coming out of her right groin as well as an erythematous tender, swollen right big toe. The patient was treated for groin abscess with bactrim as well as consulting general surgery. They recommended treating with antibiotic therapy. The patient was also treated for gout flare in right MTP with colchicine and prednisone. Patient to follow up with general surgery after discharge from hospital. Patient to follow up with PCP regarding gout flare after discharge from hospital.     Patient discharged with 3 day supply oxycodone, bactrim for 7 days, prednisone 40 mg PO for 7 days, colchicine for 7 days.      The following issues were addressed during hospitalization:    R groin abscess  Gout flare  HTN     Physical Exam:  Physical Exam   Constitutional:       General: She is not in acute distress     Appearance: Normal appearance. She is obese. She is not ill-appearing or toxic-appearing. HENT:      Nose: Nose normal.      Mouth/Throat:      Mouth: Mucous membranes are moist.   Eyes:      Extraocular Movements: Extraocular movements intact.      Pupils: Pupils are equal, round, and reactive to light. Cardiovascular:      Rate and Rhythm: Normal rate and regular rhythm.      Heart sounds: No murmur heard. No gallop.    Pulmonary:      Effort: No respiratory distress.      Breath sounds: No rales. Chest:      Chest wall: No tenderness. Abdominal:      Palpations: Abdomen is soft.      Tenderness: There is no right CVA tenderness or left CVA tenderness. Genitourinary:     Vagina: No vaginal discharge.      Rectum: Guaiac result negative. Musculoskeletal:         General: swelling, erythema and tenderness of R great toe      Right lower leg: No edema.      Left lower leg: No edema.      Comments: R medial groin wound significantly tender with  purulent drainage with minimal surrounding erythema, no crepitus or evidence of necrosis.   Skin:     General: Skin is warm.      Capillary Refill: Capillary refill takes less than 2 seconds.    Neurological:      Mental Status: She is alert and oriented to person, place, and time.      Cranial Nerves: No cranial nerve deficit.      Sensory: No sensory deficit.      Motor: No weakness.      Coordination: Coordination normal.   Psychiatric:         Mood and Affect: Mood normal.         Behavior: Behavior normal.     Consults: general surgery  Significant Diagnostic Studies:  CTAP  Treatments: antibiotics  Disposition: home   Discharged Condition: Stable  Follow Up: Primary Care Physician in one week    DISCHARGE MEDICATION:       Medication List      START taking these medications    allopurinol 100 MG tablet  Commonly known as: ZYLOPRIM  Take 1 tablet by mouth daily  Start taking on: April 26, 2022     colchicine 0.6 MG tablet  Commonly known as: COLCRYS  Take 1 tablet by mouth daily for 7 days  Start taking on: April 20, 2022     oxyCODONE 5 MG immediate release tablet  Commonly known as: Roxicodone  Take 1 tablet by mouth every 6 hours as needed for Pain for up to 3 days. Intended supply: 3 days.  Take lowest dose possible to manage pain     sulfamethoxazole-trimethoprim 800-160 MG per tablet  Commonly known as: BACTRIM DS;SEPTRA DS  Take 1 tablet by mouth every 12 hours for 7 days        CHANGE how you take these medications    vitamin D 1.25 MG (89804 UT) Caps capsule  Commonly known as: ERGOCALCIFEROL  Take 1 capsule by mouth once a week  What changed: additional instructions        CONTINUE taking these medications    albuterol sulfate  (90 Base) MCG/ACT inhaler  Commonly known as: Ventolin HFA  Inhale 2 puffs into the lungs 4 times daily as needed for Wheezing     cetirizine 10 MG tablet  Commonly known as: ZYRTEC     ibuprofen 600 MG tablet  Commonly known as: ADVIL;MOTRIN  Take 1 tablet by mouth every 6 hours as needed for Pain     losartan-hydroCHLOROthiazide 100-25 MG per tablet  Commonly known as: HYZAAR  TAKE 1 TABLET BY MOUTH ONE TIME A DAY           Where to Get Your Medications      You can get these medications from any pharmacy    Bring a paper prescription for each of these medications  · allopurinol 100 MG tablet  · colchicine 0.6 MG tablet  · oxyCODONE 5 MG immediate release tablet  · sulfamethoxazole-trimethoprim 800-160 MG per tablet          Activity: activity as tolerated  Diet: regular diet  Wound Care: keep wound clean and dry    Time Spent on discharge is more than 30 minutes    Signed:  Vinod Mayo MD, PGY-1  4/19/2022

## 2022-04-19 NOTE — CARE COORDINATION
CTN contacted Romy with [facility] [phone]. They have accepted this patient and will pull referral from [facility].  They will contact patient and make arrangements for [facility] by 4/20  Electronically signed by Dominik Vieira LPN on 4/76/4567 at 13:61 PM

## 2022-04-19 NOTE — CARE COORDINATION
Case Management Assessment            Discharge Note                    Date / Time of Note: 4/19/2022 12:04 PM                  Discharge Note Completed by: GARCIA Collins, STEVENSONW    Patient Name: Christopher Griffin   YOB: 1965  Diagnosis: Cellulitis and abscess of buttock [L02.31, L03.317]  Cellulitis [L03.90]  Tachycardia [R00.0]  Right thigh pain [M79.651]  Cellulitis of right thigh [T55.989]  Acute gout involving toe of right foot, unspecified cause [M10.9]   Date / Time: 4/17/2022  3:20 PM    Current PCP: FERNANDO Le CNP  Clinic patient: No    Hospitalization in the last 30 days: Yes    Advance Directives:  Code Status: Full Code  [place] DNR form completed and on chart: Yes    Financial:  Payor: Darlyn Bowen / Plan: [address] [place] [address] / Product Type: *No Product type* /      Pharmacy:    [address] [place] [address] 20, [address] [place] [address]  Phone: [phone] Fax: [phone]    [facility] [id number] [facility], [address] [place] [address] [phone] [facility] [phone]  [place] [address]  Phone: [phone] Fax: [phone]      Assistance purchasing medications?: Potential Assistance Purchasing Medications: No  Assistance provided by Case Management: None at this time    Does patient want to participate in local refill/ meds to beds program?: Yes    Meds To Beds General Rules:  1. Can ONLY be done Monday- Friday between 8:30am-5pm  2. Prescription(s) must be in pharmacy by 3pm to be filled same day  3. Copy of patient's insurance/ prescription drug card and patient face sheet must be sent along with the prescription(s)  4. Cost of Rx cannot be added to hospital bill. If financial assistance is needed, please contact unit  or ;   or  CANNOT provide pharmacy voucher for patients co-pays  5. Patients can then  the prescription on their way out of the hospital at discharge, or pharmacy can deliver to the bedside if staff is available. (payment due at time of pick-up or delivery - cash, check, or card accepted)     Able to afford home medications/ co-pay costs: Yes    ADLS:  Current PT AM-PAC Score:   /24  Current OT AM-PAC Score:   /24      DISCHARGE Disposition: Home with [address]: RN - wound care     LOC at discharge: Not Applicable  YESICA Completed: Yes    Notification completed in HENS/PAS?:  Not Applicable    IMM Completed:   Not Indicated    Transportation:  Transportation PLAN for discharge: family   Mode of Transport: [address]:  [address] ordered at discharge: Yes  [address]: [employer]  Phone: [phone]  Fax: [phone]  Orders faxed: Yes    Durable Medical Equipment:  Equipment obtained during hospitalization: NA    Home Oxygen and Respiratory Equipment:  Oxygen needed at discharge?: No    Dialysis:  Dialysis patient: No    Referrals made at [facility] for outpatient continued care:  Not Applicable    Additional CM Notes:  CM met with pt at bedside to confirm DC plan. Pt will DC to her sister's Carly Nash) home for a few days, then home. [facility] liaison following to coordinate [facility] - RN for wound care. Family will transport home. No other needs at this time.         The Plan for Transition of Care is related to the following treatment goals of Cellulitis and abscess of buttock [L02.31, L03.317]  Cellulitis [L03.90]  Tachycardia [R00.0]  Right thigh pain [M79.651]  Cellulitis of right thigh [N22.434]  Acute gout involving toe of right foot, unspecified cause [M10.9]    The Patient and/or patient representative Osmel Bernabe and her family were provided with a choice of provider and agrees with the discharge plan Yes    Freedom of choice list was provided with basic dialogue that supports the patient's individualized plan of care/goals and shares the quality data associated with the providers.  Yes    Care Transitions patient: Yes    GARCIA Bernabe, [facility]  Case Management Department  Ph: [phone]  Fax: [phone]

## 2022-04-19 NOTE — PROGRESS NOTES
Pt IV medication order has no fallen off previous to being scanned and given, due to this, remainder of med to be wasted and we will message MD to get new order

## 2022-04-20 LAB
GRAM STAIN RESULT: NORMAL
WOUND/ABSCESS: NORMAL

## 2022-04-21 ENCOUNTER — TELEPHONE (OUTPATIENT)
Dept: FAMILY MEDICINE CLINIC | Age: 57
End: 2022-04-21

## 2022-04-21 LAB
BLOOD CULTURE, ROUTINE: NORMAL
CULTURE, BLOOD 2: NORMAL

## 2022-04-21 NOTE — TELEPHONE ENCOUNTER
She had an I&D to her right groin- Dr. Magdalena Montague did surgery. Further concerns should be routed to him about wound care management.

## 2022-04-21 NOTE — TELEPHONE ENCOUNTER
Message given to Bala dickinson/MICHAEL that wound care management questions/concerns should be directed to Dr. Yordy Jeffrey his surgeon.

## 2022-04-21 NOTE — TELEPHONE ENCOUNTER
Nurse  from [facility] calling  Pt has home health care coming today  What kind of wound care will the pt be receiving? Any plan of care needs or concerns?

## 2022-04-26 ENCOUNTER — TELEPHONE (OUTPATIENT)
Dept: SURGERY | Age: 57
End: 2022-04-26

## 2022-04-26 ENCOUNTER — OFFICE VISIT (OUTPATIENT)
Dept: SURGERY | Age: 57
End: 2022-04-26

## 2022-04-26 ENCOUNTER — TELEPHONE (OUTPATIENT)
Dept: FAMILY MEDICINE CLINIC | Age: 57
End: 2022-04-26

## 2022-04-26 VITALS
TEMPERATURE: 97.4 F | HEART RATE: 88 BPM | OXYGEN SATURATION: 100 % | DIASTOLIC BLOOD PRESSURE: 66 MMHG | SYSTOLIC BLOOD PRESSURE: 117 MMHG | HEIGHT: 67 IN | BODY MASS INDEX: 29.66 KG/M2 | WEIGHT: 189 LBS

## 2022-04-26 DIAGNOSIS — L02.214 ABSCESS OF RIGHT GROIN: Primary | ICD-10-CM

## 2022-04-26 PROCEDURE — 99024 POSTOP FOLLOW-UP VISIT: CPT | Performed by: SURGERY

## 2022-04-26 NOTE — TELEPHONE ENCOUNTER
[facility] wants [facility] to know the pt is requesting to be equested to be discharged from the facility

## 2022-04-26 NOTE — PROGRESS NOTES
[facility] COLORECTAL SURGERY  [address] [place] [address],[facility]  Dept: [phone]  Dept Fax: [phone]  Loc: [phone]    Visit Date: 4/26/2022    Zoë Guzmán is a 56 y.o. female who presents today for: Post-Op Check (Thigh abscess )      Subjective: Zoë Guzmán is a 56 y.o. female here for postoperative visit after incision and drainage of right thigh/groin abscess. Still quite tender, but overall feeling much better. Patient's problem list, medications, past medical, surgical, family, and social histories were reviewed and updated in the chart as indicated today. Objective:     /66   Pulse 88   Temp 97.4 °F (36.3 °C) (Infrared)   Ht 5' 7\" (1.702 m)   Wt 189 lb (85.7 kg)   SpO2 100%   BMI 29.60 kg/m²     Abdominal/wound: Good granulation tissue, no signs of recurrent infection    Assessment/Plan:       ASSESSMENT/PLAN:    2-week status post incision and drainage of right groin abscess. Healing nicely. Warned her that cigarette smoking is the likely risk factor for recurrence and slow wound healing. DISPOSITION: Follow-up in 2 to 3 weeks for wound check    Note completed using dictation software, please excuse any errors. Referring/primary care physician updated through Epic note if PCP was listed.     Electronically signed by Najma Rutledge MD on 4/26/2022 at 10:09 AM

## 2022-05-03 ENCOUNTER — TELEPHONE (OUTPATIENT)
Dept: SURGERY | Age: 57
End: 2022-05-03

## 2022-05-03 NOTE — TELEPHONE ENCOUNTER
Patient called in stating that [employer] faxed over a questionnaire that needs to be filled out entirely and faxed back to [facility]    Please contact the patient with any questions at [phone]

## 2022-05-03 NOTE — TELEPHONE ENCOUNTER
Patient called stating [employer] sent her letter saying the [facility] paperwork was not filled out entirely.     Please call patient with any questions: [phone]

## 2022-05-09 ENCOUNTER — TELEPHONE (OUTPATIENT)
Dept: SURGERY | Age: 57
End: 2022-05-09

## 2022-05-09 NOTE — TELEPHONE ENCOUNTER
PT called in saying that the FMLA form was incomplete and diagnosis was not filled in. She states her FMLA is almost up and that short term disability reached out stating that she needs it filled out. Please call patient back at [phone]. Thanks!

## 2022-05-10 ENCOUNTER — OFFICE VISIT (OUTPATIENT)
Dept: SURGERY | Age: 57
End: 2022-05-10
Payer: COMMERCIAL

## 2022-05-10 VITALS
WEIGHT: 184 LBS | TEMPERATURE: 97.4 F | BODY MASS INDEX: 28.88 KG/M2 | OXYGEN SATURATION: 100 % | HEIGHT: 67 IN | DIASTOLIC BLOOD PRESSURE: 82 MMHG | SYSTOLIC BLOOD PRESSURE: 128 MMHG | HEART RATE: 103 BPM

## 2022-05-10 DIAGNOSIS — L02.214 ABSCESS OF RIGHT GROIN: Primary | ICD-10-CM

## 2022-05-10 PROCEDURE — 99213 OFFICE O/P EST LOW 20 MIN: CPT | Performed by: SURGERY

## 2022-05-10 NOTE — PROGRESS NOTES
[facility] COLORECTAL SURGERY  [address]  Dept: [phone]  Dept Fax: [phone]  Loc: [phone]    Visit Date: 5/10/2022    Janneth Deluna is a 56 y.o. female who presents today for: Follow-up (Abscess on groin)      HPI:       Janneth Deluna is a 56 y.o. female known to me for incision drainage of groin abscess. Overall improving. Still a bit tender, but no fevers or chills. Continues to smoke.     Past Medical History:   Diagnosis Date    Allergic rhinitis     Cardiomyopathy ([facility] [address]     Depression     Gout     Hypertension      Past Surgical History:   Procedure Laterality Date    APPENDECTOMY      COLONOSCOPY  11/5/2021    COLONOSCOPY POLYPECTOMY SNARE/COLD BIOPSY performed by Sylvester Sarabia MD at [address] [place] [address] 4/15/2022    RIGHT UPPER THIGH INCISION AND DRAINAGE performed by Miriam Tay MD at [facility]         Current Outpatient Medications:     allopurinol (ZYLOPRIM) 100 MG tablet, Take 1 tablet by mouth daily, Disp: 30 tablet, Rfl: 0    losartan-hydroCHLOROthiazide (HYZAAR) 100-25 MG per tablet, TAKE 1 TABLET BY MOUTH ONE TIME A DAY, Disp: 90 tablet, Rfl: 1    ibuprofen (ADVIL;MOTRIN) 600 MG tablet, Take 1 tablet by mouth every 6 hours as needed for Pain, Disp: 30 tablet, Rfl: 0    vitamin D (ERGOCALCIFEROL) 1.25 MG (95533 UT) CAPS capsule, Take 1 capsule by mouth once a week (Patient taking differently: Take 50,000 Units by mouth once a week Saturdays), Disp: 4 capsule, Rfl: 5    albuterol sulfate HFA (VENTOLIN HFA) 108 (90 Base) MCG/ACT inhaler, Inhale 2 puffs into the lungs 4 times daily as needed for Wheezing, Disp: 3 Inhaler, Rfl: 1    cetirizine (ZYRTEC) 10 MG tablet, Take 10 mg by mouth daily As needed, Disp: , Rfl:     colchicine (COLCRYS) 0.6 MG tablet, Take 1 tablet by mouth daily for 7 days, Disp: 30 tablet, Rfl: 0  Allergies   Allergen Reactions    Lisinopril      COUGH     Past Surgical History:   Procedure Laterality Date    APPENDECTOMY      COLONOSCOPY  11/5/2021    COLONOSCOPY POLYPECTOMY SNARE/COLD BIOPSY performed by Serena Combs MD at [address] [place] [address] 4/15/2022    RIGHT UPPER THIGH INCISION AND DRAINAGE performed by Gary Campbell MD at [facility]       Family History   Problem Relation Age of Onset    Diabetes Mother    Ortiz Rheum Arthritis Mother     Diabetes Father     Diabetes Other     High Blood Pressure Other     Other Other         CVA    Lupus Sister     Rheum Arthritis Sister     Diabetes Maternal Grandmother     Hypertension Maternal Grandmother     Diabetes Maternal Grandfather     Hypertension Maternal Grandfather     No Known Problems Paternal Grandmother     No Known Problems Paternal Grandfather     Heart Disease Neg Hx        Social History:   Social History     Tobacco Use    Smoking status: Current Every Day Smoker     Packs/day: 0.20     Years: 31.00     Pack years: 6.20     Types: Cigarettes    Smokeless tobacco: Never Used   Substance Use Topics    Alcohol use: Yes     Alcohol/week: 2.0 standard drinks     Types: 2 Glasses of wine per week     Comment: OCC      Tobacco cessation counseling provided as appropriate. REVIEW OF SYSTEMS:    Pertinent positives and negatives are mentioned in the HPI. Otherwise, all other systems were reviewed and negative. Objective:     Physical Exam   /82   Pulse 103   Temp 97.4 °F (36.3 °C) (Infrared)   Ht 5' 7\" (1.702 m)   Wt 184 lb (83.5 kg)   SpO2 100%   BMI 28.82 kg/m²   Constitutional: Appears well-developed and well-nourished. Grooming appropriate. No gross deformities. Body mass index is 28.82 kg/m². Eyes: No scleral icterus. Conjunctiva/lids normal. Vision intact grossly.  Pupils equal/symmetric, reactive bilaterally. ENT: External ears/nose without defect, scars, or masses. Hearing grossly intact. No facial deformity. Lips normal, normal dentition. Neck: No masses. Trachea midline. No crepitus. Thyroid not enlarged. Cardiovascular: Normal rate. No peripheral edema. Abdominal aorta normal size to palpation. Pulmonary/Chest: Effort normal. No respiratory distress. No wheezes. No use of accessory muscles. Musculoskeletal: Normal range of motion of head/neck, without deformity, pain, or crepitus, with normal strength and tone. Normal gait. Nails without clubbing or cyanosis. Neurological: Alert and oriented to person, place, and time. No gross deficits. Sensation intact. Skin: Skin is dry. No rashes noted. No pallor. No induration of nodules. Psychiatric: Normal mood and affect. Behavior normal. Oriented to person, place, and time. Judgment and insight reasonable. Abdominal/wound: Right groin/upper thigh abscess healing nicely, good granulation tissue, no signs of recurrent infection    Labs reviewed: none     Imaging reviewed: none    Assessment/Plan:       A/P:  Established problem(s): Right groin/thigh abscess, improving; smoker  Additional workup/treatment planned: Continue with dressing changes, quit smoking  Risk of complications/morbidity: Moderate    Overall slowly healing from her right groin/thigh abscess. Good granulation tissue with no signs of recurrent infection. Advised to continue with local wound care and dressings. Advised that her continued smoking can increase her chance of recurrence and slow down her wound healing.     DISPOSITION:  F/u PRN    My findings will be relayed to consulting practitioner or PCP via Epic note    Total encounter time:  20 min, including any number of the following: review of labs, imaging, provider notes, outside hospital records; performing examination/evaluation; counseling patient and family; ordering medications/tests; placing referrals and communication with referring physicians; coordination of care, and documentation in the EHR. Note completed using dictation software, please excuse any errors.     Electronically signed by Paul Souza MD on 5/10/2022 at 11:49 AM

## 2022-05-12 RX ORDER — ERGOCALCIFEROL 1.25 MG/1
50000 CAPSULE ORAL WEEKLY
Qty: 4 CAPSULE | Refills: 5 | Status: SHIPPED | OUTPATIENT
Start: 2022-05-12

## 2022-05-21 NOTE — DISCHARGE SUMMARY
Discharge Summary      Patient: Dominik Daniels    Admit Date:   4/15/2022 12:07 PM    Discharge Date:   4/16/2022  4:10 PM     Admitting Physician:   Paul Souza MD     Discharge Physician:   same    Admitting Diagnosis:  Abscess of right thigh     Discharge Diagnosis:   same     Past Medical History:   Diagnosis Date    Allergic rhinitis     Cardiomyopathy ([address]     Depression     Gout     Hypertension         Indication for Admission: This is a 72-year-old male with past medical history of hypertension, cardiomyopathy, and gout who was seen at [facility] on 3/27/2022 for right groin abscess, where incision and drainage was performed per stab incision. Since then the patient developed excruciating pain, went to see her PCP, who sent her to the colorectal office. At that time she was extremely tender on exam, so was booked day of for urgent incision and drainage in the OR. She was admitted for perioperative management. Hospital Course:   Under general anesthesia, the patient's right upper medial thigh and right labia were prepped, the former stab incision site was opened with 5 x 3 cm unroofing of abscess cavity, which was packed. The patient did well intraoperatively, was awakened, extubated, and taken to PACU in stable condition. The patient did well with some tachycardia postoperatively, but was otherwise stable. The packing was changed, and patient was stable and appropriate for discharge home later that day. Discharge physical exam:  General appearance: alert, no acute distress, grooming appropriate  Eyes: No scleral icterus, EOM grossly intact  Neck: trachea midline, no JVD, no lymphadenopathy, neck supple  Chest/Lungs: Equal excursion bilaterally, normal effort with no accessory muscle use on 2L NC  Cardiovascular: RRR, brisk capillary refill  Abdomen: Soft, non-tender, non-distended, no rebound, guarding, or rigidity.   Skin: warm and dry, no rashes  Extremities: no edema, no cyanosis;  Right groin wound with packing in place  Neuro: A&Ox3, no focal deficits, sensation intact    Disposition:    Home    Condition at discharge:  Stable    Discharge Instructions:  See separate form    Patient Instructions:      Medication List      CONTINUE taking these medications    albuterol sulfate  (90 Base) MCG/ACT inhaler  Commonly known as: Ventolin HFA  Inhale 2 puffs into the lungs 4 times daily as needed for Wheezing     cetirizine 10 MG tablet  Commonly known as: ZYRTEC     ibuprofen 600 MG tablet  Commonly known as: ADVIL;MOTRIN  Take 1 tablet by mouth every 6 hours as needed for Pain     losartan-hydroCHLOROthiazide 100-25 MG per tablet  Commonly known as: HYZAAR  TAKE 1 TABLET BY MOUTH ONE TIME A DAY        STOP taking these medications    vitamin D 1.25 MG (54171 UT) Caps capsule  Commonly known as: ERGOCALCIFEROL            Fouzia Pat DO  05/21/22  3:35 PM

## 2022-06-03 RX ORDER — ALLOPURINOL 100 MG/1
100 TABLET ORAL DAILY
Qty: 30 TABLET | Refills: 0 | Status: SHIPPED | OUTPATIENT
Start: 2022-06-03 | End: 2022-09-06 | Stop reason: ALTCHOICE

## 2022-06-03 RX ORDER — COLCHICINE 0.6 MG/1
0.6 TABLET ORAL DAILY
Qty: 30 TABLET | Refills: 0 | Status: SHIPPED | OUTPATIENT
Start: 2022-06-03 | End: 2022-06-06

## 2022-06-03 RX ORDER — COLCHICINE 0.6 MG/1
0.6 TABLET ORAL DAILY
Qty: 30 TABLET | Refills: 0 | Status: SHIPPED | OUTPATIENT
Start: 2022-06-03 | End: 2022-06-03 | Stop reason: SDUPTHER

## 2022-06-03 NOTE — TELEPHONE ENCOUNTER
Pt would like rx below and prednisone for the swelling and pain. Please call pt when order is placed.     LOV: 4/15/22

## 2022-06-06 ENCOUNTER — TELEPHONE (OUTPATIENT)
Dept: FAMILY MEDICINE CLINIC | Age: 57
End: 2022-06-06

## 2022-06-06 RX ORDER — COLCHICINE 0.6 MG/1
0.6 TABLET ORAL DAILY
Qty: 30 TABLET | Refills: 0 | Status: SHIPPED | OUTPATIENT
Start: 2022-06-06 | End: 2022-07-13

## 2022-06-06 NOTE — TELEPHONE ENCOUNTER
[id number] Zonia Johnson called for clarification on colchicine 0.6 mg.    Rx says pt is taking 1 tablet daily for 7 days, but qty is 30. Please call pharmacy.     LOV 04/15/2022

## 2022-06-08 ENCOUNTER — TELEPHONE (OUTPATIENT)
Dept: FAMILY MEDICINE CLINIC | Age: 57
End: 2022-06-08

## 2022-06-08 NOTE — TELEPHONE ENCOUNTER
1.2 mg at the first sign of a gout flare, followed in 1 hour with a single dose of 0.6 mg, she can then take 0.6 mg daily until the flare resolves.

## 2022-06-08 NOTE — TELEPHONE ENCOUNTER
Pharmacy called colchicine, 0.6 mg, questions on qty, directions and the days supplied.     LOV 04/15/2022

## 2022-06-08 NOTE — TELEPHONE ENCOUNTER
Called and spoke with pharmacy. Pharmacy wants to know how many days pt is to be taking this medication. I read provider note verbatim and pharmacy asking how long pt is to take this medication. Stated that its written for 7 days. Please advise.

## 2022-07-13 RX ORDER — COLCHICINE 0.6 MG/1
TABLET ORAL
Qty: 30 TABLET | Refills: 0 | Status: SHIPPED | OUTPATIENT
Start: 2022-07-13

## 2022-08-01 ENCOUNTER — HOSPITAL ENCOUNTER (OUTPATIENT)
Dept: MAMMOGRAPHY | Age: 57
Discharge: HOME | End: 2022-08-01
Payer: COMMERCIAL

## 2022-08-01 VITALS — WEIGHT: 185 LBS | BODY MASS INDEX: 29.03 KG/M2 | HEIGHT: 67 IN

## 2022-08-01 DIAGNOSIS — Z12.31 ENCOUNTER FOR SCREENING MAMMOGRAM FOR MALIGNANT NEOPLASM OF BREAST: ICD-10-CM

## 2022-08-01 PROCEDURE — 77067 SCR MAMMO BI INCL CAD: CPT

## 2022-08-04 ENCOUNTER — TELEPHONE (OUTPATIENT)
Dept: FAMILY MEDICINE CLINIC | Age: 57
End: 2022-08-04

## 2022-08-22 ENCOUNTER — HOSPITAL ENCOUNTER (OUTPATIENT)
Dept: MAMMOGRAPHY | Age: 57
Discharge: HOME | End: 2022-08-22
Payer: COMMERCIAL

## 2022-08-22 ENCOUNTER — HOSPITAL ENCOUNTER (OUTPATIENT)
Dept: ULTRASOUND IMAGING | Age: 57
Discharge: HOME | End: 2022-08-22
Payer: COMMERCIAL

## 2022-08-22 DIAGNOSIS — R92.8 ABNORMAL MAMMOGRAM: ICD-10-CM

## 2022-08-22 PROCEDURE — 76642 ULTRASOUND BREAST LIMITED: CPT

## 2022-08-22 PROCEDURE — G0279 TOMOSYNTHESIS, MAMMO: HCPCS

## 2022-08-23 RX ORDER — LOSARTAN POTASSIUM AND HYDROCHLOROTHIAZIDE 12.5; 5 MG/1; MG/1
TABLET ORAL
Qty: 180 TABLET | Refills: 1 | OUTPATIENT
Start: 2022-08-23

## 2022-08-23 NOTE — TELEPHONE ENCOUNTER
Called and spoke to pt. Verified her dosage for Losartan-Hydrochlorothiazide. She stated it is 100-25mg, once daily. She stated at this time, she don't need any refills.

## 2022-08-24 RX ORDER — LOSARTAN POTASSIUM AND HYDROCHLOROTHIAZIDE 12.5; 5 MG/1; MG/1
TABLET ORAL
Qty: 180 TABLET | Refills: 1 | OUTPATIENT
Start: 2022-08-24

## 2022-08-29 ENCOUNTER — HOSPITAL ENCOUNTER (OUTPATIENT)
Dept: MAMMOGRAPHY | Age: 57
Discharge: HOME | End: 2022-08-29
Payer: COMMERCIAL

## 2022-08-29 ENCOUNTER — HOSPITAL ENCOUNTER (OUTPATIENT)
Dept: ULTRASOUND IMAGING | Age: 57
Discharge: HOME | End: 2022-08-29
Payer: COMMERCIAL

## 2022-08-29 DIAGNOSIS — N63.0 BREAST MASS: ICD-10-CM

## 2022-08-29 PROCEDURE — 77065 DX MAMMO INCL CAD UNI: CPT

## 2022-08-29 PROCEDURE — 88305 TISSUE EXAM BY PATHOLOGIST: CPT

## 2022-08-29 PROCEDURE — 2709999900 US BREAST BIOPSY W LOC DEVICE 1ST LESION RIGHT

## 2022-09-06 ENCOUNTER — OFFICE VISIT (OUTPATIENT)
Dept: CARDIOLOGY CLINIC | Age: 57
End: 2022-09-06
Payer: COMMERCIAL

## 2022-09-06 VITALS
OXYGEN SATURATION: 99 % | HEIGHT: 67 IN | DIASTOLIC BLOOD PRESSURE: 62 MMHG | SYSTOLIC BLOOD PRESSURE: 120 MMHG | HEART RATE: 82 BPM | BODY MASS INDEX: 29.98 KG/M2 | WEIGHT: 191 LBS

## 2022-09-06 DIAGNOSIS — I42.8 OTHER CARDIOMYOPATHY (HCC): Primary | ICD-10-CM

## 2022-09-06 DIAGNOSIS — I10 PRIMARY HYPERTENSION: ICD-10-CM

## 2022-09-06 PROCEDURE — 99213 OFFICE O/P EST LOW 20 MIN: CPT | Performed by: NURSE PRACTITIONER

## 2022-09-06 NOTE — PROGRESS NOTES
[address]     Outpatient Follow Up Note    Chandra Rashid is 56 y.o. female who presents today with a history of HTN and CM    CHIEF COMPLAINT / HPI:  Follow Up secondary to CM & HTN. Subjective:     She denies significant chest discomfort but gets little cramps in her left chest wall. She has to exhale to push it out. Its different than that of her heartburn and takes omeprazole  There is no SOB/DELUNA. She has times when she swells attributed to salt intake. The patient is not experiencing palpitations or dizziness. These symptoms show no change since the last OV. With regard to medication therapy the patient has been compliant with prescribed regimen. They have tolerated therapy to date. Past Medical History:   Diagnosis Date    Allergic rhinitis     Cardiomyopathy ([facility] [address]     Depression     Gout     Hypertension      Social History:    Social History     Tobacco Use   Smoking Status Every Day    Packs/day: 0.20    Years: 31.00    Pack years: 6.20    Types: Cigarettes   Smokeless Tobacco Never     Current Medications:  Current Outpatient Medications   Medication Sig Dispense Refill    vitamin D (ERGOCALCIFEROL) 1.25 MG (93653 UT) CAPS capsule TAKE 1 CAPSULE BY MOUTH ONCE A WEEK 4 capsule 5    losartan-hydroCHLOROthiazide (HYZAAR) 100-25 MG per tablet TAKE 1 TABLET BY MOUTH ONE TIME A DAY 90 tablet 1                  albuterol sulfate HFA (VENTOLIN HFA) 108 (90 Base) MCG/ACT inhaler Inhale 2 puffs into the lungs 4 times daily as needed for Wheezing (Patient not taking: Reported on 9/6/2022) 3 Inhaler 1       REVIEW OF SYSTEMS:    CONSTITUTIONAL: No major weight gain or loss, fatigue, weakness, night sweats or fever. HEENT: No new vision difficulties or ringing in the ears. RESPIRATORY: No new SOB, PND, orthopnea or cough. CARDIOVASCULAR: See HPI  GI: No nausea, vomiting, diarrhea, constipation, abdominal pain or changes in bowel habits.   : No urinary frequency, urgency, incontinence hematuria or dysuria. SKIN: No cyanosis or skin lesions. MUSCULOSKELETAL: No new muscle or joint pain. NEUROLOGICAL: No syncope or TIA-like symptoms. PSYCHIATRIC: No anxiety, pain, insomnia or depression    Objective:   PHYSICAL EXAM:        Vitals:    09/06/22 1459 09/06/22 1520   BP: 130/70 120/62   Site: Right Upper Arm    Position: Sitting    Cuff Size: Medium Adult    Pulse: 82    SpO2: 99%    Weight: 191 lb (86.6 kg)    Height: 5' 7\" (1.702 m)        VITALS:  /70 (Site: Right Upper Arm, Position: Sitting, Cuff Size: Medium Adult)   Pulse 82   Ht 5' 7\" (1.702 m)   Wt 191 lb (86.6 kg)   SpO2 99%   BMI 29.91 kg/m²   CONSTITUTIONAL: Cooperative, no apparent distress, and appears well nourished / developed  NEUROLOGIC:  Awake and orientated to person, place and time. PSYCH: Calm affect. SKIN: Warm and dry. HEENT: Sclera non-icteric, normocephalic, neck supple, no elevation of JVP, normal carotid pulses with no bruits and thyroid normal size. LUNGS:  No increased work of breathing and clear to auscultation, no crackles or wheezing  CARDIOVASCULAR:  Regular rate and rhythm with no murmurs, gallops, rubs, or abnormal heart sounds, normal PMI. The apical impulses not displaced  JVP less than 8 cm H2O  Heart tones are crisp and normal  Cervical veins are not engorged  The carotid upstroke is normal in amplitude and contour without delay or bruit  JVP is not elevated  ABDOMEN:  Normal bowel sounds, non-distended and non-tender to palpation  EXT: No edema, no calf tenderness. Pulses are present bilaterally.     DATA:    Lab Results   Component Value Date    ALT 14 04/17/2022    AST 15 04/17/2022    ALKPHOS 99 04/17/2022    BILITOT 0.7 04/17/2022     Lab Results   Component Value Date    CREATININE 1.2 (H) 04/19/2022    CREATININE 1.2 (H) 04/19/2022    BUN 15 04/19/2022    BUN 15 04/19/2022     04/19/2022     04/19/2022    K 4.1 04/19/2022    K 4.1 04/19/2022     04/19/2022     04/19/2022    CO2 25 04/19/2022    CO2 26 04/19/2022       No results found for: TRIG  Lab Results   Component Value Date    HDL 48 03/14/2022    HDL 38 (L) 07/29/2021     Lab Results   Component Value Date    LDLCALC 139 (H) 03/14/2022    LDLCALC 126 (H) 07/29/2021     Lab Results   Component Value Date    LABVLDL 36 03/14/2022    LABVLDL 45 07/29/2021       Radiology Review:  Pertinent images / reports were reviewed as a part of this visit and reveals the following:    Last Echo: Jan '21  Summary   Left ventricular cavity size is normal with normal left ventricular wall   thickness. Overall left ventricular systolic function appears mildly reduced. Ejection fraction is visually estimated to be 50%. There is mild diffuse hypokinesis. Grade I diastolic dysfunction with normal LV filling pressures. Mitral valve leaflets appear mildly thickened. Mild mitral annular calcification. Mild mitral regurgitation. Aortic valve appears sclerotic but opens adequately. Trivial tricuspid regurgitation. Trivial pulmonic regurgitation present. Last Stress Test: 6/12/2017   Summary    -Normal myocardial perfusion. -LV function is moderately reduced with global hypokinesis and ejection    fraction of 38 %. 24 hour Holter Monitor 12/18  Sinus Rhythm. One run of Mobitz I        Assessment:      Diagnosis Orders   1.                 2. Cardiomyopathy, other  ~stable : LVEF 50% on echo '21  ~no signs of volume overload  ~remains on Hyzaar  ECHO 5/26/17> EF 30-35% and 37%   ECHO 10/9/17> EF improved to 45%. ECHO  12/14/18> EF 45-50%. Primary Hypertension  ~controlled on Hyzaar  ~grade I diastolic dysfunction      Tobacco dependence  ~suboptimal as continues to smoke >> goal to stop this week          I had the opportunity to review the clinical symptoms and presentation of Mendy Hartman. Plan:     1. Continue present management   2.  F/U in six months with Dr. Warden Montague    Overall the patient is stable from CV standpoint    I have addresed the patient's cardiac risk factors and adjusted pharmacologic treatment as needed. In addition, I have reinforced the need for patient directed risk factor modification. Further evaluation will be based upon the patient's clinical course and testing results. All questions and concerns were addressed to the patient Alternatives to my treatment were discussed. The patient is currently smoking : ~ 1 pk over three weeks with goals to stop this week. The risks related to smoking were reviewed with the patient. Recommend maintaining a smoke-free lifestyle. Products available for smoking cessation were discussed     Patient is not on a beta-blocker : neg MI  Patient is on an ace-i/ARB  Patient isnot on a statin: neg hx CAD    Dual Antiplatelet therapy has not been recommended / prescribed for this patient. Angiotension inhibitor/angiotension receptor blocker has been prescribed / recommended for congestive heart failure. Daily weight, low sodium diet were discussed. Patient instructed to call the office with a weight gain: > 3 # over night or 5# in one week; swelling, SOB/orthopnea/PND    The patient verbalizes understanding not to stop medications without discussing with us. Discussed exercise: 30-60 minutes 7 days/week. Has no routine  Discussed EULALIO diet. Thank you for allowing to us to participate in the care of Dann Guaman.     FERNANDO Bailey      Documentation of today's visit sent to PCP

## 2023-01-03 RX ORDER — ERGOCALCIFEROL 1.25 MG/1
CAPSULE ORAL
Qty: 4 CAPSULE | Refills: 5 | Status: SHIPPED | OUTPATIENT
Start: 2023-01-03

## 2023-02-28 RX ORDER — LOSARTAN POTASSIUM AND HYDROCHLOROTHIAZIDE 12.5; 5 MG/1; MG/1
TABLET ORAL
Qty: 180 TABLET | Refills: 0 | Status: SHIPPED | OUTPATIENT
Start: 2023-02-28

## 2023-03-22 NOTE — PROGRESS NOTES
grandmother; Lupus in her sister; No Known Problems in her paternal grandfather and paternal grandmother; Other in an other family member; Rheum Arthritis in her mother and sister. Home Medications:  Outpatient Medications Marked as Taking for the 3/24/23 encounter (Office Visit) with Roseann Granda MD   Medication Sig Dispense Refill    losartan-hydroCHLOROthiazide (HYZAAR) 50-12.5 MG per tablet TAKE TWO TABLETS BY MOUTH EVERY  tablet 0    vitamin D (ERGOCALCIFEROL) 1.25 MG (41211 UT) CAPS capsule TAKE 1 CAPSULE BY MOUTH ONE TIME WEEKLY 4 capsule 5    colchicine (COLCRYS) 0.6 MG tablet TAKE TWO TABLETS (1.2 MG) BY MOUTH AT THE FIRST SIGN OF A FLARE FOLLOWED BY ONE ADDITIONAL TABLET ONE HOUR LATER. THEN TAKE ONE TABLET BY MOUTH DAILY AS NEEDED UNTIL RESOLVED. MAX 3 TABLETS DAILY 30 tablet 0    albuterol sulfate HFA (VENTOLIN HFA) 108 (90 Base) MCG/ACT inhaler Inhale 2 puffs into the lungs 4 times daily as needed for Wheezing 3 Inhaler 1    cetirizine (ZYRTEC) 10 MG tablet Take 10 mg by mouth daily As needed       Allergies:  Lisinopril     Review of Systems:   Constitutional: there has been no unanticipated weight loss. There's been no change in energy level, sleep pattern, or activity level. Eyes: No visual changes or diplopia. No scleral icterus. ENT: No Headaches, hearing loss or vertigo. No mouth sores or sore throat. Cardiovascular: Reviewed in HPI  Respiratory: No cough or wheezing, no sputum production. No hematemesis. Gastrointestinal: No abdominal pain, appetite loss, blood in stools. No change in bowel or bladder habits. Genitourinary: No dysuria, trouble voiding, or hematuria. Musculoskeletal:  No gait disturbance, weakness or joint complaints. Integumentary: No rash or pruritis. Neurological: No headache, diplopia, change in muscle strength, numbness or tingling. No change in gait, balance, coordination, mood, affect, memory, mentation, behavior.     Psychiatric: No anxiety, no
delay or bruit  No M/G/R  There is no clubbing, cyanosis of the extremities. No edema  Femoral Arteries: 2+ and equal  Pedal Pulses: 2+ and equal   Abdomen:  No masses or tenderness  Bowel sounds present  No organomegaly appreciated  Neurological/Psychiatric:  Alert and oriented in all spheres  Moves all extremities well  Exhibits normal gait balance and coordination  No abnormalities of mood, affect, memory, mentation, or behavior are noted    2D ECHO 12/14/18:  -Left ventricular size is at upper limits of normal .   -Normal left ventricular wall thickness. -Global ejection fraction is mildly decreased and estimated from 45-50% . -Global hypokinesis noted. E/e'= 7.85   -There is reversal of E/A inflow velocities across the mitral valve. -Mild mitral regurgitation is present.   -There is mild tricuspid regurgitation with RVSP estimated at 24 mmHg. Assessment:     1. Cardiomyopathy ([address]): Stable, compensated. Echo yearly. ECHO 5/26/17> EF 30-35% and 37% by 3D. RVSP 48 mmHg. ECHO 10/9/17> EF improved to 45%. ECHO  12/14/18> EF 45-50%. ECHO 3/06/20> EF 55%   ECHO 1/25/21> EF 50%    2. SOB (shortness of breath on exertion), chronic: Stable     3. Hypertension: Stable. Blood pressure 126/72, pulse 98, height 5' 7\" (1.702 m), weight 196 lb (88.9 kg), SpO2 97 %    4. Tobacco dependence: Smoking cessation encouraged. Plan: Mecca Balderas has a stable cardiac status. Cardiac test and lab results personally reviewed by me during this office visit and discussed. No med changes. Continue risk factor modifications. Call for any change in symptoms. Return for regular follow up in ***. I appreciate the opportunity of cooperating in the care of this individual.    Frida [facility]    Patient's problem list, medications, allergies, past medical, surgical, social and family histories were reviewed and updated as appropriate. Scribe's attestation:  This note was scribed in

## 2023-03-24 ENCOUNTER — OFFICE VISIT (OUTPATIENT)
Dept: CARDIOLOGY CLINIC | Age: 58
End: 2023-03-24
Payer: COMMERCIAL

## 2023-03-24 VITALS
SYSTOLIC BLOOD PRESSURE: 110 MMHG | WEIGHT: 191 LBS | BODY MASS INDEX: 29.98 KG/M2 | OXYGEN SATURATION: 99 % | DIASTOLIC BLOOD PRESSURE: 76 MMHG | HEIGHT: 67 IN | HEART RATE: 97 BPM

## 2023-03-24 DIAGNOSIS — I10 ESSENTIAL HYPERTENSION: ICD-10-CM

## 2023-03-24 DIAGNOSIS — R06.09 DOE (DYSPNEA ON EXERTION): ICD-10-CM

## 2023-03-24 DIAGNOSIS — I42.8 OTHER CARDIOMYOPATHY (HCC): Primary | ICD-10-CM

## 2023-03-24 DIAGNOSIS — Z72.0 TOBACCO ABUSE: ICD-10-CM

## 2023-03-24 PROCEDURE — 3078F DIAST BP <80 MM HG: CPT | Performed by: INTERNAL MEDICINE

## 2023-03-24 PROCEDURE — 99214 OFFICE O/P EST MOD 30 MIN: CPT | Performed by: INTERNAL MEDICINE

## 2023-03-24 PROCEDURE — 3074F SYST BP LT 130 MM HG: CPT | Performed by: INTERNAL MEDICINE

## 2023-03-24 NOTE — PATIENT INSTRUCTIONS
No med changes  Follow up in 6 months Voicemail:     Patient mother Rosa Gift requesting call back regarding patients medication  Please advise  Thank you

## 2023-03-30 ENCOUNTER — TELEPHONE (OUTPATIENT)
Dept: FAMILY MEDICINE CLINIC | Age: 58
End: 2023-03-30

## 2023-03-30 NOTE — TELEPHONE ENCOUNTER
----- Message from Mervin Ortiz sent at 3/30/2023  9:27 AM EDT -----  Subject: Message to Provider    QUESTIONS  Information for Provider? Patient has not been to dentist in a long while,   has appt on 04/25 as a new patient. Says her gums are swollen and tender   on rt side, broke a tooth also same area. Pain without stimulation,   throbbing all day and night. Wonders if she can get an antibiotic for   this. I asked her to call her dentist as well. Please call patient.  ---------------------------------------------------------------------------  --------------  Zora Bosworth FUGZ  [phone]; OK to leave message on voicemail  ---------------------------------------------------------------------------  --------------  SCRIPT ANSWERS  Relationship to Patient?  Self

## 2023-04-03 ENCOUNTER — OFFICE VISIT (OUTPATIENT)
Dept: FAMILY MEDICINE CLINIC | Age: 58
End: 2023-04-03

## 2023-04-03 VITALS
TEMPERATURE: 97.1 F | SYSTOLIC BLOOD PRESSURE: 120 MMHG | HEIGHT: 67 IN | BODY MASS INDEX: 30.45 KG/M2 | HEART RATE: 65 BPM | OXYGEN SATURATION: 96 % | WEIGHT: 194 LBS | DIASTOLIC BLOOD PRESSURE: 76 MMHG

## 2023-04-03 DIAGNOSIS — I42.8 OTHER CARDIOMYOPATHY (HCC): ICD-10-CM

## 2023-04-03 DIAGNOSIS — Z00.00 ROUTINE GENERAL MEDICAL EXAMINATION AT A HEALTH CARE FACILITY: Primary | ICD-10-CM

## 2023-04-03 DIAGNOSIS — K04.7 TOOTH ABSCESS: ICD-10-CM

## 2023-04-03 DIAGNOSIS — E55.9 VITAMIN D DEFICIENCY: ICD-10-CM

## 2023-04-03 DIAGNOSIS — H61.23 IMPACTED CERUMEN OF BOTH EARS: ICD-10-CM

## 2023-04-03 DIAGNOSIS — M1A.0720 CHRONIC GOUT OF LEFT FOOT, UNSPECIFIED CAUSE: ICD-10-CM

## 2023-04-03 DIAGNOSIS — R73.03 PRE-DIABETES: ICD-10-CM

## 2023-04-03 DIAGNOSIS — H61.23 BILATERAL IMPACTED CERUMEN: ICD-10-CM

## 2023-04-03 DIAGNOSIS — I10 ESSENTIAL HYPERTENSION: ICD-10-CM

## 2023-04-03 LAB — HBA1C MFR BLD: 6.6 %

## 2023-04-03 RX ORDER — AMOXICILLIN AND CLAVULANATE POTASSIUM 875; 125 MG/1; MG/1
1 TABLET, FILM COATED ORAL 2 TIMES DAILY
Qty: 14 TABLET | Refills: 0 | Status: SHIPPED | OUTPATIENT
Start: 2023-04-03 | End: 2023-04-10

## 2023-04-03 ASSESSMENT — ENCOUNTER SYMPTOMS
COUGH: 0
RHINORRHEA: 0
NAUSEA: 0
VOMITING: 0
DIARRHEA: 0
COLOR CHANGE: 0
ABDOMINAL PAIN: 0
SORE THROAT: 0
CHEST TIGHTNESS: 0
BACK PAIN: 0
EYE ITCHING: 0
WHEEZING: 0
SINUS PRESSURE: 0
EYE REDNESS: 0
SHORTNESS OF BREATH: 0
CONSTIPATION: 0
BLOOD IN STOOL: 0

## 2023-04-03 ASSESSMENT — PATIENT HEALTH QUESTIONNAIRE - PHQ9
SUM OF ALL RESPONSES TO PHQ9 QUESTIONS 1 & 2: 0
SUM OF ALL RESPONSES TO PHQ QUESTIONS 1-9: 0
2. FEELING DOWN, DEPRESSED OR HOPELESS: 0
SUM OF ALL RESPONSES TO PHQ QUESTIONS 1-9: 0
SUM OF ALL RESPONSES TO PHQ QUESTIONS 1-9: 0
1. LITTLE INTEREST OR PLEASURE IN DOING THINGS: 0
SUM OF ALL RESPONSES TO PHQ QUESTIONS 1-9: 0

## 2023-04-03 NOTE — PROGRESS NOTES
Mame Leon (:  1965) is a 66 y.o. female,{New vs Established:[id number]::\"Established patient\"}, here for evaluation of the following chief complaint(s): Annual Exam      ASSESSMENT/PLAN:  1. Pre-diabetes  -     POCT glycosylated hemoglobin (Hb A1C)      No follow-ups on file. SUBJECTIVE/OBJECTIVE:      Current Outpatient Medications   Medication Sig Dispense Refill    losartan-hydroCHLOROthiazide (HYZAAR) 50-12.5 MG per tablet TAKE TWO TABLETS BY MOUTH EVERY  tablet 0    vitamin D (ERGOCALCIFEROL) 1.25 MG (15045 UT) CAPS capsule TAKE 1 CAPSULE BY MOUTH ONE TIME WEEKLY 4 capsule 5    colchicine (COLCRYS) 0.6 MG tablet TAKE TWO TABLETS (1.2 MG) BY MOUTH AT THE FIRST SIGN OF A FLARE FOLLOWED BY ONE ADDITIONAL TABLET ONE HOUR LATER. THEN TAKE ONE TABLET BY MOUTH DAILY AS NEEDED UNTIL RESOLVED. MAX 3 TABLETS DAILY 30 tablet 0    albuterol sulfate HFA (VENTOLIN HFA) 108 (90 Base) MCG/ACT inhaler Inhale 2 puffs into the lungs 4 times daily as needed for Wheezing 3 Inhaler 1     No current facility-administered medications for this visit. Review of Systems    Vitals:    23 1555   BP: 120/76   Site: Right Upper Arm   Position: Sitting   Cuff Size: Medium Adult   Pulse: 65   Temp: 97.1 °F (36.2 °C)   SpO2: 96%   Weight: 194 lb (88 kg)   Height: 5' 7\" (1.702 m)       Physical Exam      {Time Documentation Optional:[id number]}      An electronic signature was used to authenticate this note.     --Therese Menon, APRN - CNP
occipital adenopathy. Skin:     General: Skin is warm and dry. Neurological:      Mental Status: She is alert. Psychiatric:         Mood and Affect: Mood normal.         Behavior: Behavior normal.       Assessment:      See ProblemList assessment and plan       PHQ Scores 4/3/2023 9/30/2021 7/29/2021 7/30/2020   PHQ2 Score 0 0 0 0   PHQ9 Score 0 0 0 0     Interpretation of Total Score Depression Severity: 1-4 = Minimal depression, 5-9 = Milddepression, 10-14 = Moderate depression, 15-19 = Moderately severe depression, 20-27 = Severe depression    Plan:      Myocardiopathy ([facility] [address]  Followed by cardiology  Weight is stable  No shortness of breath    Chronic gout of left foot  Stable, controlled  No changes  Continue current treatment plan       Essential hypertension  Stable, controlled  No changes  Continue current treatment plan       Vitamin D deficiency  Lab work ordered today  Patient takes Vit D supplement    Pre-diabetes  A1C 6.6% today  Encouraged healthy diet and exercise    Tooth abscess  Has dentist appointment on 4/25  Reminded to continue to keep the mouth clean. Bilateral impacted cerumen  bilateral canal cleared of large amt cerumen after H2O2 dwell and manual extraction with instrumentation. TM intact with mild erythema and improved hearing. Canal clear with mild erythema  Patient tolerated procedure well                 Discussed over the counter medication with patient. Mecca received counseling on the following healthybehaviors: nutrition, exercise, and medication adherence    Patient given educational materials on their chronic medical conditions    Discussed use, benefit, and side effects of prescribed medications. Barriersto medication compliance addressed. All patient questions answered. Patient voiced understanding. Medications reviewed and patient understands.   Questions answered

## 2023-04-03 NOTE — ASSESSMENT & PLAN NOTE
bilateral canal cleared of large amt cerumen after H2O2 dwell and manual extraction with instrumentation. TM intact with mild erythema and improved hearing.   Canal clear with mild erythema  Patient tolerated procedure well

## 2023-05-03 PROBLEM — Z00.00 ROUTINE GENERAL MEDICAL EXAMINATION AT A HEALTH CARE FACILITY: Status: RESOLVED | Noted: 2023-04-03 | Resolved: 2023-05-03

## 2023-10-12 NOTE — PROGRESS NOTES
Cardiac Follow up     Referring Provider:  FERNANDO Horta CNP     Chief Complaint   Patient presents with    6 Month Follow-Up     Pt denies cardiac symptoms at this times. Hypertension      f/up for cardiomyopathy  History of Present Illness: This 66 y.o. female. Her history includes cardiomyopathy. Initially, she was having worsening sob and cough over a few months. PMH includes smoking but is trying to quit. She has no other heart history. She had an echo 2017 and her EF was reduced to 30-35%, and 37% by 3D, then 35-45% by repeat 2D. She has a  history of hypertension was  tried on lisinopril; it caused a cough and was stopped. Today, she is doing well overall. Patient denies chest pain, shortness of breath, palpitations, or dizziness. She is trying to stay active and take walks during her breaks at work. She uses her inhaler on walks because of SOB    Patient is compliant  with medication and is tolerating them well without side effects    Past Medical History:   has a past medical history of Allergic rhinitis, Cardiomyopathy ([address]), Depression, Gout, and Hypertension. Surgical History:   has a past surgical history that includes Hysterectomy; cyst removal; Appendectomy; Partial hysterectomy; Colonoscopy (11/05/2021); Leg Surgery (Right, 04/15/2022); US BREAST BIOPSY W LOC DEVICE 1ST LESION RIGHT (Right, 08/29/2022); and Dental examination under anesthesia. Social History:   reports that she has been smoking cigarettes. She has a 6.20 pack-year smoking history. She has never used smokeless tobacco. She reports current alcohol use of about 2.0 standard drinks of alcohol per week. She reports that she does not use drugs.      Family History:  family history includes Diabetes in her father, maternal grandfather, maternal grandmother, mother, and another family member; High Blood Pressure in an other family member; Hypertension in her maternal grandfather and maternal grandmother;

## 2023-10-13 ENCOUNTER — OFFICE VISIT (OUTPATIENT)
Dept: CARDIOLOGY CLINIC | Age: 58
End: 2023-10-13
Payer: COMMERCIAL

## 2023-10-13 VITALS
BODY MASS INDEX: 30.17 KG/M2 | OXYGEN SATURATION: 99 % | WEIGHT: 192.2 LBS | HEART RATE: 87 BPM | HEIGHT: 67 IN | DIASTOLIC BLOOD PRESSURE: 68 MMHG | SYSTOLIC BLOOD PRESSURE: 132 MMHG

## 2023-10-13 DIAGNOSIS — R06.02 SOB (SHORTNESS OF BREATH): ICD-10-CM

## 2023-10-13 DIAGNOSIS — F17.200 TOBACCO DEPENDENCE: ICD-10-CM

## 2023-10-13 DIAGNOSIS — I10 ESSENTIAL HYPERTENSION: ICD-10-CM

## 2023-10-13 DIAGNOSIS — I42.8 OTHER CARDIOMYOPATHY (HCC): Primary | ICD-10-CM

## 2023-10-13 PROCEDURE — 3074F SYST BP LT 130 MM HG: CPT | Performed by: INTERNAL MEDICINE

## 2023-10-13 PROCEDURE — 3078F DIAST BP <80 MM HG: CPT | Performed by: INTERNAL MEDICINE

## 2023-10-13 PROCEDURE — 99214 OFFICE O/P EST MOD 30 MIN: CPT | Performed by: INTERNAL MEDICINE

## 2023-10-13 RX ORDER — ALBUTEROL SULFATE 90 UG/1
2 AEROSOL, METERED RESPIRATORY (INHALATION) 4 TIMES DAILY PRN
Qty: 3 EACH | Refills: 1 | Status: CANCELLED | OUTPATIENT
Start: 2023-10-13

## 2023-10-13 RX ORDER — LOSARTAN POTASSIUM AND HYDROCHLOROTHIAZIDE 12.5; 5 MG/1; MG/1
TABLET ORAL
Qty: 180 TABLET | Refills: 3 | Status: SHIPPED | OUTPATIENT
Start: 2023-10-13

## 2023-10-13 NOTE — PATIENT INSTRUCTIONS
Continue all medications as prescribed    You can schedule your Echo at one of our outreach locations in [place], [facility], [place], or [facility] (near [facility], across the street from [facility]). They have better availably and are less expensive than the main [facility] location (same test). You can call central scheduling at [address] [place] [address] or [phone] or schedule out front before you leave today.

## 2023-11-03 ENCOUNTER — PROCEDURE VISIT (OUTPATIENT)
Dept: CARDIOLOGY CLINIC | Age: 58
End: 2023-11-03

## 2023-11-03 DIAGNOSIS — I42.8 OTHER CARDIOMYOPATHY (HCC): ICD-10-CM

## 2023-11-03 DIAGNOSIS — R06.02 SOB (SHORTNESS OF BREATH): ICD-10-CM

## 2024-03-12 ENCOUNTER — OFFICE VISIT (OUTPATIENT)
Dept: FAMILY MEDICINE CLINIC | Age: 59
End: 2024-03-12
Payer: COMMERCIAL

## 2024-03-12 VITALS
OXYGEN SATURATION: 99 % | DIASTOLIC BLOOD PRESSURE: 82 MMHG | HEART RATE: 102 BPM | WEIGHT: 195 LBS | SYSTOLIC BLOOD PRESSURE: 116 MMHG | BODY MASS INDEX: 30.54 KG/M2 | TEMPERATURE: 96.9 F

## 2024-03-12 DIAGNOSIS — I42.8 OTHER CARDIOMYOPATHY (HCC): ICD-10-CM

## 2024-03-12 DIAGNOSIS — Z00.00 ROUTINE GENERAL MEDICAL EXAMINATION AT A HEALTH CARE FACILITY: ICD-10-CM

## 2024-03-12 DIAGNOSIS — Z71.89 ACP (ADVANCE CARE PLANNING): ICD-10-CM

## 2024-03-12 DIAGNOSIS — Z00.00 ENCOUNTER FOR WELL ADULT EXAM WITHOUT ABNORMAL FINDINGS: ICD-10-CM

## 2024-03-12 DIAGNOSIS — Z00.00 ROUTINE GENERAL MEDICAL EXAMINATION AT A HEALTH CARE FACILITY: Primary | ICD-10-CM

## 2024-03-12 DIAGNOSIS — Z12.31 ENCOUNTER FOR SCREENING MAMMOGRAM FOR MALIGNANT NEOPLASM OF BREAST: ICD-10-CM

## 2024-03-12 PROCEDURE — 3079F DIAST BP 80-89 MM HG: CPT | Performed by: NURSE PRACTITIONER

## 2024-03-12 PROCEDURE — 3074F SYST BP LT 130 MM HG: CPT | Performed by: NURSE PRACTITIONER

## 2024-03-12 PROCEDURE — 99396 PREV VISIT EST AGE 40-64: CPT | Performed by: NURSE PRACTITIONER

## 2024-03-12 RX ORDER — CETIRIZINE HYDROCHLORIDE 10 MG/1
10 TABLET ORAL DAILY
Qty: 30 TABLET | Refills: 0 | Status: SHIPPED | OUTPATIENT
Start: 2024-03-12

## 2024-03-12 RX ORDER — ERGOCALCIFEROL 1.25 MG/1
CAPSULE ORAL
Qty: 4 CAPSULE | Refills: 5 | Status: SHIPPED | OUTPATIENT
Start: 2024-03-12

## 2024-03-12 RX ORDER — ALBUTEROL SULFATE 90 UG/1
2 AEROSOL, METERED RESPIRATORY (INHALATION) 4 TIMES DAILY PRN
Qty: 3 EACH | Refills: 1 | Status: SHIPPED | OUTPATIENT
Start: 2024-03-12

## 2024-03-12 SDOH — ECONOMIC STABILITY: HOUSING INSECURITY
IN THE LAST 12 MONTHS, WAS THERE A TIME WHEN YOU DID NOT HAVE A STEADY PLACE TO SLEEP OR SLEPT IN A SHELTER (INCLUDING NOW)?: NO

## 2024-03-12 SDOH — ECONOMIC STABILITY: FOOD INSECURITY: WITHIN THE PAST 12 MONTHS, YOU WORRIED THAT YOUR FOOD WOULD RUN OUT BEFORE YOU GOT MONEY TO BUY MORE.: NEVER TRUE

## 2024-03-12 SDOH — ECONOMIC STABILITY: FOOD INSECURITY: WITHIN THE PAST 12 MONTHS, THE FOOD YOU BOUGHT JUST DIDN'T LAST AND YOU DIDN'T HAVE MONEY TO GET MORE.: NEVER TRUE

## 2024-03-12 SDOH — ECONOMIC STABILITY: INCOME INSECURITY: HOW HARD IS IT FOR YOU TO PAY FOR THE VERY BASICS LIKE FOOD, HOUSING, MEDICAL CARE, AND HEATING?: NOT HARD AT ALL

## 2024-03-12 ASSESSMENT — ENCOUNTER SYMPTOMS
DIARRHEA: 0
SHORTNESS OF BREATH: 0
CHEST TIGHTNESS: 0
SORE THROAT: 0
COUGH: 0
WHEEZING: 0
CONSTIPATION: 0
BLOOD IN STOOL: 0
COLOR CHANGE: 0
ABDOMINAL PAIN: 0
VOMITING: 0
BACK PAIN: 0
NAUSEA: 0
EYE REDNESS: 0
RHINORRHEA: 0
EYE ITCHING: 0
SINUS PRESSURE: 0

## 2024-03-12 ASSESSMENT — PATIENT HEALTH QUESTIONNAIRE - PHQ9
SUM OF ALL RESPONSES TO PHQ QUESTIONS 1-9: 0
1. LITTLE INTEREST OR PLEASURE IN DOING THINGS: 0
2. FEELING DOWN, DEPRESSED OR HOPELESS: 0
SUM OF ALL RESPONSES TO PHQ QUESTIONS 1-9: 0
SUM OF ALL RESPONSES TO PHQ QUESTIONS 1-9: 0
SUM OF ALL RESPONSES TO PHQ9 QUESTIONS 1 & 2: 0
SUM OF ALL RESPONSES TO PHQ QUESTIONS 1-9: 0

## 2024-03-12 NOTE — PROGRESS NOTES
Movements: Extraocular movements intact.      Pupils: Pupils are equal, round, and reactive to light.   Cardiovascular:      Rate and Rhythm: Normal rate and regular rhythm.      Pulses: Normal pulses.      Heart sounds: Normal heart sounds.   Pulmonary:      Effort: Pulmonary effort is normal.      Breath sounds: Normal breath sounds.   Lymphadenopathy:      Head:      Right side of head: No submental, submandibular, tonsillar, preauricular, posterior auricular or occipital adenopathy.      Left side of head: No submental, submandibular, tonsillar, preauricular, posterior auricular or occipital adenopathy.   Skin:     General: Skin is warm and dry.   Neurological:      Mental Status: She is alert.   Psychiatric:         Mood and Affect: Mood normal.         Behavior: Behavior normal.                 An electronic signature was used to authenticate this note.    --FERNANDO Barbosa - CNP

## 2024-03-12 NOTE — PATIENT INSTRUCTIONS
Advance Care Planning     Advance Care Planning opens a door to talk about and write down your wishes before a sudden accident or illness.  Make your goals, values, and preferences known.     This puts you in the ’s seat and helps others know what matters most to you so they won’t have to guess.      Where can you learn more?    Go to [email or web address]   to learn how to:    Name someone you trust to make healthcare decisions for you, only if you can’t. (Healthcare Power of )    Document your wishes for care if you were seriously ill and not expected to recover or are approaching end of life. (Advance Directive or Living Will)    The same page can be found using the QR code below.                Starting a Weight Loss Plan: Care Instructions  Overview     It can be a challenge to lose weight. But your doctor can help you make a weight-loss plan that meets your needs.  You don't have to make a lot of big changes at once. A better idea might be to focus on small changes and stick with them. When those changes become habit, you can add a few more changes.  Some people find it helpful to take an exercise or nutrition class. If you have questions, ask your doctor about seeing a registered dietitian or an exercise specialist. You might also think about joining a weight-loss support group.  If you're not ready to make changes right now, try to pick a date in the future. Then make an appointment with your doctor to talk about when and how you'll get started with a plan.  Follow-up care is a key part of your treatment and safety. Be sure to make and go to all appointments, and call your doctor if you are having problems. It's also a good idea to know your test results and keep a list of the medicines you take.  How can you care for yourself at home?  Set realistic goals. Many people expect to lose much more weight than is likely. A weight loss of 5% to 10% of your body

## 2024-03-12 NOTE — ASSESSMENT & PLAN NOTE
Followed by cardiology   No shortness of breath  
Ultrasound of R breast ordered   Mammogram ordered   
Well exam in office  Labs ordered   reviewed patient refuses vaccines   Mammogram order placed   
no

## 2024-03-13 DIAGNOSIS — R73.09 ELEVATED GLUCOSE: ICD-10-CM

## 2024-03-13 DIAGNOSIS — E78.2 MODERATE MIXED HYPERLIPIDEMIA NOT REQUIRING STATIN THERAPY: Primary | ICD-10-CM

## 2024-03-13 LAB
25(OH)D3 SERPL-MCNC: 37.5 NG/ML
ALBUMIN SERPL-MCNC: 4.6 G/DL (ref 3.4–5)
ALBUMIN/GLOB SERPL: 1.4 {RATIO} (ref 1.1–2.2)
ALP SERPL-CCNC: 86 U/L (ref 40–129)
ALT SERPL-CCNC: 16 U/L (ref 10–40)
ANION GAP SERPL CALCULATED.3IONS-SCNC: 15 MMOL/L (ref 3–16)
AST SERPL-CCNC: 12 U/L (ref 15–37)
BILIRUB SERPL-MCNC: 0.4 MG/DL (ref 0–1)
BUN SERPL-MCNC: 24 MG/DL (ref 7–20)
CALCIUM SERPL-MCNC: 9.5 MG/DL (ref 8.3–10.6)
CHLORIDE SERPL-SCNC: 104 MMOL/L (ref 99–110)
CHOLEST SERPL-MCNC: 262 MG/DL (ref 0–199)
CO2 SERPL-SCNC: 21 MMOL/L (ref 21–32)
CREAT SERPL-MCNC: 1 MG/DL (ref 0.6–1.1)
DEPRECATED RDW RBC AUTO: 14.4 % (ref 12.4–15.4)
EST. AVERAGE GLUCOSE BLD GHB EST-MCNC: 131.2 MG/DL
GFR SERPLBLD CREATININE-BSD FMLA CKD-EPI: >60 ML/MIN/{1.73_M2}
GLUCOSE SERPL-MCNC: 147 MG/DL (ref 70–99)
HBA1C MFR BLD: 6.2 %
HCT VFR BLD AUTO: 36.4 % (ref 36–48)
HDLC SERPL-MCNC: 42 MG/DL (ref 40–60)
HGB BLD-MCNC: 12 G/DL (ref 12–16)
LDL CHOLESTEROL CALCULATED: ABNORMAL MG/DL
LDLC SERPL-MCNC: 142 MG/DL
MCH RBC QN AUTO: 30.2 PG (ref 26–34)
MCHC RBC AUTO-ENTMCNC: 32.9 G/DL (ref 31–36)
MCV RBC AUTO: 91.8 FL (ref 80–100)
PLATELET # BLD AUTO: 215 K/UL (ref 135–450)
PMV BLD AUTO: 11 FL (ref 5–10.5)
POTASSIUM SERPL-SCNC: 4.5 MMOL/L (ref 3.5–5.1)
PROT SERPL-MCNC: 7.8 G/DL (ref 6.4–8.2)
RBC # BLD AUTO: 3.97 M/UL (ref 4–5.2)
SODIUM SERPL-SCNC: 140 MMOL/L (ref 136–145)
TRIGL SERPL-MCNC: 413 MG/DL (ref 0–150)
TSH SERPL DL<=0.005 MIU/L-ACNC: 0.73 UIU/ML (ref 0.27–4.2)
VLDLC SERPL CALC-MCNC: ABNORMAL MG/DL
WBC # BLD AUTO: 10.8 K/UL (ref 4–11)

## 2024-03-13 RX ORDER — ATORVASTATIN CALCIUM 40 MG/1
40 TABLET, FILM COATED ORAL DAILY
Qty: 30 TABLET | Refills: 3 | Status: SHIPPED | OUTPATIENT
Start: 2024-03-13

## 2024-04-10 ENCOUNTER — HOSPITAL ENCOUNTER (OUTPATIENT)
Dept: ULTRASOUND IMAGING | Age: 59
Discharge: HOME | End: 2024-04-10
Payer: COMMERCIAL

## 2024-04-10 ENCOUNTER — HOSPITAL ENCOUNTER (OUTPATIENT)
Dept: MAMMOGRAPHY | Age: 59
Discharge: HOME | End: 2024-04-10
Payer: COMMERCIAL

## 2024-04-10 VITALS — HEIGHT: 67 IN | BODY MASS INDEX: 30.61 KG/M2 | WEIGHT: 195 LBS

## 2024-04-10 DIAGNOSIS — R92.8 ABNORMAL FINDINGS ON DIAGNOSTIC IMAGING OF BREAST: ICD-10-CM

## 2024-04-10 DIAGNOSIS — Z12.31 ENCOUNTER FOR SCREENING MAMMOGRAM FOR MALIGNANT NEOPLASM OF BREAST: ICD-10-CM

## 2024-04-10 DIAGNOSIS — R92.8 ABNORMAL MAMMOGRAM: Primary | ICD-10-CM

## 2024-04-10 PROCEDURE — 76642 ULTRASOUND BREAST LIMITED: CPT

## 2024-04-10 PROCEDURE — 77063 BREAST TOMOSYNTHESIS BI: CPT

## 2024-04-10 PROCEDURE — G0279 TOMOSYNTHESIS, MAMMO: HCPCS

## 2024-04-11 PROBLEM — Z00.00 ROUTINE GENERAL MEDICAL EXAMINATION AT A HEALTH CARE FACILITY: Status: RESOLVED | Noted: 2021-07-29 | Resolved: 2024-04-11

## 2024-04-11 PROBLEM — Z12.31 ENCOUNTER FOR SCREENING MAMMOGRAM FOR MALIGNANT NEOPLASM OF BREAST: Status: RESOLVED | Noted: 2021-09-30 | Resolved: 2024-04-11

## 2024-04-16 NOTE — PROGRESS NOTES
Cardiac Follow up     Referring Provider:  Carmel Haas, APRN - CNP     Chief Complaint   Patient presents with    Hypertension    Cardiomyopathy    6 Month Follow-Up      f/up for cardiomyopathy  History of Present Illness:  This 59 y.o. female. Her history includes cardiomyopathy. Initially, she was having worsening sob and cough over a few months. PMH includes smoking but is trying to quit. She has no other heart history. She had an echo 2017 and her EF was reduced to 30-35%, and 37% by 3D, then 35-45% by repeat 2D. She has a  history of hypertension was  tried on lisinopril; it caused a cough and was stopped.     Today, she is here for 6 mos follow up. Pt denies exertional chest pain, DELUNA/PND, palpitations, light-headedness, edema. She did not start taking the 40 mg Lipitor. She continues to smoke, but wishes to quit.       Patient is compliant  with medication and is tolerating them well without side effects    Past Medical History:   has a past medical history of Allergic rhinitis, Cardiomyopathy (HCC), Depression, Gout, and Hypertension.    Surgical History:   has a past surgical history that includes Hysterectomy; cyst removal; Appendectomy; Partial hysterectomy; Colonoscopy (11/05/2021); Leg Surgery (Right, 04/15/2022); US BREAST BIOPSY W LOC DEVICE 1ST LESION RIGHT (Right, 08/29/2022); Dental examination under anesthesia; and cyst removal (05/05/2022).     Social History:   reports that she has been smoking cigarettes. She has a 6.2 pack-year smoking history. She has never used smokeless tobacco. She reports current alcohol use of about 2.0 standard drinks of alcohol per week. She reports that she does not use drugs.     Family History:  family history includes Diabetes in her father, maternal grandfather, maternal grandmother, mother, and another family member; High Blood Pressure in an other family member; Hypertension in her maternal grandfather and maternal grandmother; Lupus in her

## 2024-04-17 ENCOUNTER — OFFICE VISIT (OUTPATIENT)
Dept: FAMILY MEDICINE CLINIC | Age: 59
End: 2024-04-17

## 2024-04-17 VITALS
SYSTOLIC BLOOD PRESSURE: 124 MMHG | TEMPERATURE: 96.9 F | WEIGHT: 194 LBS | DIASTOLIC BLOOD PRESSURE: 84 MMHG | BODY MASS INDEX: 30.38 KG/M2

## 2024-04-17 DIAGNOSIS — M10.09 ACUTE IDIOPATHIC GOUT OF MULTIPLE SITES: Primary | ICD-10-CM

## 2024-04-17 DIAGNOSIS — I42.8 OTHER CARDIOMYOPATHY (HCC): ICD-10-CM

## 2024-04-17 PROBLEM — J40 BRONCHITIS: Status: RESOLVED | Noted: 2021-08-02 | Resolved: 2024-04-17

## 2024-04-17 PROBLEM — Z78.9 ALCOHOL INGESTION: Status: RESOLVED | Noted: 2017-06-01 | Resolved: 2024-04-17

## 2024-04-17 PROBLEM — L02.31 CELLULITIS AND ABSCESS OF BUTTOCK: Status: RESOLVED | Noted: 2022-04-17 | Resolved: 2024-04-17

## 2024-04-17 PROBLEM — R06.09 DOE (DYSPNEA ON EXERTION): Status: RESOLVED | Noted: 2017-06-01 | Resolved: 2024-04-17

## 2024-04-17 PROBLEM — H61.23 BILATERAL IMPACTED CERUMEN: Status: RESOLVED | Noted: 2023-04-03 | Resolved: 2024-04-17

## 2024-04-17 PROBLEM — K04.7 TOOTH ABSCESS: Status: RESOLVED | Noted: 2023-04-03 | Resolved: 2024-04-17

## 2024-04-17 PROBLEM — L02.214 ABSCESS OF RIGHT GROIN: Status: RESOLVED | Noted: 2022-04-15 | Resolved: 2024-04-17

## 2024-04-17 PROBLEM — L02.415 ABSCESS OF RIGHT THIGH: Status: RESOLVED | Noted: 2022-04-15 | Resolved: 2024-04-17

## 2024-04-17 PROBLEM — M1A.0720 CHRONIC GOUT OF LEFT FOOT: Status: RESOLVED | Noted: 2018-08-10 | Resolved: 2024-04-17

## 2024-04-17 PROBLEM — L03.317 CELLULITIS AND ABSCESS OF BUTTOCK: Status: RESOLVED | Noted: 2022-04-17 | Resolved: 2024-04-17

## 2024-04-17 PROBLEM — L03.90 CELLULITIS: Status: RESOLVED | Noted: 2022-04-17 | Resolved: 2024-04-17

## 2024-04-17 PROBLEM — L02.91 ABSCESS: Status: RESOLVED | Noted: 2022-04-15 | Resolved: 2024-04-17

## 2024-04-17 RX ORDER — COLCHICINE 0.6 MG/1
0.6 TABLET ORAL DAILY
Qty: 30 TABLET | Refills: 3 | Status: SHIPPED | OUTPATIENT
Start: 2024-04-17

## 2024-04-17 RX ORDER — METHYLPREDNISOLONE ACETATE 80 MG/ML
80 INJECTION, SUSPENSION INTRA-ARTICULAR; INTRALESIONAL; INTRAMUSCULAR; SOFT TISSUE ONCE
Status: COMPLETED | OUTPATIENT
Start: 2024-04-17 | End: 2024-04-17

## 2024-04-17 RX ORDER — PREDNISONE 20 MG/1
20 TABLET ORAL DAILY
Qty: 10 TABLET | Refills: 0 | Status: SHIPPED | OUTPATIENT
Start: 2024-04-17 | End: 2024-04-27

## 2024-04-17 RX ADMIN — METHYLPREDNISOLONE ACETATE 80 MG: 80 INJECTION, SUSPENSION INTRA-ARTICULAR; INTRALESIONAL; INTRAMUSCULAR; SOFT TISSUE at 10:03

## 2024-04-17 ASSESSMENT — ENCOUNTER SYMPTOMS
COLOR CHANGE: 0
EYE ITCHING: 0
BLOOD IN STOOL: 0
NAUSEA: 0
CONSTIPATION: 0
RHINORRHEA: 0
VOMITING: 0
SHORTNESS OF BREATH: 0
SINUS PRESSURE: 0
COUGH: 0
BACK PAIN: 0
WHEEZING: 0
EYE REDNESS: 0
DIARRHEA: 0
SORE THROAT: 0
ABDOMINAL PAIN: 0
CHEST TIGHTNESS: 0

## 2024-04-17 NOTE — PROGRESS NOTES
Mecca Paniagua (:  1965) is a 59 y.o. female,Established patient, here for evaluation of the following chief complaint(s):  Gout      ASSESSMENT/PLAN:  1. Acute idiopathic gout of multiple sites  Assessment & Plan:   He was evidence of significant gout flare at this time.  Will proceed with an 80 mg IM injection of Depo-Medrol to help with pain management along with 20 mg of prednisone daily for the next 10 days we will also reorder her colchicine.  Patient's flares are very sporadic and not consistent so at this time there is not consistent evidence to provide daily allopurinol.  Patient agreeable.  Orders:  -     methylPREDNISolone acetate (DEPO-MEDROL) injection 80 mg; 80 mg, IntraMUSCular, ONCE, 1 dose, On 24 at 1015  2. Other cardiomyopathy (HCC)  Assessment & Plan:   Monitored by specialist- no acute findings meriting change in the plan      No follow-ups on file.    SUBJECTIVE/OBJECTIVE:  Patient presents today with concerns for gout flare.  Is been about 2 years since her last flare.  She is to choose been dealing with this 1 for the past month.  No pain is well localized in her right great toe and left knee.  She has been using Motrin and leftover colchicine without improvement in symptoms are progressive.  Is very painful her to walk.    Current Outpatient Medications   Medication Sig Dispense Refill    predniSONE (DELTASONE) 20 MG tablet Take 1 tablet by mouth daily for 10 days 10 tablet 0    colchicine (COLCRYS) 0.6 MG tablet Take 1 tablet by mouth daily 1.2 mg at the first sign of flare, followed in 1 hour with a single dose of 0.6 mg max of 1.8 mg daily 30 tablet 3    atorvastatin (LIPITOR) 40 MG tablet Take 1 tablet by mouth daily 30 tablet 3    vitamin D (ERGOCALCIFEROL) 1.25 MG (96861 UT) CAPS capsule TAKE 1 CAPSULE BY MOUTH ONE TIME WEEKLY 4 capsule 5    albuterol sulfate HFA (VENTOLIN HFA) 108 (90 Base) MCG/ACT inhaler Inhale 2 puffs into the lungs 4 times daily as needed for

## 2024-04-17 NOTE — ASSESSMENT & PLAN NOTE
He was evidence of significant gout flare at this time.  Will proceed with an 80 mg IM injection of Depo-Medrol to help with pain management along with 20 mg of prednisone daily for the next 10 days we will also reorder her colchicine.  Patient's flares are very sporadic and not consistent so at this time there is not consistent evidence to provide daily allopurinol.  Patient agreeable.

## 2024-05-06 ENCOUNTER — OFFICE VISIT (OUTPATIENT)
Dept: CARDIOLOGY CLINIC | Age: 59
End: 2024-05-06
Payer: COMMERCIAL

## 2024-05-06 VITALS
HEART RATE: 91 BPM | WEIGHT: 192 LBS | OXYGEN SATURATION: 99 % | SYSTOLIC BLOOD PRESSURE: 130 MMHG | HEIGHT: 67 IN | DIASTOLIC BLOOD PRESSURE: 72 MMHG | BODY MASS INDEX: 30.13 KG/M2

## 2024-05-06 DIAGNOSIS — I10 ESSENTIAL HYPERTENSION: ICD-10-CM

## 2024-05-06 DIAGNOSIS — F17.200 TOBACCO DEPENDENCE: Primary | ICD-10-CM

## 2024-05-06 DIAGNOSIS — I42.8 OTHER CARDIOMYOPATHY (HCC): ICD-10-CM

## 2024-05-06 DIAGNOSIS — R06.02 SOB (SHORTNESS OF BREATH): ICD-10-CM

## 2024-05-06 PROCEDURE — 99214 OFFICE O/P EST MOD 30 MIN: CPT | Performed by: INTERNAL MEDICINE

## 2024-05-06 PROCEDURE — 93000 ELECTROCARDIOGRAM COMPLETE: CPT | Performed by: INTERNAL MEDICINE

## 2024-05-06 PROCEDURE — 3078F DIAST BP <80 MM HG: CPT | Performed by: INTERNAL MEDICINE

## 2024-05-06 PROCEDURE — 3075F SYST BP GE 130 - 139MM HG: CPT | Performed by: INTERNAL MEDICINE

## 2024-05-06 NOTE — PATIENT INSTRUCTIONS
CT coronary calcium score  Labs in mid June 2024  Could consider nicotine replacement  Continue risk factor modifications.   Call for any change in symptoms, call to report any changes in shortness of breath or development of chest pain with activity.    Follow up in 6 mos

## 2024-05-20 ENCOUNTER — HOSPITAL ENCOUNTER (OUTPATIENT)
Dept: CT IMAGING | Age: 59
Discharge: HOME | End: 2024-05-20
Attending: INTERNAL MEDICINE
Payer: COMMERCIAL

## 2024-05-20 DIAGNOSIS — I10 ESSENTIAL HYPERTENSION: ICD-10-CM

## 2024-05-20 DIAGNOSIS — I42.8 OTHER CARDIOMYOPATHY (HCC): ICD-10-CM

## 2024-05-20 DIAGNOSIS — F17.200 TOBACCO DEPENDENCE: ICD-10-CM

## 2024-05-20 DIAGNOSIS — R06.02 SOB (SHORTNESS OF BREATH): ICD-10-CM

## 2024-05-20 PROCEDURE — 75571 CT HRT W/O DYE W/CA TEST: CPT

## 2024-07-01 ENCOUNTER — HOSPITAL ENCOUNTER (OUTPATIENT)
Age: 59
Discharge: HOME | End: 2024-07-01
Payer: COMMERCIAL

## 2024-07-01 DIAGNOSIS — F17.200 TOBACCO DEPENDENCE: ICD-10-CM

## 2024-07-01 DIAGNOSIS — I10 ESSENTIAL HYPERTENSION: ICD-10-CM

## 2024-07-01 DIAGNOSIS — R06.02 SOB (SHORTNESS OF BREATH): ICD-10-CM

## 2024-07-01 DIAGNOSIS — I42.8 OTHER CARDIOMYOPATHY (HCC): ICD-10-CM

## 2024-07-01 LAB
ALBUMIN SERPL-MCNC: 4.4 G/DL (ref 3.4–5)
ALP SERPL-CCNC: 127 U/L (ref 40–129)
ALT SERPL-CCNC: 31 U/L (ref 10–40)
AST SERPL-CCNC: 22 U/L (ref 15–37)
BILIRUB DIRECT SERPL-MCNC: <0.2 MG/DL (ref 0–0.3)
BILIRUB INDIRECT SERPL-MCNC: NORMAL MG/DL (ref 0–1)
BILIRUB SERPL-MCNC: 0.5 MG/DL (ref 0–1)
PROT SERPL-MCNC: 7.8 G/DL (ref 6.4–8.2)

## 2024-07-01 PROCEDURE — 80076 HEPATIC FUNCTION PANEL: CPT

## 2024-07-01 PROCEDURE — 36415 COLL VENOUS BLD VENIPUNCTURE: CPT

## 2024-07-05 ENCOUNTER — TELEPHONE (OUTPATIENT)
Dept: CARDIOLOGY CLINIC | Age: 59
End: 2024-07-05

## 2024-07-05 DIAGNOSIS — I42.8 OTHER CARDIOMYOPATHY (HCC): ICD-10-CM

## 2024-07-05 DIAGNOSIS — I10 ESSENTIAL HYPERTENSION: ICD-10-CM

## 2024-07-05 DIAGNOSIS — I10 ESSENTIAL HYPERTENSION: Primary | ICD-10-CM

## 2024-07-05 LAB
CHOLEST SERPL-MCNC: 159 MG/DL (ref 0–199)
HDLC SERPL-MCNC: 49 MG/DL (ref 40–60)
LDLC SERPL CALC-MCNC: 64 MG/DL
TRIGL SERPL-MCNC: 230 MG/DL (ref 0–150)
VLDLC SERPL CALC-MCNC: 46 MG/DL

## 2024-07-05 NOTE — TELEPHONE ENCOUNTER
----- Message from Jone Musa MD sent at 7/5/2024 12:53 PM EDT -----    Needed lipid profile . Ordered but not run. See if lab can add

## 2024-09-12 ENCOUNTER — OFFICE VISIT (OUTPATIENT)
Dept: FAMILY MEDICINE CLINIC | Age: 59
End: 2024-09-12

## 2024-09-12 VITALS
BODY MASS INDEX: 30.7 KG/M2 | RESPIRATION RATE: 14 BRPM | SYSTOLIC BLOOD PRESSURE: 132 MMHG | HEART RATE: 101 BPM | WEIGHT: 196 LBS | TEMPERATURE: 97.4 F | OXYGEN SATURATION: 100 % | DIASTOLIC BLOOD PRESSURE: 66 MMHG

## 2024-09-12 DIAGNOSIS — M10.072 ACUTE IDIOPATHIC GOUT INVOLVING TOE OF LEFT FOOT: ICD-10-CM

## 2024-09-12 DIAGNOSIS — M10.072 ACUTE IDIOPATHIC GOUT INVOLVING TOE OF LEFT FOOT: Primary | ICD-10-CM

## 2024-09-12 RX ORDER — PREDNISONE 20 MG/1
20 TABLET ORAL DAILY
Qty: 10 TABLET | Refills: 0 | Status: SHIPPED | OUTPATIENT
Start: 2024-09-12 | End: 2024-09-22

## 2024-09-12 ASSESSMENT — ENCOUNTER SYMPTOMS
WHEEZING: 0
COLOR CHANGE: 0
CONSTIPATION: 0
COUGH: 0
CHEST TIGHTNESS: 0
ABDOMINAL PAIN: 0
VOMITING: 0
EYE REDNESS: 0
BLOOD IN STOOL: 0
EYE ITCHING: 0
DIARRHEA: 0
SINUS PRESSURE: 0
SORE THROAT: 0
BACK PAIN: 0
SHORTNESS OF BREATH: 0
NAUSEA: 0
RHINORRHEA: 0

## 2024-09-13 LAB — URATE SERPL-MCNC: 9.9 MG/DL (ref 2.6–6)

## 2024-09-23 ENCOUNTER — TELEPHONE (OUTPATIENT)
Dept: FAMILY MEDICINE CLINIC | Age: 59
End: 2024-09-23

## 2024-09-24 RX ORDER — ALLOPURINOL 300 MG/1
300 TABLET ORAL DAILY
Qty: 90 TABLET | Refills: 0 | Status: SHIPPED | OUTPATIENT
Start: 2024-09-24

## 2024-10-07 ENCOUNTER — TELEPHONE (OUTPATIENT)
Dept: FAMILY MEDICINE CLINIC | Age: 59
End: 2024-10-07

## 2024-10-07 RX ORDER — PREDNISONE 10 MG/1
10 TABLET ORAL 2 TIMES DAILY
Qty: 10 TABLET | Refills: 0 | Status: SHIPPED | OUTPATIENT
Start: 2024-10-07 | End: 2024-10-12

## 2024-10-07 NOTE — TELEPHONE ENCOUNTER
Prednisone sent.  Given recurrence of flares of gout patient should be seen by rheumatology.  I recommend Dr. Dos Santos

## 2024-10-07 NOTE — TELEPHONE ENCOUNTER
Patient requesting predniSONE (DELTASONE) 20 MG tablet for gout flair up. Please call into [facility] on file. Thanks

## 2024-10-13 DIAGNOSIS — I42.8 OTHER CARDIOMYOPATHY (HCC): Primary | ICD-10-CM

## 2024-10-14 RX ORDER — ATORVASTATIN CALCIUM 40 MG/1
40 TABLET, FILM COATED ORAL DAILY
Qty: 30 TABLET | Refills: 3 | Status: SHIPPED | OUTPATIENT
Start: 2024-10-14

## 2024-11-05 PROBLEM — E78.5 HYPERLIPIDEMIA: Status: ACTIVE | Noted: 2024-11-05

## 2024-11-05 NOTE — PROGRESS NOTES
Cardiac Follow up     Referring Provider:  Carmel Haas, APRN - CNP     Chief Complaint   Patient presents with    6 Month Follow-Up     No c/c today      f/up for cardiomyopathy  History of Present Illness:  This 59 y.o. female. Her history includes cardiomyopathy. Initially, she was having worsening sob and cough over a few months. PMH includes smoking but is trying to quit. She has no other heart history. She had an echo 2017 and her EF was reduced to 30-35%, and 37% by 3D, then 35-45% by repeat 2D. She has a  history of hypertension was  tried on lisinopril; it caused a cough and was stopped.     Today, she is here for 6 mos follow up. She has been feeling good.  Pt denies exertional chest pain, DELUNA/PND, palpitations, light-headedness, edema.  She would like help with smoking cessation. She has a strong desire to quit, she is considering stopping \"cold turkey.\"  She has cut back on smoking.  She smokes ~3 cigarettes per day.  She is not interested in Chantix. She continues taking Lipitor daily.      Patient is compliant  with medication and is tolerating them well without side effects    Past Medical History:   has a past medical history of Allergic rhinitis, Cardiomyopathy (HCC), Depression, Gout, and Hypertension.    Surgical History:   has a past surgical history that includes Hysterectomy; cyst removal; Appendectomy; Partial hysterectomy; Colonoscopy (11/05/2021); Leg Surgery (Right, 04/15/2022); US BREAST BIOPSY W LOC DEVICE 1ST LESION RIGHT (Right, 08/29/2022); Dental examination under anesthesia; and cyst removal (05/05/2022).     Social History:   reports that she has been smoking cigarettes. She has a 6.2 pack-year smoking history. She has never used smokeless tobacco. She reports current alcohol use of about 2.0 standard drinks of alcohol per week. She reports that she does not use drugs.     Family History:  family history includes Diabetes in her father, maternal grandfather, maternal

## 2024-11-21 ENCOUNTER — OFFICE VISIT (OUTPATIENT)
Dept: CARDIOLOGY CLINIC | Age: 59
End: 2024-11-21
Payer: COMMERCIAL

## 2024-11-21 VITALS
BODY MASS INDEX: 30.89 KG/M2 | WEIGHT: 196.8 LBS | OXYGEN SATURATION: 96 % | HEIGHT: 67 IN | DIASTOLIC BLOOD PRESSURE: 70 MMHG | SYSTOLIC BLOOD PRESSURE: 114 MMHG | HEART RATE: 96 BPM

## 2024-11-21 DIAGNOSIS — F17.200 TOBACCO DEPENDENCE: ICD-10-CM

## 2024-11-21 DIAGNOSIS — I42.8 OTHER CARDIOMYOPATHY (HCC): Primary | ICD-10-CM

## 2024-11-21 DIAGNOSIS — E78.5 HYPERLIPIDEMIA, UNSPECIFIED HYPERLIPIDEMIA TYPE: ICD-10-CM

## 2024-11-21 DIAGNOSIS — I10 ESSENTIAL HYPERTENSION: ICD-10-CM

## 2024-11-21 DIAGNOSIS — R06.02 SOB (SHORTNESS OF BREATH): ICD-10-CM

## 2024-11-21 PROCEDURE — 3078F DIAST BP <80 MM HG: CPT | Performed by: INTERNAL MEDICINE

## 2024-11-21 PROCEDURE — 3074F SYST BP LT 130 MM HG: CPT | Performed by: INTERNAL MEDICINE

## 2024-11-21 PROCEDURE — 99214 OFFICE O/P EST MOD 30 MIN: CPT | Performed by: INTERNAL MEDICINE

## 2024-11-21 RX ORDER — LOSARTAN POTASSIUM AND HYDROCHLOROTHIAZIDE 12.5; 5 MG/1; MG/1
TABLET ORAL
Qty: 180 TABLET | Refills: 3 | Status: SHIPPED | OUTPATIENT
Start: 2024-11-21

## 2024-11-21 NOTE — PATIENT INSTRUCTIONS
No medication changes  Could consider synthetic nicotine pouch (Zyn) or Electronic cigarette or Chantix  Smoking cessation encouraged  Continue risk factor modifications.   Call for any change in symptoms, call to report any changes in shortness of breath or development of chest pain with activity.    Follow up in 12 mos

## 2025-02-18 ENCOUNTER — OFFICE VISIT (OUTPATIENT)
Dept: FAMILY MEDICINE CLINIC | Age: 60
End: 2025-02-18

## 2025-02-18 VITALS
OXYGEN SATURATION: 96 % | BODY MASS INDEX: 30.89 KG/M2 | SYSTOLIC BLOOD PRESSURE: 138 MMHG | HEART RATE: 92 BPM | DIASTOLIC BLOOD PRESSURE: 84 MMHG | HEIGHT: 67 IN | WEIGHT: 196.8 LBS | RESPIRATION RATE: 16 BRPM | TEMPERATURE: 97.1 F

## 2025-02-18 DIAGNOSIS — Z00.00 ENCOUNTER FOR WELL ADULT EXAM WITHOUT ABNORMAL FINDINGS: ICD-10-CM

## 2025-02-18 DIAGNOSIS — M10.09 ACUTE IDIOPATHIC GOUT OF MULTIPLE SITES: ICD-10-CM

## 2025-02-18 DIAGNOSIS — Z12.31 ENCOUNTER FOR SCREENING MAMMOGRAM FOR MALIGNANT NEOPLASM OF BREAST: ICD-10-CM

## 2025-02-18 DIAGNOSIS — R73.03 PRE-DIABETES: ICD-10-CM

## 2025-02-18 DIAGNOSIS — Z00.00 ROUTINE GENERAL MEDICAL EXAMINATION AT A HEALTH CARE FACILITY: Primary | ICD-10-CM

## 2025-02-18 DIAGNOSIS — E55.9 VITAMIN D DEFICIENCY: ICD-10-CM

## 2025-02-18 DIAGNOSIS — I42.8 OTHER CARDIOMYOPATHY (HCC): ICD-10-CM

## 2025-02-18 DIAGNOSIS — E78.5 HYPERLIPIDEMIA, UNSPECIFIED HYPERLIPIDEMIA TYPE: ICD-10-CM

## 2025-02-18 RX ORDER — METHYLPREDNISOLONE ACETATE 80 MG/ML
80 INJECTION, SUSPENSION INTRA-ARTICULAR; INTRALESIONAL; INTRAMUSCULAR; SOFT TISSUE ONCE
Status: COMPLETED | OUTPATIENT
Start: 2025-02-18 | End: 2025-02-18

## 2025-02-18 RX ADMIN — METHYLPREDNISOLONE ACETATE 80 MG: 80 INJECTION, SUSPENSION INTRA-ARTICULAR; INTRALESIONAL; INTRAMUSCULAR; SOFT TISSUE at 16:12

## 2025-02-18 SDOH — ECONOMIC STABILITY: FOOD INSECURITY: WITHIN THE PAST 12 MONTHS, YOU WORRIED THAT YOUR FOOD WOULD RUN OUT BEFORE YOU GOT MONEY TO BUY MORE.: NEVER TRUE

## 2025-02-18 SDOH — ECONOMIC STABILITY: FOOD INSECURITY: WITHIN THE PAST 12 MONTHS, THE FOOD YOU BOUGHT JUST DIDN'T LAST AND YOU DIDN'T HAVE MONEY TO GET MORE.: NEVER TRUE

## 2025-02-18 ASSESSMENT — PATIENT HEALTH QUESTIONNAIRE - PHQ9
SUM OF ALL RESPONSES TO PHQ QUESTIONS 1-9: 0
1. LITTLE INTEREST OR PLEASURE IN DOING THINGS: NOT AT ALL
SUM OF ALL RESPONSES TO PHQ QUESTIONS 1-9: 0
2. FEELING DOWN, DEPRESSED OR HOPELESS: NOT AT ALL
SUM OF ALL RESPONSES TO PHQ QUESTIONS 1-9: 0
SUM OF ALL RESPONSES TO PHQ9 QUESTIONS 1 & 2: 0
SUM OF ALL RESPONSES TO PHQ QUESTIONS 1-9: 0

## 2025-02-19 ASSESSMENT — ENCOUNTER SYMPTOMS
NAUSEA: 0
RHINORRHEA: 0
ABDOMINAL PAIN: 0
DIARRHEA: 0
WHEEZING: 0
SORE THROAT: 0
BLOOD IN STOOL: 0
BACK PAIN: 0
COUGH: 0
EYE ITCHING: 0
SINUS PRESSURE: 0
EYE REDNESS: 0
CONSTIPATION: 0
VOMITING: 0
SHORTNESS OF BREATH: 0
COLOR CHANGE: 0
CHEST TIGHTNESS: 0

## 2025-02-19 NOTE — ASSESSMENT & PLAN NOTE
Her last mammogram was conducted on 04/10/2024, indicating that she is not due for another until a few more months. A referral for a screening mammogram has been placed. Routine blood work, including vitamin D level, thyroid function, kidney function, and CBC, will be ordered. She has declined the influenza vaccine.

## 2025-02-19 NOTE — ASSESSMENT & PLAN NOTE
She reports a gout flare-up in her foot for about a week. She has a history of frequent flare-ups every 4 to 5 months, primarily affecting her feet but also her knees and hands. She has a few tablets of colchicine left but no prednisone. A steroid injection of 80 mg will be administered today. She is advised to continue taking allopurinol. Blood work, including uric acid levels, will be ordered to monitor her condition.

## 2025-02-19 NOTE — PROGRESS NOTES
Mecca Paniagua (:  1965) is a 60 y.o. female,Established patient, here for evaluation of the following chief complaint(s):  Annual Exam and Gout      ASSESSMENT/PLAN:  1. Routine general medical examination at a health care facility  -     CBC; Future  -     Comprehensive Metabolic Panel; Future  -     Lipid, Fasting; Future  -     TSH reflex to FT4; Future  -     Vitamin D 25 Hydroxy; Future  -     TADEO DIGITAL SCREEN W OR WO CAD BILATERAL; Future  -     Uric Acid; Future  -     Hemoglobin A1C; Future  -     methylPREDNISolone acetate (DEPO-MEDROL) injection 80 mg; 80 mg, IntraMUSCular, ONCE, 1 dose, On 25 at 1630  2. Encounter for screening mammogram for malignant neoplasm of breast  -     TADEO DIGITAL SCREEN W OR WO CAD BILATERAL; Future  3. Acute idiopathic gout of multiple sites  Assessment & Plan:   She reports a gout flare-up in her foot for about a week. She has a history of frequent flare-ups every 4 to 5 months, primarily affecting her feet but also her knees and hands. She has a few tablets of colchicine left but no prednisone. A steroid injection of 80 mg will be administered today. She is advised to continue taking allopurinol. Blood work, including uric acid levels, will be ordered to monitor her condition.  Orders:  -     methylPREDNISolone acetate (DEPO-MEDROL) injection 80 mg; 80 mg, IntraMUSCular, ONCE, 1 dose, On 25 at 1630  4. Vitamin D deficiency  -     Vitamin D 25 Hydroxy; Future  5. Pre-diabetes  -     Hemoglobin A1C; Future  6. Other cardiomyopathy (HCC)  7. Encounter for well adult exam without abnormal findings  Assessment & Plan:   Her last mammogram was conducted on 04/10/2024, indicating that she is not due for another until a few more months. A referral for a screening mammogram has been placed. Routine blood work, including vitamin D level, thyroid function, kidney function, and CBC, will be ordered. She has declined the influenza vaccine.  8. Hyperlipidemia,

## 2025-02-19 NOTE — ASSESSMENT & PLAN NOTE
She is currently taking atorvastatin for her cholesterol but reports side effects, including bone aches. Blood work will be ordered to assess her cholesterol levels. She is advised to continue her current medication until further evaluation by her cardiologist.

## 2025-02-20 DIAGNOSIS — R73.03 PRE-DIABETES: ICD-10-CM

## 2025-02-20 DIAGNOSIS — E55.9 VITAMIN D DEFICIENCY: ICD-10-CM

## 2025-02-20 DIAGNOSIS — Z00.00 ROUTINE GENERAL MEDICAL EXAMINATION AT A HEALTH CARE FACILITY: ICD-10-CM

## 2025-02-20 LAB
25(OH)D3 SERPL-MCNC: 33.5 NG/ML
ALBUMIN SERPL-MCNC: 4.4 G/DL (ref 3.4–5)
ALBUMIN/GLOB SERPL: 1.4 {RATIO} (ref 1.1–2.2)
ALP SERPL-CCNC: 80 U/L (ref 40–129)
ALT SERPL-CCNC: 15 U/L (ref 10–40)
ANION GAP SERPL CALCULATED.3IONS-SCNC: 13 MMOL/L (ref 3–16)
AST SERPL-CCNC: 13 U/L (ref 15–37)
BILIRUB SERPL-MCNC: 0.3 MG/DL (ref 0–1)
BUN SERPL-MCNC: 30 MG/DL (ref 7–20)
CALCIUM SERPL-MCNC: 10.1 MG/DL (ref 8.3–10.6)
CHLORIDE SERPL-SCNC: 102 MMOL/L (ref 99–110)
CHOLEST SERPL-MCNC: 271 MG/DL (ref 0–199)
CO2 SERPL-SCNC: 25 MMOL/L (ref 21–32)
CREAT SERPL-MCNC: 1.2 MG/DL (ref 0.6–1.1)
DEPRECATED RDW RBC AUTO: 14.9 % (ref 12.4–15.4)
GFR SERPLBLD CREATININE-BSD FMLA CKD-EPI: 52 ML/MIN/{1.73_M2}
GLUCOSE SERPL-MCNC: 127 MG/DL (ref 70–99)
HCT VFR BLD AUTO: 39.4 % (ref 36–48)
HDLC SERPL-MCNC: 47 MG/DL (ref 40–60)
HGB BLD-MCNC: 12.8 G/DL (ref 12–16)
LDL CHOLESTEROL: 180 MG/DL
MCH RBC QN AUTO: 30.2 PG (ref 26–34)
MCHC RBC AUTO-ENTMCNC: 32.5 G/DL (ref 31–36)
MCV RBC AUTO: 93.1 FL (ref 80–100)
PLATELET # BLD AUTO: 223 K/UL (ref 135–450)
PMV BLD AUTO: 12 FL (ref 5–10.5)
POTASSIUM SERPL-SCNC: 5.2 MMOL/L (ref 3.5–5.1)
PROT SERPL-MCNC: 7.5 G/DL (ref 6.4–8.2)
RBC # BLD AUTO: 4.23 M/UL (ref 4–5.2)
SODIUM SERPL-SCNC: 140 MMOL/L (ref 136–145)
TRIGL SERPL-MCNC: 220 MG/DL (ref 0–150)
TSH SERPL DL<=0.005 MIU/L-ACNC: 1.48 UIU/ML (ref 0.27–4.2)
URATE SERPL-MCNC: 5.5 MG/DL (ref 2.6–6)
VLDLC SERPL CALC-MCNC: 44 MG/DL
WBC # BLD AUTO: 8.9 K/UL (ref 4–11)

## 2025-02-21 LAB
EST. AVERAGE GLUCOSE BLD GHB EST-MCNC: 151.3 MG/DL
HBA1C MFR BLD: 6.9 %

## 2025-02-25 ENCOUNTER — OFFICE VISIT (OUTPATIENT)
Dept: FAMILY MEDICINE CLINIC | Age: 60
End: 2025-02-25
Payer: COMMERCIAL

## 2025-02-25 VITALS
WEIGHT: 197 LBS | HEART RATE: 93 BPM | BODY MASS INDEX: 30.85 KG/M2 | OXYGEN SATURATION: 98 % | DIASTOLIC BLOOD PRESSURE: 60 MMHG | SYSTOLIC BLOOD PRESSURE: 110 MMHG

## 2025-02-25 DIAGNOSIS — E11.8 DIABETES MELLITUS TYPE 2 WITH COMPLICATIONS (HCC): Primary | ICD-10-CM

## 2025-02-25 DIAGNOSIS — E78.2 MIXED HYPERLIPIDEMIA: ICD-10-CM

## 2025-02-25 PROCEDURE — 3044F HG A1C LEVEL LT 7.0%: CPT | Performed by: NURSE PRACTITIONER

## 2025-02-25 PROCEDURE — 99214 OFFICE O/P EST MOD 30 MIN: CPT | Performed by: NURSE PRACTITIONER

## 2025-02-25 PROCEDURE — G2211 COMPLEX E/M VISIT ADD ON: HCPCS | Performed by: NURSE PRACTITIONER

## 2025-02-25 PROCEDURE — 3074F SYST BP LT 130 MM HG: CPT | Performed by: NURSE PRACTITIONER

## 2025-02-25 PROCEDURE — 3078F DIAST BP <80 MM HG: CPT | Performed by: NURSE PRACTITIONER

## 2025-02-25 RX ORDER — BLOOD-GLUCOSE METER
1 KIT MISCELLANEOUS DAILY
Qty: 100 EACH | Refills: 3 | Status: SHIPPED | OUTPATIENT
Start: 2025-02-25

## 2025-02-25 RX ORDER — LANCETS 28 GAUGE
1 EACH MISCELLANEOUS DAILY
Qty: 100 EACH | Refills: 3 | Status: SHIPPED | OUTPATIENT
Start: 2025-02-25

## 2025-02-25 RX ORDER — BLOOD-GLUCOSE METER
1 KIT MISCELLANEOUS DAILY
Qty: 1 KIT | Refills: 0 | Status: SHIPPED | OUTPATIENT
Start: 2025-02-25

## 2025-02-25 RX ORDER — ATORVASTATIN CALCIUM 80 MG/1
80 TABLET, FILM COATED ORAL DAILY
Qty: 90 TABLET | Refills: 0 | Status: SHIPPED | OUTPATIENT
Start: 2025-02-25

## 2025-02-25 RX ORDER — DAPAGLIFLOZIN 10 MG/1
10 TABLET, FILM COATED ORAL EVERY MORNING
Qty: 90 TABLET | Refills: 0 | Status: SHIPPED | OUTPATIENT
Start: 2025-02-25 | End: 2025-02-25

## 2025-02-25 SDOH — ECONOMIC STABILITY: FOOD INSECURITY: WITHIN THE PAST 12 MONTHS, YOU WORRIED THAT YOUR FOOD WOULD RUN OUT BEFORE YOU GOT MONEY TO BUY MORE.: NEVER TRUE

## 2025-02-25 SDOH — ECONOMIC STABILITY: FOOD INSECURITY: WITHIN THE PAST 12 MONTHS, THE FOOD YOU BOUGHT JUST DIDN'T LAST AND YOU DIDN'T HAVE MONEY TO GET MORE.: NEVER TRUE

## 2025-02-25 ASSESSMENT — PATIENT HEALTH QUESTIONNAIRE - PHQ9
SUM OF ALL RESPONSES TO PHQ QUESTIONS 1-9: 0
8. MOVING OR SPEAKING SO SLOWLY THAT OTHER PEOPLE COULD HAVE NOTICED. OR THE OPPOSITE, BEING SO FIGETY OR RESTLESS THAT YOU HAVE BEEN MOVING AROUND A LOT MORE THAN USUAL: NOT AT ALL
SUM OF ALL RESPONSES TO PHQ QUESTIONS 1-9: 0
4. FEELING TIRED OR HAVING LITTLE ENERGY: NOT AT ALL
10. IF YOU CHECKED OFF ANY PROBLEMS, HOW DIFFICULT HAVE THESE PROBLEMS MADE IT FOR YOU TO DO YOUR WORK, TAKE CARE OF THINGS AT HOME, OR GET ALONG WITH OTHER PEOPLE: NOT DIFFICULT AT ALL
9. THOUGHTS THAT YOU WOULD BE BETTER OFF DEAD, OR OF HURTING YOURSELF: NOT AT ALL
5. POOR APPETITE OR OVEREATING: NOT AT ALL
SUM OF ALL RESPONSES TO PHQ9 QUESTIONS 1 & 2: 0
2. FEELING DOWN, DEPRESSED OR HOPELESS: NOT AT ALL
3. TROUBLE FALLING OR STAYING ASLEEP: NOT AT ALL
6. FEELING BAD ABOUT YOURSELF - OR THAT YOU ARE A FAILURE OR HAVE LET YOURSELF OR YOUR FAMILY DOWN: NOT AT ALL
7. TROUBLE CONCENTRATING ON THINGS, SUCH AS READING THE NEWSPAPER OR WATCHING TELEVISION: NOT AT ALL
1. LITTLE INTEREST OR PLEASURE IN DOING THINGS: NOT AT ALL

## 2025-02-25 ASSESSMENT — ENCOUNTER SYMPTOMS
DIARRHEA: 0
SORE THROAT: 0
ABDOMINAL PAIN: 0
EYE REDNESS: 0
CONSTIPATION: 0
WHEEZING: 0
COUGH: 0
BACK PAIN: 0
RHINORRHEA: 0
CHEST TIGHTNESS: 0
SHORTNESS OF BREATH: 0
BLOOD IN STOOL: 0
COLOR CHANGE: 0
VOMITING: 0
NAUSEA: 0
SINUS PRESSURE: 0
EYE ITCHING: 0

## 2025-02-25 ASSESSMENT — ANXIETY QUESTIONNAIRES
5. BEING SO RESTLESS THAT IT IS HARD TO SIT STILL: NOT AT ALL
2. NOT BEING ABLE TO STOP OR CONTROL WORRYING: NOT AT ALL
7. FEELING AFRAID AS IF SOMETHING AWFUL MIGHT HAPPEN: NOT AT ALL
1. FEELING NERVOUS, ANXIOUS, OR ON EDGE: NOT AT ALL
6. BECOMING EASILY ANNOYED OR IRRITABLE: NOT AT ALL
IF YOU CHECKED OFF ANY PROBLEMS ON THIS QUESTIONNAIRE, HOW DIFFICULT HAVE THESE PROBLEMS MADE IT FOR YOU TO DO YOUR WORK, TAKE CARE OF THINGS AT HOME, OR GET ALONG WITH OTHER PEOPLE: NOT DIFFICULT AT ALL
4. TROUBLE RELAXING: NOT AT ALL
3. WORRYING TOO MUCH ABOUT DIFFERENT THINGS: NOT AT ALL
GAD7 TOTAL SCORE: 0

## 2025-02-25 NOTE — ASSESSMENT & PLAN NOTE
Her hemoglobin A1c level is currently at 6.9, indicating a need for medication to manage her blood glucose levels. She reports symptoms including frequent urination, increased thirst, fatigue, unusual weight loss, irritability, blurry vision, slow-healing wounds, and numbness and tingling. A comprehensive discussion was held regarding the pathophysiology of diabetes, its potential complications, and various treatment options. She was advised to adopt a low-carbohydrate diet, monitor her blood glucose levels once or twice weekly, and maintain a record of her dietary intake. She was also encouraged to inform her ophthalmologist about her elevated blood glucose levels for additional retinal screening. A prescription for Farxiga 10 mg was provided. A glucose monitor will be sent to her pharmacy, and she can contact the office for instructions on its use.

## 2025-02-25 NOTE — ASSESSMENT & PLAN NOTE
Her LDL cholesterol level is elevated at 180, increasing her cardiovascular risk. She was advised to continue her atorvastatin regimen, with an increased dosage to 80 mg. This adjustment aims to lower her LDL levels and reduce the risk of heart attacks and strokes.

## 2025-02-25 NOTE — PROGRESS NOTES
Mecca Paniagua (:  1965) is a 60 y.o. female,Established patient, here for evaluation of the following chief complaint(s):  Discuss Labs (Pt here to discuss labs)      ASSESSMENT/PLAN:  1. Diabetes mellitus type 2 with complications (HCC)  Assessment & Plan:   Her hemoglobin A1c level is currently at 6.9, indicating a need for medication to manage her blood glucose levels. She reports symptoms including frequent urination, increased thirst, fatigue, unusual weight loss, irritability, blurry vision, slow-healing wounds, and numbness and tingling. A comprehensive discussion was held regarding the pathophysiology of diabetes, its potential complications, and various treatment options. She was advised to adopt a low-carbohydrate diet, monitor her blood glucose levels once or twice weekly, and maintain a record of her dietary intake. She was also encouraged to inform her ophthalmologist about her elevated blood glucose levels for additional retinal screening. A prescription for Farxiga 10 mg was provided. A glucose monitor will be sent to her pharmacy, and she can contact the office for instructions on its use.  2. Mixed hyperlipidemia  Assessment & Plan:   Her LDL cholesterol level is elevated at 180, increasing her cardiovascular risk. She was advised to continue her atorvastatin regimen, with an increased dosage to 80 mg. This adjustment aims to lower her LDL levels and reduce the risk of heart attacks and strokes.      Assessment & Plan  Follow-up  The patient is scheduled for a follow-up visit in 1 month.    No follow-ups on file.    SUBJECTIVE/OBJECTIVE:    History of Present Illness  The patient presents for evaluation of diabetes.    She has no prior history of diabetes but has been informed that her recent blood work indicates a transition from prediabetes to diabetes. She reports experiencing symptoms such as increased urination, excessive thirst, fatigue, unexplained weight loss, irritability, blurred

## 2025-02-28 ENCOUNTER — TELEPHONE (OUTPATIENT)
Dept: FAMILY MEDICINE CLINIC | Age: 60
End: 2025-02-28

## 2025-02-28 DIAGNOSIS — E11.8 DIABETES MELLITUS TYPE 2 WITH COMPLICATIONS (HCC): Primary | ICD-10-CM

## 2025-02-28 NOTE — TELEPHONE ENCOUNTER
Called pt she was seen on 2/25/2025 pt has a few questions  Is she able to take Seamoss?  2. The highest her BS reading was 177 after meals ( cucumber, water, egg salad, 2 slices of thin multi wheat bread, light lee)  3. Lowest BS reading was 55 during sleep  4.She feels woozy when BS is low, what can she do to feel better?  5. How long will it take for the meds to take effect and not get these high readings after meals?   6. Also she would like information for a dietician     She has been taking the Metformin BID since Wednesday and has not missed any doses.  Please advise

## 2025-02-28 NOTE — TELEPHONE ENCOUNTER
Pt just got diagnosed with diabetes and would like to receive a call back from clinical to answer some of her questions.   Thank You   [phone]

## 2025-03-03 NOTE — TELEPHONE ENCOUNTER
Of course than 1 metformin a day would be fine in regards to the questions from the other day as below her blood sugars look stable at the 177 and 55 hopefully with the metformin her numbers will come down when she is having low blood sugar and feels lightheaded or woozy she can have a fast acting carb to slightly raise her blood sugar.  You do not have the medications take anywhere from 3 to 6 months to start seeing information.  If she would like a dietitian I can send a referral to our nutritionist.

## 2025-03-03 NOTE — TELEPHONE ENCOUNTER
The Metformin she is taking is to strong. She cannot function. She want to know if leighann can take 1 pill aday of the Metformin. Her number is [phone] (Mobile)

## 2025-03-06 ENCOUNTER — OFFICE VISIT (OUTPATIENT)
Dept: FAMILY MEDICINE CLINIC | Age: 60
End: 2025-03-06
Payer: COMMERCIAL

## 2025-03-06 DIAGNOSIS — E11.8 DIABETES MELLITUS TYPE 2 WITH COMPLICATIONS (HCC): Primary | ICD-10-CM

## 2025-03-06 PROCEDURE — G2211 COMPLEX E/M VISIT ADD ON: HCPCS | Performed by: NURSE PRACTITIONER

## 2025-03-06 PROCEDURE — 99214 OFFICE O/P EST MOD 30 MIN: CPT | Performed by: NURSE PRACTITIONER

## 2025-03-06 PROCEDURE — 3044F HG A1C LEVEL LT 7.0%: CPT | Performed by: NURSE PRACTITIONER

## 2025-03-06 ASSESSMENT — ENCOUNTER SYMPTOMS
EYE REDNESS: 0
NAUSEA: 0
DIARRHEA: 0
BLOOD IN STOOL: 0
CHEST TIGHTNESS: 0
EYE ITCHING: 0
ABDOMINAL PAIN: 0
SORE THROAT: 0
COUGH: 0
SHORTNESS OF BREATH: 0
WHEEZING: 0
SINUS PRESSURE: 0
VOMITING: 0
COLOR CHANGE: 0
BACK PAIN: 0
CONSTIPATION: 0
RHINORRHEA: 0

## 2025-03-06 NOTE — ASSESSMENT & PLAN NOTE
Her elevated blood glucose levels are likely due to excessive carbohydrate intake, as evidenced by her breakfast consisting of gluten-free raisin bran, blueberries, and a banana. The consumption of these foods resulted in a significant spike in her blood glucose levels. She was advised to monitor her carbohydrate and sugar intake closely. She was educated on the importance of reading nutritional labels and understanding serving sizes. She was also encouraged to engage in physical activity, such as walking, to help manage her blood glucose levels. She was informed that while water can help, insulin is the only rapid method to lower high blood glucose levels, but it is not necessary for her at this time. A comprehensive discussion was held regarding the impact of various foods on her blood glucose levels. She was provided with educational materials on diabetes management, including information on planning healthy meals and reading nutritional labels. She was advised to consider alternatives to cow's milk, such as almond milk, and to focus on a lower carbohydrate diet. She was reassured that she could contact the office at any time if she had any concerns or questions. She was informed that Brandee, the dietitian, will reach out to her for further dietary education.    She was instructed on the correct usage of her insulin pen, including how to properly insert the needle and reset the device. She was shown how to engage the needle and was advised to use a setting of three on her device. She was also instructed on how to dispose of the used needles safely.

## 2025-03-06 NOTE — PROGRESS NOTES
Mecca Paniagua (:  1965) is a 60 y.o. female,Established patient, here for evaluation of the following chief complaint(s):  Diabetes (Dm instructions )      ASSESSMENT/PLAN:  1. Diabetes mellitus type 2 with complications (HCC)  Assessment & Plan:   Her elevated blood glucose levels are likely due to excessive carbohydrate intake, as evidenced by her breakfast consisting of gluten-free raisin bran, blueberries, and a banana. The consumption of these foods resulted in a significant spike in her blood glucose levels. She was advised to monitor her carbohydrate and sugar intake closely. She was educated on the importance of reading nutritional labels and understanding serving sizes. She was also encouraged to engage in physical activity, such as walking, to help manage her blood glucose levels. She was informed that while water can help, insulin is the only rapid method to lower high blood glucose levels, but it is not necessary for her at this time. A comprehensive discussion was held regarding the impact of various foods on her blood glucose levels. She was provided with educational materials on diabetes management, including information on planning healthy meals and reading nutritional labels. She was advised to consider alternatives to cow's milk, such as almond milk, and to focus on a lower carbohydrate diet. She was reassured that she could contact the office at any time if she had any concerns or questions. She was informed that Brandee, the dietitian, will reach out to her for further dietary education.    She was instructed on the correct usage of her insulin pen, including how to properly insert the needle and reset the device. She was shown how to engage the needle and was advised to use a setting of three on her device. She was also instructed on how to dispose of the used needles safely.      Assessment & Plan      No follow-ups on file.    SUBJECTIVE/OBJECTIVE:    History of Present Illness  The

## 2025-03-21 PROBLEM — Z00.00 ENCOUNTER FOR WELL ADULT EXAM WITHOUT ABNORMAL FINDINGS: Status: RESOLVED | Noted: 2023-04-03 | Resolved: 2025-03-21

## 2025-03-25 ENCOUNTER — OFFICE VISIT (OUTPATIENT)
Dept: FAMILY MEDICINE CLINIC | Age: 60
End: 2025-03-25
Payer: COMMERCIAL

## 2025-03-25 VITALS
BODY MASS INDEX: 28.66 KG/M2 | WEIGHT: 182.6 LBS | DIASTOLIC BLOOD PRESSURE: 64 MMHG | OXYGEN SATURATION: 97 % | HEART RATE: 61 BPM | SYSTOLIC BLOOD PRESSURE: 118 MMHG | TEMPERATURE: 96.5 F | HEIGHT: 67 IN

## 2025-03-25 DIAGNOSIS — E11.8 DIABETES MELLITUS TYPE 2 WITH COMPLICATIONS (HCC): Primary | ICD-10-CM

## 2025-03-25 PROCEDURE — G2211 COMPLEX E/M VISIT ADD ON: HCPCS | Performed by: NURSE PRACTITIONER

## 2025-03-25 PROCEDURE — 3044F HG A1C LEVEL LT 7.0%: CPT | Performed by: NURSE PRACTITIONER

## 2025-03-25 PROCEDURE — 3078F DIAST BP <80 MM HG: CPT | Performed by: NURSE PRACTITIONER

## 2025-03-25 PROCEDURE — 99214 OFFICE O/P EST MOD 30 MIN: CPT | Performed by: NURSE PRACTITIONER

## 2025-03-25 PROCEDURE — 3074F SYST BP LT 130 MM HG: CPT | Performed by: NURSE PRACTITIONER

## 2025-03-25 RX ORDER — ERGOCALCIFEROL 1.25 MG/1
CAPSULE, LIQUID FILLED ORAL
Qty: 4 CAPSULE | Refills: 5 | Status: SHIPPED | OUTPATIENT
Start: 2025-03-25

## 2025-03-25 ASSESSMENT — ENCOUNTER SYMPTOMS
SHORTNESS OF BREATH: 0
COUGH: 0

## 2025-03-25 NOTE — ASSESSMENT & PLAN NOTE
Stable.   A1c level was 6.9 at the last check, indicating good control.  Patient has been feeling well and exercising more. Blood sugar is monitored twice a day, with readings around 77 fasting and 158 postprandial.  Reassured that a postprandial reading of 158 is not concerning. Advised that fasting blood sugar should be between 70 and 100, and postprandial levels should not exceed 200.  Continue current medication regimen of metformin 500 mg once daily and maintain regular blood sugar monitoring.  She will be following up with dietician in a couple weeks.   Follow up in 2 months.

## 2025-03-25 NOTE — PROGRESS NOTES
Mecca Paniagua (:  1965) is a 60 y.o. female,Established patient, here for evaluation of the following chief complaint(s):  Diabetes      ASSESSMENT/PLAN:  1. Diabetes mellitus type 2 with complications (HCC)  Assessment & Plan:  Stable.   A1c level was 6.9 at the last check, indicating good control.  Patient has been feeling well and exercising more. Blood sugar is monitored twice a day, with readings around 77 fasting and 158 postprandial.  Reassured that a postprandial reading of 158 is not concerning. Advised that fasting blood sugar should be between 70 and 100, and postprandial levels should not exceed 200.  Continue current medication regimen of metformin 500 mg once daily and maintain regular blood sugar monitoring.  She will be following up with dietician in a couple weeks.   Follow up in 2 months.     Assessment & Plan      No follow-ups on file.    SUBJECTIVE/OBJECTIVE:    History of Present Illness  The patient is a 60-year-old female who presents for a follow-up of diabetes.    Overall improvement in health status is reported, attributed to increased physical activity. She has developed an awareness of her blood glucose levels, recognizing when they are elevated or significantly reduced. Blood glucose levels are monitored twice daily, once in the morning and once before bedtime. Recent readings include 77 preprandial and 158 postprandial, which caused concern. A headache was experienced following the postprandial reading, explained as a potential side effect of the recent meal. Headaches are reported, believed to be associated with elevated blood glucose levels, and a mild headache is currently present. No new symptoms such as shortness of breath, chest pain, or palpitations are reported. During the last telehealth consultation, the metformin dosage was reduced from two tablets to one due to the potency of the medication.      Current Outpatient Medications   Medication Sig Dispense Refill

## 2025-03-27 ENCOUNTER — OFFICE VISIT (OUTPATIENT)
Dept: ENDOCRINOLOGY | Age: 60
End: 2025-03-27
Payer: COMMERCIAL

## 2025-03-27 DIAGNOSIS — E11.8 DIABETES MELLITUS TYPE 2 WITH COMPLICATIONS (HCC): Primary | ICD-10-CM

## 2025-03-27 PROCEDURE — 97802 MEDICAL NUTRITION INDIV IN: CPT

## 2025-03-27 PROCEDURE — 3044F HG A1C LEVEL LT 7.0%: CPT

## 2025-03-27 NOTE — PROGRESS NOTES
Medical Nutrition Therapy for Diabetes  [facility] End[facility]    Mecca Paniagua  March 27, 2025    Patient Care Team:  Carmel Haas APRN - CNP as PCP - General (Nurse Practitioner)  Carmel Haas APRN - CNP as PCP - Empaneled Provider    Reason for visit: 1. Diabetes mellitus type 2 with complications (HCC)     Initial     ASSESSMENT/PLAN:   NUTRITION DIAGNOSIS    #1 Problem: Altered Nutrition-Related Laboratory Values (NC-2.2)  Related to: Endocrine/Diabetes   As Evidenced by: Elevated Plasma glucose and/or HgbA1c levels         #2 Problem: Knowledge and Beliefs-NB-3.1                       Food and nutrition deficits    #3 Problem: Inconsistent Carbohydrate Intake (NI 5.8.4)  Related to: Varied meal timing / incorrect carbohydrate counting  As Evidenced by: fluctuation in blood glucose levels / food recall    NUTRITION INTERVENTION  Nutrition Prescription: 45 grams carbohydrate per meal with protein and non-starch vegetables  15 gram carbohydrate snacks     Diabetes Education/Counseling included:  Diabetes disease process:  Pathophysiology of Diabetes  Explained HgbA1c ranges, reviewed normal/at risk for diabetes/and diabetes diagnosis ranges.    Nutrition Management:  Carbohydrate control-ADA plate method for pairing carbohydrate and non-carbohydrate foods  Impacts of fiber, fats, and proteins on blood glucose trending   Benefit of eating protein with each meal/snack reviewed  Label reading  Activity/Exercise-Encouraged 30 minutes of physical activity daily     Explained small efforts can promote improved health results  Monitoring: Glucometer- tests morning fasting  mg/dL, after meals 1-2 hours-150's.   Medication Reviewed    Handouts Provided:  [facility] Diabetes and Education Handouts- Araceli Diabetes  Planning Healthy Meals- [employer]okassi  Plate Method- ADA  Sample Menu- [facility]    Interventions:  Control Carbohydrate Intake using Plate Guide  Increase intake of

## 2025-04-06 RX ORDER — ERGOCALCIFEROL 1.25 MG/1
CAPSULE, LIQUID FILLED ORAL
Qty: 4 CAPSULE | Refills: 5 | Status: SHIPPED | OUTPATIENT
Start: 2025-04-06

## 2025-05-12 ENCOUNTER — HOSPITAL ENCOUNTER (OUTPATIENT)
Dept: MAMMOGRAPHY | Age: 60
Discharge: HOME | End: 2025-05-12
Payer: COMMERCIAL

## 2025-05-12 ENCOUNTER — RESULTS FOLLOW-UP (OUTPATIENT)
Dept: FAMILY MEDICINE CLINIC | Age: 60
End: 2025-05-12

## 2025-05-12 VITALS — WEIGHT: 174 LBS | HEIGHT: 67 IN | BODY MASS INDEX: 27.31 KG/M2

## 2025-05-12 DIAGNOSIS — Z00.00 ROUTINE GENERAL MEDICAL EXAMINATION AT A HEALTH CARE FACILITY: ICD-10-CM

## 2025-05-12 PROCEDURE — 77067 SCR MAMMO BI INCL CAD: CPT

## 2025-05-19 ENCOUNTER — OFFICE VISIT (OUTPATIENT)
Dept: FAMILY MEDICINE CLINIC | Age: 60
End: 2025-05-19
Payer: COMMERCIAL

## 2025-05-19 ENCOUNTER — HOSPITAL ENCOUNTER (OUTPATIENT)
Dept: GENERAL RADIOLOGY | Age: 60
Discharge: HOME | End: 2025-05-19
Payer: COMMERCIAL

## 2025-05-19 ENCOUNTER — TELEPHONE (OUTPATIENT)
Dept: FAMILY MEDICINE CLINIC | Age: 60
End: 2025-05-19

## 2025-05-19 VITALS
HEART RATE: 98 BPM | SYSTOLIC BLOOD PRESSURE: 108 MMHG | RESPIRATION RATE: 16 BRPM | BODY MASS INDEX: 28.35 KG/M2 | DIASTOLIC BLOOD PRESSURE: 60 MMHG | WEIGHT: 181 LBS | TEMPERATURE: 96.8 F | OXYGEN SATURATION: 97 %

## 2025-05-19 DIAGNOSIS — M10.09 ACUTE IDIOPATHIC GOUT OF MULTIPLE SITES: Primary | ICD-10-CM

## 2025-05-19 DIAGNOSIS — I10 ESSENTIAL HYPERTENSION: ICD-10-CM

## 2025-05-19 DIAGNOSIS — E11.8 DIABETES MELLITUS TYPE 2 WITH COMPLICATIONS (HCC): ICD-10-CM

## 2025-05-19 DIAGNOSIS — I42.8 OTHER CARDIOMYOPATHY (HCC): ICD-10-CM

## 2025-05-19 DIAGNOSIS — M10.09 ACUTE IDIOPATHIC GOUT OF MULTIPLE SITES: ICD-10-CM

## 2025-05-19 PROCEDURE — 3074F SYST BP LT 130 MM HG: CPT | Performed by: NURSE PRACTITIONER

## 2025-05-19 PROCEDURE — 99214 OFFICE O/P EST MOD 30 MIN: CPT | Performed by: NURSE PRACTITIONER

## 2025-05-19 PROCEDURE — 96372 THER/PROPH/DIAG INJ SC/IM: CPT | Performed by: NURSE PRACTITIONER

## 2025-05-19 PROCEDURE — 3078F DIAST BP <80 MM HG: CPT | Performed by: NURSE PRACTITIONER

## 2025-05-19 PROCEDURE — 73562 X-RAY EXAM OF KNEE 3: CPT

## 2025-05-19 PROCEDURE — 3044F HG A1C LEVEL LT 7.0%: CPT | Performed by: NURSE PRACTITIONER

## 2025-05-19 RX ORDER — LOSARTAN POTASSIUM 50 MG/1
50 TABLET ORAL DAILY
Qty: 90 TABLET | Refills: 1 | Status: SHIPPED | OUTPATIENT
Start: 2025-05-19

## 2025-05-19 RX ORDER — METHYLPREDNISOLONE ACETATE 40 MG/ML
40 INJECTION, SUSPENSION INTRA-ARTICULAR; INTRALESIONAL; INTRAMUSCULAR; SOFT TISSUE ONCE
Status: COMPLETED | OUTPATIENT
Start: 2025-05-19 | End: 2025-05-19

## 2025-05-19 RX ORDER — METHYLPREDNISOLONE 4 MG/1
TABLET ORAL
Qty: 1 KIT | Refills: 0 | Status: SHIPPED | OUTPATIENT
Start: 2025-05-19

## 2025-05-19 RX ADMIN — METHYLPREDNISOLONE ACETATE 40 MG: 40 INJECTION, SUSPENSION INTRA-ARTICULAR; INTRALESIONAL; INTRAMUSCULAR; SOFT TISSUE at 09:34

## 2025-05-19 ASSESSMENT — PATIENT HEALTH QUESTIONNAIRE - PHQ9
SUM OF ALL RESPONSES TO PHQ QUESTIONS 1-9: 0
1. LITTLE INTEREST OR PLEASURE IN DOING THINGS: NOT AT ALL
2. FEELING DOWN, DEPRESSED OR HOPELESS: NOT AT ALL

## 2025-05-19 NOTE — PROGRESS NOTES
Take 1 tablet by mouth daily 90 tablet 0    Blood Glucose Monitoring Suppl (FREESTYLE FREEDOM LITE) w/Device KIT 1 kit by Does not apply route daily 1 kit 0    blood glucose test strips (FREESTYLE LITE) strip 1 each by In Vitro route daily Testing 1-2 times daily per e11.9 100 each 3    FreeStyle Lancets MISC 1 each by Does not apply route daily 100 each 3    metFORMIN (GLUCOPHAGE) 500 MG tablet Take 1 tablet by mouth 2 times daily (with meals) 180 tablet 1    atorvastatin (LIPITOR) 40 MG tablet TAKE 1 TABLET BY MOUTH DAILY 30 tablet 3    allopurinol (ZYLOPRIM) 300 MG tablet Take 1 tablet by mouth daily 90 tablet 0    albuterol sulfate HFA (VENTOLIN HFA) 108 (90 Base) MCG/ACT inhaler Inhale 2 puffs into the lungs 4 times daily as needed for Wheezing 3 each 1    cetirizine (ZYRTEC) 10 MG tablet Take 1 tablet by mouth daily 30 tablet 0    colchicine (COLCRYS) 0.6 MG tablet TAKE TWO TABLETS (1.2 MG) BY MOUTH AT THE FIRST SIGN OF A FLARE FOLLOWED BY ONE ADDITIONAL TABLET ONE HOUR LATER. THEN TAKE ONE TABLET BY MOUTH DAILY AS NEEDED UNTIL RESOLVED. MAX 3 TABLETS DAILY 30 tablet 0     No current facility-administered medications for this visit.     MEDICATION LIST REVIEWED AND UPDATED AT TIME OF VISIT       Review of Systems    Vitals:    05/19/25 0912   BP: 108/60   Pulse: 98   Resp: 16   Temp: 96.8 °F (36 °C)   TempSrc: Temporal   SpO2: 97%   Weight: 82.1 kg (181 lb)       Physical Exam  Constitutional: Alert and oriented x 3, no apparent distress  HEENT: PERRL, EOMI, moist mucus membranes, TM clear  Neck: Supple, no lymphadenopathy  Resp: CTA bilaterally, no wheezes or rhonchi  Cardio: RRR without MRG, skin warm   GI: Soft, nontender, nondistended, BS+  Extremities:see below  Neurological: Grossly intact.  Skin: Warm & dry    Physical Exam  Musculoskeletal: Right knee is red and hot, swelling noted    The patient (or guardian, if applicable) and other individuals in attendance with the patient were advised that

## 2025-05-19 NOTE — TELEPHONE ENCOUNTER
Mecca came in for an appointment today.   She has some concerns as the medications that were prescribed are expensive.     She wanted to clarify everything she was to be taking.   I informed her of the medications listed and the new one began today at her visit.     She is still wondering if there is anything cheaper for the medications that were prescribed?    Phone [phone]

## 2025-05-20 LAB
ANION GAP SERPL CALCULATED.3IONS-SCNC: 12 MMOL/L (ref 3–16)
BUN SERPL-MCNC: 25 MG/DL (ref 7–20)
CALCIUM SERPL-MCNC: 9.9 MG/DL (ref 8.3–10.6)
CHLORIDE SERPL-SCNC: 107 MMOL/L (ref 99–110)
CO2 SERPL-SCNC: 23 MMOL/L (ref 21–32)
CREAT SERPL-MCNC: 1.3 MG/DL (ref 0.6–1.1)
GFR SERPLBLD CREATININE-BSD FMLA CKD-EPI: 48 ML/MIN/{1.73_M2}
GLUCOSE SERPL-MCNC: 120 MG/DL (ref 70–99)
POTASSIUM SERPL-SCNC: 4.5 MMOL/L (ref 3.5–5.1)
SODIUM SERPL-SCNC: 142 MMOL/L (ref 136–145)
URATE SERPL-MCNC: 4.3 MG/DL (ref 2.6–6)

## 2025-05-27 ENCOUNTER — OFFICE VISIT (OUTPATIENT)
Dept: FAMILY MEDICINE CLINIC | Age: 60
End: 2025-05-27
Payer: COMMERCIAL

## 2025-05-27 VITALS
OXYGEN SATURATION: 100 % | WEIGHT: 186.2 LBS | SYSTOLIC BLOOD PRESSURE: 124 MMHG | HEART RATE: 93 BPM | TEMPERATURE: 96.8 F | BODY MASS INDEX: 29.16 KG/M2 | RESPIRATION RATE: 16 BRPM | DIASTOLIC BLOOD PRESSURE: 64 MMHG

## 2025-05-27 DIAGNOSIS — M10.472 ACUTE GOUT DUE TO OTHER SECONDARY CAUSE INVOLVING TOE OF LEFT FOOT: ICD-10-CM

## 2025-05-27 DIAGNOSIS — E78.2 MIXED HYPERLIPIDEMIA: ICD-10-CM

## 2025-05-27 DIAGNOSIS — I10 ESSENTIAL HYPERTENSION: ICD-10-CM

## 2025-05-27 DIAGNOSIS — E11.8 DIABETES MELLITUS TYPE 2 WITH COMPLICATIONS (HCC): ICD-10-CM

## 2025-05-27 DIAGNOSIS — E11.9 TYPE 2 DIABETES MELLITUS WITHOUT COMPLICATION, WITHOUT LONG-TERM CURRENT USE OF INSULIN (HCC): Primary | ICD-10-CM

## 2025-05-27 LAB — HBA1C MFR BLD: 6.3 %

## 2025-05-27 PROCEDURE — 3074F SYST BP LT 130 MM HG: CPT | Performed by: NURSE PRACTITIONER

## 2025-05-27 PROCEDURE — 83036 HEMOGLOBIN GLYCOSYLATED A1C: CPT | Performed by: NURSE PRACTITIONER

## 2025-05-27 PROCEDURE — 3044F HG A1C LEVEL LT 7.0%: CPT | Performed by: NURSE PRACTITIONER

## 2025-05-27 PROCEDURE — 3078F DIAST BP <80 MM HG: CPT | Performed by: NURSE PRACTITIONER

## 2025-05-27 PROCEDURE — 99214 OFFICE O/P EST MOD 30 MIN: CPT | Performed by: NURSE PRACTITIONER

## 2025-05-28 ASSESSMENT — ENCOUNTER SYMPTOMS
BACK PAIN: 0
CHEST TIGHTNESS: 0
CONSTIPATION: 0
VOMITING: 0
DIARRHEA: 0
SORE THROAT: 0
WHEEZING: 0
NAUSEA: 0
EYE ITCHING: 0
RHINORRHEA: 0
SINUS PRESSURE: 0
EYE REDNESS: 0
BLOOD IN STOOL: 0
ABDOMINAL PAIN: 0
COLOR CHANGE: 0
SHORTNESS OF BREATH: 0
COUGH: 0

## 2025-05-28 NOTE — PROGRESS NOTES
Mecca Paniagua (:  1965) is a 60 y.o. female,Established patient, here for evaluation of the following chief complaint(s):  Medication Check      ASSESSMENT/PLAN:  1. Type 2 diabetes mellitus without complication, without long-term current use of insulin (HCC)  -     POCT glycosylated hemoglobin (Hb A1C)  2. Diabetes mellitus type 2 with complications (HCC)  3. Mixed hyperlipidemia  4. Acute gout due to other secondary cause involving toe of left foot  5. Essential hypertension      Assessment & Plan  1. Gout.  - The gout has resolved following treatment with steroids.  - The use of hydrochlorothiazide was discontinued due to its potential to exacerbate gout.  - Blood pressure today was 124/64.  - She will continue her current regimen of losartan 50 mg, Lipitor 40 mg, Zyrtec, metformin, and vitamin D. The reintroduction of allopurinol will be deferred at this time.    2. Diabetes mellitus.  - Her elevated blood glucose levels are likely a side effect of the steroid treatment.  - Her A1c level was 6.9 in 2025 and has decreased to 6.3 today.  - She will discontinue Jardiance for a period of 4 weeks to observe if her blood glucose levels normalize post-steroid treatment.  - She is advised to monitor her blood glucose levels during this period. If her blood glucose levels remain elevated, the reintroduction of Jardiance or an alternative medication will be considered.    3. Hypertension.  - Her blood pressure is well-controlled with losartan 50 mg.  - She will continue this medication without the addition of hydrochlorothiazide to avoid potential gout flare-ups.  - Blood pressure today was 124/64.  - Continue losartan 50 mg.    4. Hyperlipidemia.  - She will continue her current dose of Lipitor (atorvastatin), either 40 mg or 80 mg, as previously prescribed.  - Continue atorvastatin.  - Follow-up in 4 weeks to reassess medication efficacy and blood glucose levels.  - The patient will follow up in 4

## 2025-07-07 RX ORDER — ATORVASTATIN CALCIUM 80 MG/1
80 TABLET, FILM COATED ORAL DAILY
Qty: 30 TABLET | Refills: 0 | Status: SHIPPED | OUTPATIENT
Start: 2025-07-07

## 2025-07-14 ENCOUNTER — OFFICE VISIT (OUTPATIENT)
Dept: FAMILY MEDICINE CLINIC | Age: 60
End: 2025-07-14
Payer: COMMERCIAL

## 2025-07-14 VITALS
HEART RATE: 85 BPM | BODY MASS INDEX: 27.6 KG/M2 | OXYGEN SATURATION: 99 % | RESPIRATION RATE: 16 BRPM | SYSTOLIC BLOOD PRESSURE: 130 MMHG | TEMPERATURE: 96.9 F | DIASTOLIC BLOOD PRESSURE: 84 MMHG | WEIGHT: 176.2 LBS

## 2025-07-14 DIAGNOSIS — E11.9 TYPE 2 DIABETES MELLITUS WITHOUT COMPLICATION, WITHOUT LONG-TERM CURRENT USE OF INSULIN (HCC): Primary | ICD-10-CM

## 2025-07-14 LAB — HBA1C MFR BLD: 5.9 %

## 2025-07-14 PROCEDURE — 83036 HEMOGLOBIN GLYCOSYLATED A1C: CPT | Performed by: NURSE PRACTITIONER

## 2025-07-14 PROCEDURE — 3044F HG A1C LEVEL LT 7.0%: CPT | Performed by: NURSE PRACTITIONER

## 2025-07-14 PROCEDURE — 3079F DIAST BP 80-89 MM HG: CPT | Performed by: NURSE PRACTITIONER

## 2025-07-14 PROCEDURE — 3075F SYST BP GE 130 - 139MM HG: CPT | Performed by: NURSE PRACTITIONER

## 2025-07-14 PROCEDURE — 99214 OFFICE O/P EST MOD 30 MIN: CPT | Performed by: NURSE PRACTITIONER

## 2025-07-14 RX ORDER — FUROSEMIDE 20 MG/1
20 TABLET ORAL DAILY PRN
Qty: 60 TABLET | Refills: 3 | Status: SHIPPED | OUTPATIENT
Start: 2025-07-14

## 2025-07-14 ASSESSMENT — ENCOUNTER SYMPTOMS
BLOOD IN STOOL: 0
SORE THROAT: 0
DIARRHEA: 0
WHEEZING: 0
ABDOMINAL PAIN: 0
BACK PAIN: 0
RHINORRHEA: 0
CONSTIPATION: 0
COLOR CHANGE: 0
CHEST TIGHTNESS: 0
COUGH: 0
EYE ITCHING: 0
SHORTNESS OF BREATH: 0
VOMITING: 0
EYE REDNESS: 0
SINUS PRESSURE: 0
NAUSEA: 0

## 2025-07-14 NOTE — PROGRESS NOTES
Mecca Paniagua (:  1965) is a 60 y.o. female,Established patient, here for evaluation of the following chief complaint(s):  1 Month Follow-Up      ASSESSMENT/PLAN:  1. Type 2 diabetes mellitus without complication, without long-term current use of insulin (HCC)  -     POCT glycosylated hemoglobin (Hb A1C)      Assessment & Plan  1. Diabetes mellitus.  - A1c level was recorded at 6.3 during the last visit.  - Reports fluctuating blood sugar levels.  - A fingerstick test will be conducted today to assess current blood sugar level. A1c today 5.9  - Results will guide any necessary adjustments to the medication regimen.    2. Hypertension.  - Blood pressure today is 130/84, and heart rate is 85.  - Current medication, losartan 50 mg, is effectively managing the condition.  - Continue with the current dosage of losartan 50 mg.    3. Hypercholesterolemia.  - Currently taking Lipitor 80 mg for cholesterol management.  - No changes to this medication are necessary at this time.    4. Left ankle swelling.  - Reports swelling in the left ankle, present for months and tender.  - Swelling is intermittent.  - Prescription for Lasix will be provided to manage the swelling as needed.    No follow-ups on file.    SUBJECTIVE/OBJECTIVE:    History of Present Illness  The patient presents for a follow-up visit.    He reports feeling well overall. He has been monitoring his blood sugar levels, which have been fluctuating. He is currently on metformin 500 mg, and Jardiance was discontinued. His last A1c was 6.3.    He is on losartan 50 mg for blood pressure management and Lipitor 80 mg for cholesterol management.    He experiences intermittent swelling in his left ankle, which he initially attributed to fluid retention. The swelling has been present for several months. He also reports tingling in his feet. There is no pain in his calf, but he notes tenderness upon waking up in the morning. He took gout medicine thinking he was

## 2025-08-05 DIAGNOSIS — E78.2 MIXED HYPERLIPIDEMIA: Primary | ICD-10-CM

## 2025-08-05 RX ORDER — ATORVASTATIN CALCIUM 80 MG/1
80 TABLET, FILM COATED ORAL DAILY
Qty: 90 TABLET | Refills: 0 | Status: SHIPPED | OUTPATIENT
Start: 2025-08-05

## (undated) DEVICE — COVER LT HNDL BLU PLAS

## (undated) DEVICE — TRAP SPEC RETRV CLR PLAS POLYP IN LN SUCT QUIK CTCH

## (undated) DEVICE — SOLUTION IRRIG 3000ML 0.9% SOD CHL USP UROMATIC PLAS CONT

## (undated) DEVICE — SPONGE GZ W4XL8IN COT WVN 12 PLY

## (undated) DEVICE — BANDAGE COMPR W6INXL4.5YD LTWT E EC SGL LAYERED CLP CLSR

## (undated) DEVICE — CANNULA SAMP CO2 AD GRN 7FT CO2 AND 7FT O2 TBNG UNIV CONN

## (undated) DEVICE — SUTURE VCRL SZ 3-0 L27IN ABSRB UD L26MM SH 1/2 CIR J416H

## (undated) DEVICE — TRAY PREP DRY W/ PREM GLV 2 APPL 6 SPNG 2 UNDPD 1 OVERWRAP

## (undated) DEVICE — CONTAINER,SPECIMEN,PNEU TUBE,3OZ,OR STRL: Brand: MEDLINE

## (undated) DEVICE — AMD ANTIMICROBIAL BANDAGE ROLL,6 PLY: Brand: KERLIX

## (undated) DEVICE — CLIP LIG L235CM RESOL 360 BX/20

## (undated) DEVICE — Z CONVERTED USE 2273232 BANDAGE COMPR W6INXL11YD E KNIT DBL SELF CLSR EZE-BAND

## (undated) DEVICE — BANDAGE COBAN 6 IN WND 6INX5YD FOAM

## (undated) DEVICE — SPONGE,LAP,18"X18",DLX,XR,ST,5/PK,40/PK: Brand: MEDLINE

## (undated) DEVICE — LOWER EXTREMITY: Brand: MEDLINE INDUSTRIES, INC.

## (undated) DEVICE — GLOVE SURG SZ 7 CRM LTX FREE POLYISOPRENE POLYMER BEAD ANTI

## (undated) DEVICE — SNARE COLD DIAMOND 10MM THIN

## (undated) DEVICE — TOWEL,STOP FLAG GOLD N-W: Brand: MEDLINE

## (undated) DEVICE — INTENDED FOR TISSUE SEPARATION, AND OTHER PROCEDURES THAT REQUIRE A SHARP SURGICAL BLADE TO PUNCTURE OR CUT.: Brand: BARD-PARKER ® CARBON RIB-BACK BLADES

## (undated) DEVICE — BANDAGE COBAN 4 IN COMPR W4INXL5YD FOAM COHESIVE QUIK STK SELF ADH SFT